# Patient Record
Sex: FEMALE | Race: WHITE | NOT HISPANIC OR LATINO
[De-identification: names, ages, dates, MRNs, and addresses within clinical notes are randomized per-mention and may not be internally consistent; named-entity substitution may affect disease eponyms.]

---

## 2017-03-17 PROBLEM — Z00.00 ENCOUNTER FOR PREVENTIVE HEALTH EXAMINATION: Status: ACTIVE | Noted: 2017-03-17

## 2017-03-30 ENCOUNTER — RESULT CHARGE (OUTPATIENT)
Age: 27
End: 2017-03-30

## 2017-03-30 ENCOUNTER — OUTPATIENT (OUTPATIENT)
Dept: OUTPATIENT SERVICES | Facility: HOSPITAL | Age: 27
LOS: 1 days | Discharge: HOME | End: 2017-03-30

## 2017-03-30 ENCOUNTER — APPOINTMENT (OUTPATIENT)
Dept: OBGYN | Facility: CLINIC | Age: 27
End: 2017-03-30

## 2017-03-30 VITALS — WEIGHT: 168 LBS | SYSTOLIC BLOOD PRESSURE: 110 MMHG | DIASTOLIC BLOOD PRESSURE: 60 MMHG

## 2017-03-30 LAB
HCG UR QL: NEGATIVE
QUALITY CONTROL: NORMAL

## 2017-06-27 DIAGNOSIS — O00.90 UNSPECIFIED ECTOPIC PREGNANCY WITHOUT INTRAUTERINE PREGNANCY: ICD-10-CM

## 2017-07-26 ENCOUNTER — OUTPATIENT (OUTPATIENT)
Dept: OUTPATIENT SERVICES | Facility: HOSPITAL | Age: 27
LOS: 1 days | Discharge: HOME | End: 2017-07-26

## 2017-07-26 DIAGNOSIS — O00.90 UNSPECIFIED ECTOPIC PREGNANCY WITHOUT INTRAUTERINE PREGNANCY: ICD-10-CM

## 2017-08-01 DIAGNOSIS — B82.9 INTESTINAL PARASITISM, UNSPECIFIED: ICD-10-CM

## 2017-08-25 ENCOUNTER — APPOINTMENT (OUTPATIENT)
Dept: ANTEPARTUM | Facility: CLINIC | Age: 27
End: 2017-08-25

## 2017-08-25 ENCOUNTER — OUTPATIENT (OUTPATIENT)
Dept: OUTPATIENT SERVICES | Facility: HOSPITAL | Age: 27
LOS: 1 days | Discharge: HOME | End: 2017-08-25

## 2017-08-25 VITALS — HEART RATE: 82 BPM | OXYGEN SATURATION: 97 % | TEMPERATURE: 97.1 F

## 2017-08-25 VITALS
HEIGHT: 61 IN | WEIGHT: 174.38 LBS | SYSTOLIC BLOOD PRESSURE: 128 MMHG | DIASTOLIC BLOOD PRESSURE: 85 MMHG | BODY MASS INDEX: 32.92 KG/M2

## 2017-08-25 DIAGNOSIS — Z87.09 PERSONAL HISTORY OF OTHER DISEASES OF THE RESPIRATORY SYSTEM: ICD-10-CM

## 2017-08-25 DIAGNOSIS — Z87.891 PERSONAL HISTORY OF NICOTINE DEPENDENCE: ICD-10-CM

## 2017-08-25 DIAGNOSIS — Z82.49 FAMILY HISTORY OF ISCHEMIC HEART DISEASE AND OTHER DISEASES OF THE CIRCULATORY SYSTEM: ICD-10-CM

## 2017-08-25 DIAGNOSIS — O00.10 TUBAL PREGNANCY/ WITHOUT INTRAUTERINE PREGNANCY: ICD-10-CM

## 2017-08-25 DIAGNOSIS — Z80.0 FAMILY HISTORY OF MALIGNANT NEOPLASM OF DIGESTIVE ORGANS: ICD-10-CM

## 2017-08-25 DIAGNOSIS — Z01.419 ENCOUNTER FOR GYNECOLOGICAL EXAMINATION (GENERAL) (ROUTINE) W/OUT ABNORMAL FINDINGS: ICD-10-CM

## 2017-08-25 DIAGNOSIS — O09.899 SUPERVISION OF OTHER HIGH RISK PREGNANCIES, UNSPECIFIED TRIMESTER: ICD-10-CM

## 2017-08-25 DIAGNOSIS — Z86.69 PERSONAL HISTORY OF OTHER DISEASES OF THE NERVOUS SYSTEM AND SENSE ORGANS: ICD-10-CM

## 2017-08-25 DIAGNOSIS — O00.90 UNSPECIFIED ECTOPIC PREGNANCY WITHOUT INTRAUTERINE PREGNANCY: ICD-10-CM

## 2017-09-01 ENCOUNTER — OUTPATIENT (OUTPATIENT)
Dept: OUTPATIENT SERVICES | Facility: HOSPITAL | Age: 27
LOS: 1 days | Discharge: HOME | End: 2017-09-01

## 2017-09-01 ENCOUNTER — APPOINTMENT (OUTPATIENT)
Dept: ANTEPARTUM | Facility: CLINIC | Age: 27
End: 2017-09-01

## 2017-09-01 DIAGNOSIS — O00.90 UNSPECIFIED ECTOPIC PREGNANCY WITHOUT INTRAUTERINE PREGNANCY: ICD-10-CM

## 2017-09-15 ENCOUNTER — APPOINTMENT (OUTPATIENT)
Dept: ANTEPARTUM | Facility: CLINIC | Age: 27
End: 2017-09-15

## 2017-09-15 ENCOUNTER — OUTPATIENT (OUTPATIENT)
Dept: OUTPATIENT SERVICES | Facility: HOSPITAL | Age: 27
LOS: 1 days | Discharge: HOME | End: 2017-09-15

## 2017-09-15 DIAGNOSIS — O00.90 UNSPECIFIED ECTOPIC PREGNANCY WITHOUT INTRAUTERINE PREGNANCY: ICD-10-CM

## 2017-10-13 ENCOUNTER — OUTPATIENT (OUTPATIENT)
Dept: OUTPATIENT SERVICES | Facility: HOSPITAL | Age: 27
LOS: 1 days | Discharge: HOME | End: 2017-10-13

## 2017-10-13 ENCOUNTER — APPOINTMENT (OUTPATIENT)
Dept: ANTEPARTUM | Facility: CLINIC | Age: 27
End: 2017-10-13

## 2017-10-13 DIAGNOSIS — O00.90 UNSPECIFIED ECTOPIC PREGNANCY WITHOUT INTRAUTERINE PREGNANCY: ICD-10-CM

## 2017-11-02 ENCOUNTER — INPATIENT (INPATIENT)
Facility: HOSPITAL | Age: 27
LOS: 0 days | Discharge: LEFT BEFORE DISCHARGE | End: 2017-11-03
Attending: INTERNAL MEDICINE | Admitting: INTERNAL MEDICINE

## 2017-11-02 DIAGNOSIS — O00.90 UNSPECIFIED ECTOPIC PREGNANCY WITHOUT INTRAUTERINE PREGNANCY: ICD-10-CM

## 2017-11-06 DIAGNOSIS — R51 HEADACHE: ICD-10-CM

## 2017-11-06 DIAGNOSIS — O99.012 ANEMIA COMPLICATING PREGNANCY, SECOND TRIMESTER: ICD-10-CM

## 2017-11-06 DIAGNOSIS — G43.901 MIGRAINE, UNSPECIFIED, NOT INTRACTABLE, WITH STATUS MIGRAINOSUS: ICD-10-CM

## 2017-11-06 DIAGNOSIS — O09.12 SUPERVISION OF PREGNANCY WITH HISTORY OF ECTOPIC PREGNANCY, SECOND TRIMESTER: ICD-10-CM

## 2017-11-06 DIAGNOSIS — O99.352 DISEASES OF THE NERVOUS SYSTEM COMPLICATING PREGNANCY, SECOND TRIMESTER: ICD-10-CM

## 2017-11-06 DIAGNOSIS — R82.71 BACTERIURIA: ICD-10-CM

## 2017-11-06 DIAGNOSIS — D50.9 IRON DEFICIENCY ANEMIA, UNSPECIFIED: ICD-10-CM

## 2017-11-06 DIAGNOSIS — Z3A.14 14 WEEKS GESTATION OF PREGNANCY: ICD-10-CM

## 2017-12-08 ENCOUNTER — OUTPATIENT (OUTPATIENT)
Dept: OUTPATIENT SERVICES | Facility: HOSPITAL | Age: 27
LOS: 1 days | Discharge: HOME | End: 2017-12-08

## 2017-12-08 ENCOUNTER — APPOINTMENT (OUTPATIENT)
Dept: ANTEPARTUM | Facility: CLINIC | Age: 27
End: 2017-12-08

## 2017-12-08 DIAGNOSIS — O00.90 UNSPECIFIED ECTOPIC PREGNANCY WITHOUT INTRAUTERINE PREGNANCY: ICD-10-CM

## 2017-12-11 ENCOUNTER — OUTPATIENT (OUTPATIENT)
Dept: OUTPATIENT SERVICES | Facility: HOSPITAL | Age: 27
LOS: 1 days | Discharge: HOME | End: 2017-12-11

## 2017-12-11 ENCOUNTER — APPOINTMENT (OUTPATIENT)
Dept: ANTEPARTUM | Facility: CLINIC | Age: 27
End: 2017-12-11

## 2017-12-11 DIAGNOSIS — Z34.00 ENCOUNTER FOR SUPERVISION OF NORMAL FIRST PREGNANCY, UNSPECIFIED TRIMESTER: ICD-10-CM

## 2017-12-11 DIAGNOSIS — O00.90 UNSPECIFIED ECTOPIC PREGNANCY WITHOUT INTRAUTERINE PREGNANCY: ICD-10-CM

## 2017-12-23 ENCOUNTER — OUTPATIENT (OUTPATIENT)
Dept: OUTPATIENT SERVICES | Facility: HOSPITAL | Age: 27
LOS: 1 days | Discharge: HOME | End: 2017-12-23

## 2017-12-23 DIAGNOSIS — O00.90 UNSPECIFIED ECTOPIC PREGNANCY WITHOUT INTRAUTERINE PREGNANCY: ICD-10-CM

## 2017-12-29 DIAGNOSIS — O26.892 OTHER SPECIFIED PREGNANCY RELATED CONDITIONS, SECOND TRIMESTER: ICD-10-CM

## 2017-12-29 DIAGNOSIS — N89.8 OTHER SPECIFIED NONINFLAMMATORY DISORDERS OF VAGINA: ICD-10-CM

## 2018-01-05 ENCOUNTER — APPOINTMENT (OUTPATIENT)
Dept: ANTEPARTUM | Facility: CLINIC | Age: 28
End: 2018-01-05

## 2018-03-03 ENCOUNTER — OUTPATIENT (OUTPATIENT)
Dept: OUTPATIENT SERVICES | Facility: HOSPITAL | Age: 28
LOS: 1 days | Discharge: HOME | End: 2018-03-03

## 2018-03-03 VITALS
HEART RATE: 90 BPM | RESPIRATION RATE: 18 BRPM | SYSTOLIC BLOOD PRESSURE: 129 MMHG | TEMPERATURE: 98 F | DIASTOLIC BLOOD PRESSURE: 67 MMHG

## 2018-03-03 VITALS
DIASTOLIC BLOOD PRESSURE: 67 MMHG | HEART RATE: 90 BPM | SYSTOLIC BLOOD PRESSURE: 129 MMHG | RESPIRATION RATE: 18 BRPM | TEMPERATURE: 98 F

## 2018-03-03 DIAGNOSIS — O26.893 OTHER SPECIFIED PREGNANCY RELATED CONDITIONS, THIRD TRIMESTER: ICD-10-CM

## 2018-03-03 DIAGNOSIS — O47.03 FALSE LABOR BEFORE 37 COMPLETED WEEKS OF GESTATION, THIRD TRIMESTER: ICD-10-CM

## 2018-03-03 DIAGNOSIS — Z98.890 OTHER SPECIFIED POSTPROCEDURAL STATES: Chronic | ICD-10-CM

## 2018-03-03 NOTE — OB PROVIDER TRIAGE NOTE - NSHPPHYSICALEXAM_GEN_ALL_CORE
Vital Signs Last 24 Hrs  T(C): 36.9 (03 Mar 2018 05:02), Max: 36.9 (03 Mar 2018 05:00)  T(F): 98.4 (03 Mar 2018 05:02), Max: 98.42 (03 Mar 2018 05:00)  HR: 90 (03 Mar 2018 05:02) (90 - 90)  BP: 129/67 (03 Mar 2018 05:02) (129/67 - 129/67)  RR: 18 (03 Mar 2018 05:02) (18 - 18)    abd: soft, gravid, nontender, no palpable ctx, no CVA tenderness  EFM: 140/mod/accels+  toco: no ctx  SVE: c/l/p

## 2018-03-03 NOTE — OB PROVIDER TRIAGE NOTE - NSOBPROVIDERNOTE_OBGYN_ALL_OB_FT
26 y/o  at 31w2d GA, GBS unknown, fetus with tricuspid atresia, IUGR (EFW<3%ile), BPP 8/8, not in  labor.  - discharge home  - f/u with PMD and maternal fetal medicine at [facility]  - growth scan for EFW  - PO hydration encouraged  -  labor precautions given    Dr. Bright and Dr. Castillo aware.

## 2018-03-03 NOTE — OB PROVIDER TRIAGE NOTE - HISTORY OF PRESENT ILLNESS
28 y/o  at 31w2d by first trimester sonogram, presents for lower abdominal cramping since yesterday afternoon. Cramping is intermittent, 4/10 in intensity, radiating to lower back, lasts for few seconds. Denies vaginal bleeding and leakage of fluid. Reports good fetal movements. Denies fevers, n/v, chest pain, SOB, dysuria, diarrhea. Pregnancy complicated by fetal cardiac defect that was first observed on anatomy scan. Patient was followed at the high risk clinic but care was transferred to [facility]. Fetal echocardiogram at [place] showed probable tricuspid atresia per patient.  No other complications during this pregnancy. On last sonogram 1 week ago patient was IUGR (9 days behind per patient)    Ob Hx: G1 - SABx1 with no D&C, G2 - right ectopic pregnancy with lap right salpingectomy.   Gyn Hx: Denies cysts, fibroids, abnormal PAP smears and STDs.

## 2018-12-06 ENCOUNTER — APPOINTMENT (OUTPATIENT)
Dept: PLASTIC SURGERY | Facility: CLINIC | Age: 28
End: 2018-12-06

## 2018-12-11 ENCOUNTER — APPOINTMENT (OUTPATIENT)
Dept: BREAST CENTER | Facility: CLINIC | Age: 28
End: 2018-12-11

## 2019-07-16 ENCOUNTER — APPOINTMENT (OUTPATIENT)
Dept: OBGYN | Facility: CLINIC | Age: 29
End: 2019-07-16

## 2019-07-16 ENCOUNTER — RECORD ABSTRACTING (OUTPATIENT)
Age: 29
End: 2019-07-16

## 2019-07-16 DIAGNOSIS — Z78.9 OTHER SPECIFIED HEALTH STATUS: ICD-10-CM

## 2019-07-16 DIAGNOSIS — Z87.42 PERSONAL HISTORY OF OTHER DISEASES OF THE FEMALE GENITAL TRACT: ICD-10-CM

## 2019-07-16 DIAGNOSIS — Z86.19 PERSONAL HISTORY OF OTHER INFECTIOUS AND PARASITIC DISEASES: ICD-10-CM

## 2019-07-16 DIAGNOSIS — Z80.0 FAMILY HISTORY OF MALIGNANT NEOPLASM OF DIGESTIVE ORGANS: ICD-10-CM

## 2019-07-16 LAB
CYTOLOGY CVX/VAG DOC THIN PREP: NORMAL
CYTOLOGY CVX/VAG DOC THIN PREP: NORMAL

## 2020-02-06 ENCOUNTER — APPOINTMENT (OUTPATIENT)
Dept: NEUROLOGY | Facility: CLINIC | Age: 30
End: 2020-02-06
Payer: MEDICAID

## 2020-02-06 DIAGNOSIS — R51 HEADACHE: ICD-10-CM

## 2020-02-06 PROCEDURE — 99203 OFFICE O/P NEW LOW 30 MIN: CPT

## 2020-02-06 NOTE — PHYSICAL EXAM
[FreeTextEntry1] : Neurological Exam: \par Mental status: Awake, alert and oriented x3.  Recent and remote memory intact.  Naming, repetition and comprehension intact.  Attention/concentration intact.  No dysarthria, no aphasia.  Fund of knowledge appropriate.  \par Cranial nerves: Pupils equally round and reactive to light, visual fields full, no nystagmus, extraocular muscles intact, V1 through V3 intact bilaterally and symmetric, face symmetric, hearing intact to finger rub, palate elevation symmetric, tongue was midline.\par Motor:  MRC grading 5/5 b/l UE/LE.   strength 5/5.  Normal tone and bulk.  No abnormal movements.  \par Sensation: Intact to light touch, proprioception, and pinprick. \par Coordination: No dysmetria on finger-to-nose and heel-to-shin.  No dysdiadokinesia.\par Reflexes: 2+ in bilateral UE/LE, downgoing toes bilaterally. (-) Meza.\par Gait: Narrow and steady. No ataxia.  Romberg negative\par \par

## 2020-02-06 NOTE — HISTORY OF PRESENT ILLNESS
[FreeTextEntry1] : patient is a 30 yo female with hx of PCOS who presents for chonic migraines. patient has had migraines since age 13.  they are getting worse in that they are more intense and more frequent.\par she saw dr garcia 10 yr ago and she has been on imitrex prn since.  it has worked in the past but now she needs it too frequently\par has 15-20 ha days a month. patient has never been on a preventative in the past\par HA are described as bilateral usually, pounding , burning and sharp.  10/10 in intensity. has eye pain as well. +n/v\par +photo/phonophobia \par hot showers help her. \par they have become more and more debilitating

## 2020-02-06 NOTE — ASSESSMENT
[FreeTextEntry1] : patient is a 30 yo female with PCOS and chronic migraines who presents for worsening migraines\par MRI brain nc\par will start topamax 25 mg qHS\par cont imitrex 100 mg PRN\par Mg 400 mg qd and vit B2 100 Q 12\par f/u 4 mos

## 2020-03-02 ENCOUNTER — APPOINTMENT (OUTPATIENT)
Dept: OBGYN | Facility: CLINIC | Age: 30
End: 2020-03-02

## 2020-04-28 DIAGNOSIS — Z86.19 PERSONAL HISTORY OF OTHER INFECTIOUS AND PARASITIC DISEASES: ICD-10-CM

## 2020-07-20 ENCOUNTER — APPOINTMENT (OUTPATIENT)
Dept: OBGYN | Facility: CLINIC | Age: 30
End: 2020-07-20
Payer: MEDICAID

## 2020-09-17 ENCOUNTER — APPOINTMENT (OUTPATIENT)
Dept: OBGYN | Facility: CLINIC | Age: 30
End: 2020-09-17
Payer: MEDICAID

## 2020-09-17 VITALS
DIASTOLIC BLOOD PRESSURE: 86 MMHG | WEIGHT: 197 LBS | TEMPERATURE: 97.8 F | SYSTOLIC BLOOD PRESSURE: 130 MMHG | BODY MASS INDEX: 37.22 KG/M2

## 2020-09-17 DIAGNOSIS — E28.2 POLYCYSTIC OVARIAN SYNDROME: ICD-10-CM

## 2020-09-17 DIAGNOSIS — N92.6 IRREGULAR MENSTRUATION, UNSPECIFIED: ICD-10-CM

## 2020-09-17 DIAGNOSIS — Z01.411 ENCOUNTER FOR GYNECOLOGICAL EXAMINATION (GENERAL) (ROUTINE) WITH ABNORMAL FINDINGS: ICD-10-CM

## 2020-09-17 PROCEDURE — 99395 PREV VISIT EST AGE 18-39: CPT

## 2020-09-17 PROCEDURE — 99213 OFFICE O/P EST LOW 20 MIN: CPT | Mod: 25

## 2020-09-17 PROCEDURE — 81025 URINE PREGNANCY TEST: CPT

## 2020-12-23 PROBLEM — Z86.19 HISTORY OF CANDIDIASIS OF VAGINA: Status: RESOLVED | Noted: 2020-04-28 | Resolved: 2020-12-23

## 2021-01-28 ENCOUNTER — APPOINTMENT (OUTPATIENT)
Dept: OBGYN | Facility: CLINIC | Age: 31
End: 2021-01-28
Payer: MEDICAID

## 2021-01-28 DIAGNOSIS — R31.29 OTHER MICROSCOPIC HEMATURIA: ICD-10-CM

## 2021-01-28 DIAGNOSIS — R82.998 OTHER ABNORMAL FINDINGS IN URINE: ICD-10-CM

## 2021-01-28 DIAGNOSIS — Z11.3 ENCOUNTER FOR SCREENING FOR INFECTIONS WITH A PREDOMINANTLY SEXUAL MODE OF TRANSMISSION: ICD-10-CM

## 2021-01-28 DIAGNOSIS — N39.0 URINARY TRACT INFECTION, SITE NOT SPECIFIED: ICD-10-CM

## 2021-01-28 DIAGNOSIS — B37.3 CANDIDIASIS OF VULVA AND VAGINA: ICD-10-CM

## 2021-01-28 PROCEDURE — 99072 ADDL SUPL MATRL&STAF TM PHE: CPT

## 2021-01-28 PROCEDURE — 99214 OFFICE O/P EST MOD 30 MIN: CPT

## 2021-01-28 NOTE — PHYSICAL EXAM
[Alert] : alert [Appropriately responsive] : appropriately responsive [No Acute Distress] : no acute distress [No Lymphadenopathy] : no lymphadenopathy [Regular Rate Rhythm] : regular rate rhythm [No Murmurs] : no murmurs [Clear to Auscultation B/L] : clear to auscultation bilaterally [Soft] : soft [Non-tender] : non-tender [Non-distended] : non-distended [No HSM] : No HSM [No Lesions] : no lesions [No Mass] : no mass [Oriented x3] : oriented x3 [Labia Majora] : normal [Labia Minora] : normal [Tenderness] : tenderness [Discharge] : a  ~M vaginal discharge was present [Normal] : normal [Uterine Adnexae] : normal

## 2021-01-28 NOTE — REASON FOR VISIT
[Annual] : an annual visit. [Urinary Complaints] : urinary complaints [Vulvar/Vaginal Complaint] : vulvar/vaginal complaint

## 2021-01-28 NOTE — HISTORY OF PRESENT ILLNESS
[FreeTextEntry1] : Patient is 30 years old para 0-1-2-1 last menstrual period August 15, 2020\par She has a history of irregular menses and polycystic ovary syndrome\par Patient is scheduled to have a an appointment with a medical endocrinologist

## 2021-01-28 NOTE — DISCUSSION/SUMMARY
[FreeTextEntry1] : B VV, gonorrhea and Chlamydia test done\par Prescribed terconazole 7 and Lotrisone cream\par Prescribe Cipro 500 mg twice daily x7 days\par Labs for other STDs

## 2021-01-28 NOTE — REASON FOR VISIT
[Follow-Up] : a follow-up evaluation of [Urinary Complaints] : urinary complaints [Vulvar/Vaginal Complaint] : vulvar/vaginal complaint

## 2021-01-28 NOTE — HISTORY OF PRESENT ILLNESS
[FreeTextEntry1] : Patient is 30 years old para 0-1-2-1 last menstrual period January 3, 2021\par She complains of urinary symptoms including frequency and feeling of incomplete emptying of the bladder\par Urine dip notes moderate leukocytes and microscopic hematuria\par She also complains of vaginal discharge with vulvar irritation and inflammation\par She requests STD testing

## 2021-02-02 DIAGNOSIS — B96.89 ACUTE VAGINITIS: ICD-10-CM

## 2021-02-02 DIAGNOSIS — N76.0 ACUTE VAGINITIS: ICD-10-CM

## 2021-04-15 ENCOUNTER — APPOINTMENT (OUTPATIENT)
Dept: PLASTIC SURGERY | Facility: CLINIC | Age: 31
End: 2021-04-15
Payer: MEDICAID

## 2021-04-15 VITALS — BODY MASS INDEX: 34.93 KG/M2 | WEIGHT: 185 LBS | HEIGHT: 61 IN

## 2021-04-15 PROCEDURE — 99203 OFFICE O/P NEW LOW 30 MIN: CPT

## 2021-04-15 PROCEDURE — 99072 ADDL SUPL MATRL&STAF TM PHE: CPT

## 2021-04-15 NOTE — PHYSICAL EXAM
[NI] : Normal [de-identified] : macromastia   grade 3 ptosis  large pale areola   slightly diminished nipple sensation B/L  no masses  left side appears larger

## 2021-04-15 NOTE — ASSESSMENT
[FreeTextEntry1] : 30 yr old woman here for evaluationm for breast reduction\par \par going through separation\par has three yr old daughter\par \par nonsmoker\par nondiabetic\par no personal h/o breast dz\par no family h/o breast dz\par \par 5'1"  185 lbs\par \par last bra approx G cup size\par desires full D bra\par \par sees therapist twice a week\par \par Patient is a good candidate for breast reduction.  Regarding the procedure, we discussed scarring, poor wound healing, keloid and/or hypertrophic scarring, diminished nipple sensation, diminished ability to breast feed (if appropriate), breast asymmetry, dissatisfaction with the outcome, need for additional surgery and possible nipple loss.  All questions were answered and all risks were understood.\par \par Due to COVID 19, pre-visit patient instructions were explained to the patient and their symptoms were checked upon arrival.  \par Masks were used by the health care providers and staff and the examination room was cleaned after the patient visit was completed.\par \par Photos taken with patient permission.\par

## 2021-05-11 ENCOUNTER — NON-APPOINTMENT (OUTPATIENT)
Age: 31
End: 2021-05-11

## 2021-05-13 ENCOUNTER — APPOINTMENT (OUTPATIENT)
Dept: PLASTIC SURGERY | Facility: CLINIC | Age: 31
End: 2021-05-13
Payer: MEDICAID

## 2021-05-13 PROCEDURE — 99212 OFFICE O/P EST SF 10 MIN: CPT

## 2021-05-13 PROCEDURE — 99072 ADDL SUPL MATRL&STAF TM PHE: CPT

## 2021-05-13 NOTE — PHYSICAL EXAM
[NI] : Normal [de-identified] : macromastia   grade 3 ptosis  large pale areola   slightly diminished nipple sensation B/L  no masses  left side appears larger

## 2021-05-13 NOTE — ASSESSMENT
[FreeTextEntry1] : 30 yr old woman here for evaluationm for breast reduction\par \par going through separation\par has three yr old daughter\par \par nonsmoker\par nondiabetic\par no personal h/o breast dz\par no family h/o breast dz\par \par 5'1"  185 lbs\par \par last bra approx G cup size\par desires full D bra\par \par sees therapist twice a week\par \par Patient is a good candidate for breast reduction.  Regarding the procedure, we discussed scarring, poor wound healing, keloid and/or hypertrophic scarring, diminished nipple sensation, diminished ability to breast feed (if appropriate), breast asymmetry, dissatisfaction with the outcome, need for additional surgery and possible nipple loss.  All questions were answered and all risks were understood.\par \par Due to COVID 19, pre-visit patient instructions were explained to the patient and their symptoms were checked upon arrival.  \par Masks were used by the health care providers and staff and the examination room was cleaned after the patient visit was completed.\par \par Photos taken with patient permission.\par \par 5/13/2021\par here in f/u to discuss breast reduction\par \par pt states on much better terms w the father of her child (not back together but getting along much better)\par \par Still needs clearance from her psychologist--she has not been able to get a hold of her\par \par explained in detail importance of getting this clearance\par \par return when she has been able to get this clearance\par \par pt lives w three yr old daughter\par after surgery pt's mother will be able to help her as well as the daughter's father\par \par pt states she understands the approach

## 2021-10-10 LAB — HCG UR QL: NEGATIVE

## 2021-10-12 ENCOUNTER — EMERGENCY (EMERGENCY)
Facility: HOSPITAL | Age: 31
LOS: 0 days | Discharge: HOME | End: 2021-10-13
Attending: EMERGENCY MEDICINE | Admitting: EMERGENCY MEDICINE
Payer: MEDICAID

## 2021-10-12 VITALS
RESPIRATION RATE: 18 BRPM | DIASTOLIC BLOOD PRESSURE: 70 MMHG | HEART RATE: 82 BPM | SYSTOLIC BLOOD PRESSURE: 128 MMHG | OXYGEN SATURATION: 99 % | WEIGHT: 190.04 LBS | TEMPERATURE: 97 F

## 2021-10-12 DIAGNOSIS — Z98.890 OTHER SPECIFIED POSTPROCEDURAL STATES: Chronic | ICD-10-CM

## 2021-10-12 DIAGNOSIS — R11.0 NAUSEA: ICD-10-CM

## 2021-10-12 DIAGNOSIS — R10.11 RIGHT UPPER QUADRANT PAIN: ICD-10-CM

## 2021-10-12 LAB
ALBUMIN SERPL ELPH-MCNC: 4 G/DL — SIGNIFICANT CHANGE UP (ref 3.5–5.2)
ALP SERPL-CCNC: 122 U/L — HIGH (ref 30–115)
ALT FLD-CCNC: 24 U/L — SIGNIFICANT CHANGE UP (ref 0–41)
ANION GAP SERPL CALC-SCNC: 11 MMOL/L — SIGNIFICANT CHANGE UP (ref 7–14)
AST SERPL-CCNC: 18 U/L — SIGNIFICANT CHANGE UP (ref 0–41)
BASOPHILS # BLD AUTO: 0.07 K/UL — SIGNIFICANT CHANGE UP (ref 0–0.2)
BASOPHILS NFR BLD AUTO: 0.5 % — SIGNIFICANT CHANGE UP (ref 0–1)
BILIRUB SERPL-MCNC: <0.2 MG/DL — SIGNIFICANT CHANGE UP (ref 0.2–1.2)
BUN SERPL-MCNC: 14 MG/DL — SIGNIFICANT CHANGE UP (ref 10–20)
CALCIUM SERPL-MCNC: 9.7 MG/DL — SIGNIFICANT CHANGE UP (ref 8.5–10.1)
CHLORIDE SERPL-SCNC: 102 MMOL/L — SIGNIFICANT CHANGE UP (ref 98–110)
CO2 SERPL-SCNC: 24 MMOL/L — SIGNIFICANT CHANGE UP (ref 17–32)
CREAT SERPL-MCNC: 0.7 MG/DL — SIGNIFICANT CHANGE UP (ref 0.7–1.5)
EOSINOPHIL # BLD AUTO: 0.41 K/UL — SIGNIFICANT CHANGE UP (ref 0–0.7)
EOSINOPHIL NFR BLD AUTO: 3.2 % — SIGNIFICANT CHANGE UP (ref 0–8)
GLUCOSE SERPL-MCNC: 106 MG/DL — HIGH (ref 70–99)
HCT VFR BLD CALC: 38.6 % — SIGNIFICANT CHANGE UP (ref 37–47)
HGB BLD-MCNC: 12.7 G/DL — SIGNIFICANT CHANGE UP (ref 12–16)
IMM GRANULOCYTES NFR BLD AUTO: 0.3 % — SIGNIFICANT CHANGE UP (ref 0.1–0.3)
LIDOCAIN IGE QN: 27 U/L — SIGNIFICANT CHANGE UP (ref 7–60)
LYMPHOCYTES # BLD AUTO: 2.92 K/UL — SIGNIFICANT CHANGE UP (ref 1.2–3.4)
LYMPHOCYTES # BLD AUTO: 22.7 % — SIGNIFICANT CHANGE UP (ref 20.5–51.1)
MCHC RBC-ENTMCNC: 26.3 PG — LOW (ref 27–31)
MCHC RBC-ENTMCNC: 32.9 G/DL — SIGNIFICANT CHANGE UP (ref 32–37)
MCV RBC AUTO: 79.9 FL — LOW (ref 81–99)
MONOCYTES # BLD AUTO: 0.72 K/UL — HIGH (ref 0.1–0.6)
MONOCYTES NFR BLD AUTO: 5.6 % — SIGNIFICANT CHANGE UP (ref 1.7–9.3)
NEUTROPHILS # BLD AUTO: 8.71 K/UL — HIGH (ref 1.4–6.5)
NEUTROPHILS NFR BLD AUTO: 67.7 % — SIGNIFICANT CHANGE UP (ref 42.2–75.2)
NRBC # BLD: 0 /100 WBCS — SIGNIFICANT CHANGE UP (ref 0–0)
PLATELET # BLD AUTO: 456 K/UL — HIGH (ref 130–400)
POTASSIUM SERPL-MCNC: 4 MMOL/L — SIGNIFICANT CHANGE UP (ref 3.5–5)
POTASSIUM SERPL-SCNC: 4 MMOL/L — SIGNIFICANT CHANGE UP (ref 3.5–5)
PROT SERPL-MCNC: 6.8 G/DL — SIGNIFICANT CHANGE UP (ref 6–8)
RBC # BLD: 4.83 M/UL — SIGNIFICANT CHANGE UP (ref 4.2–5.4)
RBC # FLD: 13.3 % — SIGNIFICANT CHANGE UP (ref 11.5–14.5)
SODIUM SERPL-SCNC: 137 MMOL/L — SIGNIFICANT CHANGE UP (ref 135–146)
WBC # BLD: 12.87 K/UL — HIGH (ref 4.8–10.8)
WBC # FLD AUTO: 12.87 K/UL — HIGH (ref 4.8–10.8)

## 2021-10-12 PROCEDURE — 99285 EMERGENCY DEPT VISIT HI MDM: CPT

## 2021-10-12 RX ORDER — ONDANSETRON 8 MG/1
4 TABLET, FILM COATED ORAL ONCE
Refills: 0 | Status: COMPLETED | OUTPATIENT
Start: 2021-10-12 | End: 2021-10-13

## 2021-10-12 RX ORDER — MORPHINE SULFATE 50 MG/1
4 CAPSULE, EXTENDED RELEASE ORAL ONCE
Refills: 0 | Status: DISCONTINUED | OUTPATIENT
Start: 2021-10-12 | End: 2021-10-12

## 2021-10-12 RX ORDER — SODIUM CHLORIDE 9 MG/ML
1000 INJECTION INTRAMUSCULAR; INTRAVENOUS; SUBCUTANEOUS ONCE
Refills: 0 | Status: COMPLETED | OUTPATIENT
Start: 2021-10-12 | End: 2021-10-12

## 2021-10-13 VITALS
HEART RATE: 79 BPM | DIASTOLIC BLOOD PRESSURE: 66 MMHG | OXYGEN SATURATION: 97 % | RESPIRATION RATE: 18 BRPM | TEMPERATURE: 97 F | SYSTOLIC BLOOD PRESSURE: 113 MMHG

## 2021-10-13 LAB
APPEARANCE UR: CLEAR — SIGNIFICANT CHANGE UP
BACTERIA # UR AUTO: ABNORMAL
BILIRUB UR-MCNC: NEGATIVE — SIGNIFICANT CHANGE UP
COLOR SPEC: YELLOW — SIGNIFICANT CHANGE UP
DIFF PNL FLD: ABNORMAL
EPI CELLS # UR: ABNORMAL /HPF
GLUCOSE UR QL: NEGATIVE MG/DL — SIGNIFICANT CHANGE UP
KETONES UR-MCNC: NEGATIVE — SIGNIFICANT CHANGE UP
LEUKOCYTE ESTERASE UR-ACNC: ABNORMAL
NITRITE UR-MCNC: NEGATIVE — SIGNIFICANT CHANGE UP
PH UR: 6.5 — SIGNIFICANT CHANGE UP (ref 5–8)
PROT UR-MCNC: 30 MG/DL
RBC CASTS # UR COMP ASSIST: ABNORMAL /HPF
SP GR SPEC: 1.02 — SIGNIFICANT CHANGE UP (ref 1.01–1.03)
UROBILINOGEN FLD QL: 0.2 MG/DL — SIGNIFICANT CHANGE UP
WBC UR QL: SIGNIFICANT CHANGE UP /HPF

## 2021-10-13 PROCEDURE — G1004: CPT

## 2021-10-13 PROCEDURE — 76705 ECHO EXAM OF ABDOMEN: CPT | Mod: 26

## 2021-10-13 PROCEDURE — 74177 CT ABD & PELVIS W/CONTRAST: CPT | Mod: 26,ME

## 2021-10-13 RX ORDER — KETOROLAC TROMETHAMINE 30 MG/ML
30 SYRINGE (ML) INJECTION ONCE
Refills: 0 | Status: DISCONTINUED | OUTPATIENT
Start: 2021-10-13 | End: 2021-10-13

## 2021-10-13 RX ORDER — OXYCODONE AND ACETAMINOPHEN 5; 325 MG/1; MG/1
1 TABLET ORAL
Qty: 5 | Refills: 0
Start: 2021-10-13

## 2021-10-13 RX ADMIN — SODIUM CHLORIDE 1000 MILLILITER(S): 9 INJECTION INTRAMUSCULAR; INTRAVENOUS; SUBCUTANEOUS at 00:25

## 2021-10-13 RX ADMIN — Medication 30 MILLIGRAM(S): at 01:38

## 2021-10-13 RX ADMIN — ONDANSETRON 4 MILLIGRAM(S): 8 TABLET, FILM COATED ORAL at 00:25

## 2021-10-13 RX ADMIN — MORPHINE SULFATE 4 MILLIGRAM(S): 50 CAPSULE, EXTENDED RELEASE ORAL at 00:25

## 2021-10-13 NOTE — ED PROVIDER NOTE - PROGRESS NOTE DETAILS
patient re-evaluated and improved we discussed the case with surgery service of dr. Cerda advised to discharge with outpatient follow up we discussed the indications to return patient agrees with the poc

## 2021-10-13 NOTE — ED PROVIDER NOTE - CLINICAL SUMMARY MEDICAL DECISION MAKING FREE TEXT BOX
Patient presents for evaluation of ruq abdominal pain that is worse after eating.  She denies any fevers or chills she is able to tolerate po food and fluid   on exam she had ttp of the ruq abdominal pain with no guarding no rebound patients history is consistent with gb pathology we obtained ruq which reveals thickened wall with edema noted.  ct negative lipase negative.  I have discussed the case with surgical service advised to discharge patient with outpatient management for further evaluation

## 2021-10-13 NOTE — ED PROVIDER NOTE - OBJECTIVE STATEMENT
Patient is a 30 yo f who presents for evaluation of abdominal pain located in the right upper quad onset intermittent over the past 1 week she denies any fevers or chills she denies any vomiting but notes nausea she denies any diarrhea she prior surgical history of ectopic pregnancy in the past but no other she denies any dysuria hesitancy or frequency

## 2021-10-13 NOTE — ED PROVIDER NOTE - CARE PROVIDER_API CALL
Colton Cerda)  Surgery  [address] [place] [address]  Phone: [phone]  Fax: [phone]  Follow Up Time:

## 2021-10-13 NOTE — ED PROVIDER NOTE - NSFOLLOWUPINSTRUCTIONS_ED_ALL_ED_FT
SEE YOUR DOCTOR IN THE NEXT 24-48 HOURS   RETURN FOR ANY FURTHER CONCERNS     Gallstones    Gallstones (cholelithiasis) is a form of gallbladder disease in which stones form in your gallbladder. The gallbladder is an organ that stores bile made in the liver, which helps digest fats. Gallstones begin as small bile crystals and slowly grow into stones. Gallstone pain occurs when the gallbladder spasms and a gallstone or sludge is blocking the duct. Pain can also occur when a stone passes out of the duct. Only take over-the-counter or prescription medicines for pain, discomfort, or fever as directed by your health care provider. Follow a low-fat diet until seen again by your health care provider.     SEEK IMMEDIATE MEDICAL CARE IF YOU HAVE ANY OF THE FOLLOWING SYMPTOMS: worsening pain, fever, persistent vomiting, yellowing of the skin or eyes, or altered mental status.     Please return to the emergency department immediately should you feel worse in any way or have any of the following symptoms:    •	especially increased or different pain  •	 fevers  •	persistent vomiting  •	shaking chills    Please return to the emergency department for a recheck in 12 hours so we can re-evaluate you and ensure that you are not developing a problem that would require surgery or hospitalization.      Please follow up with the Doctor listed within the time frame specified. Thank you for coming to the emergency department. We hope you are feeling improved and continue to get better. Have a nice day.

## 2021-10-13 NOTE — ED PROVIDER NOTE - PATIENT PORTAL LINK FT
You can access the FollowMyHealth Patient Portal offered by [facility] by registering at the following website: [email or web address]. By joining [employer]’s FollowMyHealth portal, you will also be able to view your health information using other applications (apps) compatible with our system.

## 2021-10-15 ENCOUNTER — APPOINTMENT (OUTPATIENT)
Dept: SURGERY | Facility: CLINIC | Age: 31
End: 2021-10-15
Payer: MEDICAID

## 2021-10-15 VITALS
BODY MASS INDEX: 34.93 KG/M2 | WEIGHT: 185 LBS | OXYGEN SATURATION: 98 % | TEMPERATURE: 97.1 F | HEIGHT: 61 IN | HEART RATE: 88 BPM

## 2021-10-15 VITALS — DIASTOLIC BLOOD PRESSURE: 78 MMHG | SYSTOLIC BLOOD PRESSURE: 110 MMHG

## 2021-10-15 PROCEDURE — 99203 OFFICE O/P NEW LOW 30 MIN: CPT

## 2021-10-15 RX ORDER — CIPROFLOXACIN HYDROCHLORIDE 500 MG/1
500 TABLET, FILM COATED ORAL
Qty: 14 | Refills: 0 | Status: COMPLETED | COMMUNITY
Start: 2021-01-28 | End: 2021-10-15

## 2021-10-15 RX ORDER — METRONIDAZOLE 500 MG/1
500 TABLET ORAL TWICE DAILY
Qty: 14 | Refills: 0 | Status: COMPLETED | COMMUNITY
Start: 2021-02-02 | End: 2021-10-15

## 2021-10-15 RX ORDER — CLOTRIMAZOLE AND BETAMETHASONE DIPROPIONATE 10; .5 MG/G; MG/G
1-0.05 CREAM TOPICAL TWICE DAILY
Qty: 1 | Refills: 0 | Status: COMPLETED | COMMUNITY
Start: 2021-01-28 | End: 2021-10-15

## 2021-10-18 NOTE — ASSESSMENT
[FreeTextEntry1] : This is a 32yo woman with a history of chronic biliary colic, possible chronic cholecystitis as based on ultrasound and chronicity of symptoms. Currently not acutely ill.

## 2021-10-18 NOTE — REVIEW OF SYSTEMS
[Abdominal Pain] : abdominal pain [Heartburn] : heartburn [As Noted in HPI] : as noted in HPI [Heart Rate Is Slow] : the heart rate was not slow [Heart Rate Is Fast] : the heart rate was not fast [Chest Pain] : no chest pain [Shortness Of Breath] : no shortness of breath [Wheezing] : no wheezing [Cough] : no cough [Vomiting] : no vomiting [Constipation] : no constipation [Diarrhea] : no diarrhea [Joint Swelling] : no joint swelling [Joint Stiffness] : no joint stiffness [Limb Pain] : no limb pain [Limb Swelling] : no limb swelling [Confused] : no confusion [Convulsions] : no convulsions [Dizziness] : no dizziness [Fainting] : no fainting [Limb Weakness] : no limb weakness [Suicidal] : not suicidal [Sleep Disturbances] : no sleep disturbances [Anxiety] : no anxiety [Depression] : no depression [Proptosis] : no proptosis [Easy Bleeding] : no tendency for easy bleeding [Easy Bruising] : no tendency for easy bruising [Negative] : ENT [FreeTextEntry7] : Nausea

## 2021-10-18 NOTE — REASON FOR VISIT
[Initial Evaluation] : an initial evaluation [FreeTextEntry1] : Pt here for abdominal pain here for consult from Er visit 10/13/21

## 2021-10-18 NOTE — HISTORY OF PRESENT ILLNESS
[de-identified] : This is a 30yo woman with a history of ectopic pregnancy and migraines who presents for evaluation of abdominal pain. The pt describes a longstanding history of "indigestioin" and "foods giving me trouble." She first noted this a few years ago; pain is almost always post-prandial, located in the epigastrium with radiation to the back. There are no alleviating or exacerbating factors. She denies nausea, emesis, fevers, or chills. The symptoms did worsen during pregnancy; she has a daughter who is a toddler and requires open heart surgery on 11/4. The pt denies recent jaundice, icterus, acholic stools. [de-identified] : The pt did present to the ED for evaluation in the setting of a painful episode on Tuesday of this week. At that time, labs showed no abnormal findings inclusive of CBC and LFTs. US showed cholelithasis with some GB wall thickening and edema. The pt improved while in ED and was discharged with followup today. At the time of er ED visit, the surgical team was not consulted. Since this episode, the pt has had no abdominal pain or complaints. Her complaints recur when she eats large meals, however.

## 2021-10-18 NOTE — PLAN
[FreeTextEntry1] : - I recommend laparoscopic cholecystectomy. We reviewed the risks of the procedure including but not limited to bleeding, infection, retained stones, bile duct injury / bile leak, hernia, need for further procedures and surgery. The pt wishes to proceed with surgery, but will need to advise on timing given her daughter will be having surgery on 11/4. In the interim, she will adhere to low fat diet and return to the ED should an acute exacerbation / flare of abdominal pain occur.

## 2021-10-18 NOTE — PHYSICAL EXAM
[JVD] : no jugular venous distention  [Normal Thyroid] : the thyroid was normal [Normal Breath Sounds] : Normal breath sounds [Normal Heart Sounds] : normal heart sounds [Abdominal Masses] : No abdominal masses [Abdomen Tenderness] : ~T ~M No abdominal tenderness [Tender] : nontender [Enlarged] : not enlarged [No Rash or Lesion] : No rash or lesion [Alert] : alert [Oriented to Person] : oriented to person [Oriented to Place] : oriented to place [Oriented to Time] : oriented to time [Calm] : calm [de-identified] : Well appearing woman in NAD; seated [de-identified] : NCAT [de-identified] : Trachea midline [de-identified] : JHONNYR [de-identified] : NA [de-identified] : soft; obese. Nontender to palpation. Neg Joy's sign. [de-identified] : NA [de-identified] : NA [de-identified] : FROM; extremities warm and well perfsed with intact pedal pulses. [de-identified] : Warm; dry [de-identified] : Normal mood and affect

## 2021-10-21 ENCOUNTER — NON-APPOINTMENT (OUTPATIENT)
Age: 31
End: 2021-10-21

## 2021-10-21 ENCOUNTER — APPOINTMENT (OUTPATIENT)
Dept: SURGERY | Facility: HOSPITAL | Age: 31
End: 2021-10-21

## 2021-10-29 ENCOUNTER — APPOINTMENT (OUTPATIENT)
Dept: SURGERY | Facility: CLINIC | Age: 31
End: 2021-10-29

## 2022-01-24 ENCOUNTER — APPOINTMENT (OUTPATIENT)
Dept: OBGYN | Facility: CLINIC | Age: 32
End: 2022-01-24

## 2022-03-22 ENCOUNTER — APPOINTMENT (OUTPATIENT)
Dept: OBGYN | Facility: CLINIC | Age: 32
End: 2022-03-22
Payer: MEDICAID

## 2022-03-22 VITALS
BODY MASS INDEX: 39.27 KG/M2 | WEIGHT: 208 LBS | SYSTOLIC BLOOD PRESSURE: 116 MMHG | HEIGHT: 61 IN | DIASTOLIC BLOOD PRESSURE: 72 MMHG

## 2022-03-22 PROCEDURE — 99395 PREV VISIT EST AGE 18-39: CPT

## 2022-03-22 NOTE — HISTORY OF PRESENT ILLNESS
[FreeTextEntry1] : Patient is 31 years old para 1-0-0-1 last menstrual period March 14, 2022.\par She notes regular menstrual periods every month.  Patient states that she has intermittent left lower quadrant pain.  Patient states that she is having difficulty losing weight.

## 2022-03-22 NOTE — DISCUSSION/SUMMARY
[FreeTextEntry1] : Pap done\par Self breast exam stressed\par Prescribed hormone profile\par Prescribed pelvic ultrasound\par Keep menstrual calendar\par Follow-up yearly or as needed

## 2022-03-25 DIAGNOSIS — B37.3 CANDIDIASIS OF VULVA AND VAGINA: ICD-10-CM

## 2022-04-23 ENCOUNTER — INPATIENT (INPATIENT)
Facility: HOSPITAL | Age: 32
LOS: 6 days | Discharge: HOME | End: 2022-04-30
Attending: PSYCHIATRY & NEUROLOGY | Admitting: PSYCHIATRY & NEUROLOGY
Payer: MEDICAID

## 2022-04-23 VITALS
HEART RATE: 90 BPM | OXYGEN SATURATION: 98 % | HEIGHT: 61 IN | DIASTOLIC BLOOD PRESSURE: 87 MMHG | SYSTOLIC BLOOD PRESSURE: 135 MMHG | WEIGHT: 212.75 LBS | RESPIRATION RATE: 23 BRPM

## 2022-04-23 DIAGNOSIS — Z83.2 FAMILY HISTORY OF DISEASES OF THE BLOOD AND BLOOD-FORMING ORGANS AND CERTAIN DISORDERS INVOLVING THE IMMUNE MECHANISM: ICD-10-CM

## 2022-04-23 DIAGNOSIS — Z82.69 FAMILY HISTORY OF OTHER DISEASES OF THE MUSCULOSKELETAL SYSTEM AND CONNECTIVE TISSUE: ICD-10-CM

## 2022-04-23 DIAGNOSIS — Z28.310 UNVACCINATED FOR COVID-19: ICD-10-CM

## 2022-04-23 DIAGNOSIS — R40.2413 GLASGOW COMA SCALE SCORE 13-15, AT HOSPITAL ADMISSION: ICD-10-CM

## 2022-04-23 DIAGNOSIS — G43.909 MIGRAINE, UNSPECIFIED, NOT INTRACTABLE, WITHOUT STATUS MIGRAINOSUS: ICD-10-CM

## 2022-04-23 DIAGNOSIS — G93.6 CEREBRAL EDEMA: ICD-10-CM

## 2022-04-23 DIAGNOSIS — E66.9 OBESITY, UNSPECIFIED: ICD-10-CM

## 2022-04-23 DIAGNOSIS — I61.8 OTHER NONTRAUMATIC INTRACEREBRAL HEMORRHAGE: ICD-10-CM

## 2022-04-23 DIAGNOSIS — Z82.49 FAMILY HISTORY OF ISCHEMIC HEART DISEASE AND OTHER DISEASES OF THE CIRCULATORY SYSTEM: ICD-10-CM

## 2022-04-23 DIAGNOSIS — E28.2 POLYCYSTIC OVARIAN SYNDROME: ICD-10-CM

## 2022-04-23 DIAGNOSIS — Z98.890 OTHER SPECIFIED POSTPROCEDURAL STATES: Chronic | ICD-10-CM

## 2022-04-24 LAB
ANION GAP SERPL CALC-SCNC: 11 MMOL/L — SIGNIFICANT CHANGE UP (ref 7–14)
APTT BLD: 30.4 SEC — SIGNIFICANT CHANGE UP (ref 27–39.2)
BLD GP AB SCN SERPL QL: SIGNIFICANT CHANGE UP
BUN SERPL-MCNC: 14 MG/DL — SIGNIFICANT CHANGE UP (ref 10–20)
CALCIUM SERPL-MCNC: 9.4 MG/DL — SIGNIFICANT CHANGE UP (ref 8.5–10.1)
CHLORIDE SERPL-SCNC: 101 MMOL/L — SIGNIFICANT CHANGE UP (ref 98–110)
CO2 SERPL-SCNC: 23 MMOL/L — SIGNIFICANT CHANGE UP (ref 17–32)
CREAT SERPL-MCNC: 0.7 MG/DL — SIGNIFICANT CHANGE UP (ref 0.7–1.5)
EGFR: 117 ML/MIN/1.73M2 — SIGNIFICANT CHANGE UP
ERYTHROCYTE [SEDIMENTATION RATE] IN BLOOD: 50 MM/HR — HIGH (ref 0–20)
GLUCOSE SERPL-MCNC: 97 MG/DL — SIGNIFICANT CHANGE UP (ref 70–99)
HCG SERPL-ACNC: <0.6 MIU/ML — SIGNIFICANT CHANGE UP
HCT VFR BLD CALC: 38.3 % — SIGNIFICANT CHANGE UP (ref 37–47)
HGB BLD-MCNC: 12.8 G/DL — SIGNIFICANT CHANGE UP (ref 12–16)
INR BLD: 1.11 RATIO — SIGNIFICANT CHANGE UP (ref 0.65–1.3)
MCHC RBC-ENTMCNC: 26.1 PG — LOW (ref 27–31)
MCHC RBC-ENTMCNC: 33.4 G/DL — SIGNIFICANT CHANGE UP (ref 32–37)
MCV RBC AUTO: 78.2 FL — LOW (ref 81–99)
NRBC # BLD: 0 /100 WBCS — SIGNIFICANT CHANGE UP (ref 0–0)
PHOSPHATE SERPL-MCNC: 4.4 MG/DL — SIGNIFICANT CHANGE UP (ref 2.1–4.9)
PLATELET # BLD AUTO: 432 K/UL — HIGH (ref 130–400)
POTASSIUM SERPL-MCNC: 3.7 MMOL/L — SIGNIFICANT CHANGE UP (ref 3.5–5)
POTASSIUM SERPL-SCNC: 3.7 MMOL/L — SIGNIFICANT CHANGE UP (ref 3.5–5)
PROTHROM AB SERPL-ACNC: 12.8 SEC — SIGNIFICANT CHANGE UP (ref 9.95–12.87)
RBC # BLD: 4.9 M/UL — SIGNIFICANT CHANGE UP (ref 4.2–5.4)
RBC # FLD: 14 % — SIGNIFICANT CHANGE UP (ref 11.5–14.5)
SODIUM SERPL-SCNC: 135 MMOL/L — SIGNIFICANT CHANGE UP (ref 135–146)
WBC # BLD: 12.46 K/UL — HIGH (ref 4.8–10.8)
WBC # FLD AUTO: 12.46 K/UL — HIGH (ref 4.8–10.8)

## 2022-04-24 PROCEDURE — 70450 CT HEAD/BRAIN W/O DYE: CPT | Mod: 26

## 2022-04-24 PROCEDURE — 99291 CRITICAL CARE FIRST HOUR: CPT | Mod: GC

## 2022-04-24 RX ORDER — SODIUM CHLORIDE 9 MG/ML
1000 INJECTION, SOLUTION INTRAVENOUS
Refills: 0 | Status: DISCONTINUED | OUTPATIENT
Start: 2022-04-24 | End: 2022-04-25

## 2022-04-24 RX ORDER — ONDANSETRON 8 MG/1
4 TABLET, FILM COATED ORAL EVERY 6 HOURS
Refills: 0 | Status: DISCONTINUED | OUTPATIENT
Start: 2022-04-24 | End: 2022-04-25

## 2022-04-24 RX ORDER — POTASSIUM CHLORIDE 20 MEQ
40 PACKET (EA) ORAL ONCE
Refills: 0 | Status: COMPLETED | OUTPATIENT
Start: 2022-04-24 | End: 2022-04-24

## 2022-04-24 RX ORDER — HEPARIN SODIUM 5000 [USP'U]/ML
5000 INJECTION INTRAVENOUS; SUBCUTANEOUS EVERY 8 HOURS
Refills: 0 | Status: DISCONTINUED | OUTPATIENT
Start: 2022-04-25 | End: 2022-04-30

## 2022-04-24 RX ORDER — ACETAMINOPHEN 500 MG
1000 TABLET ORAL EVERY 6 HOURS
Refills: 0 | Status: COMPLETED | OUTPATIENT
Start: 2022-04-24 | End: 2022-04-24

## 2022-04-24 RX ORDER — OXYCODONE HYDROCHLORIDE 5 MG/1
2.5 TABLET ORAL EVERY 4 HOURS
Refills: 0 | Status: DISCONTINUED | OUTPATIENT
Start: 2022-04-24 | End: 2022-04-24

## 2022-04-24 RX ORDER — ACETAMINOPHEN 500 MG
1000 TABLET ORAL ONCE
Refills: 0 | Status: DISCONTINUED | OUTPATIENT
Start: 2022-04-24 | End: 2022-04-24

## 2022-04-24 RX ORDER — ACETAMINOPHEN 500 MG
650 TABLET ORAL EVERY 4 HOURS
Refills: 0 | Status: DISCONTINUED | OUTPATIENT
Start: 2022-04-24 | End: 2022-04-24

## 2022-04-24 RX ORDER — ACETAMINOPHEN 500 MG
975 TABLET ORAL EVERY 6 HOURS
Refills: 0 | Status: COMPLETED | OUTPATIENT
Start: 2022-04-24 | End: 2022-04-25

## 2022-04-24 RX ORDER — HYDROMORPHONE HYDROCHLORIDE 2 MG/ML
0.25 INJECTION INTRAMUSCULAR; INTRAVENOUS; SUBCUTANEOUS EVERY 4 HOURS
Refills: 0 | Status: DISCONTINUED | OUTPATIENT
Start: 2022-04-24 | End: 2022-04-26

## 2022-04-24 RX ORDER — OXYCODONE HYDROCHLORIDE 5 MG/1
5 TABLET ORAL EVERY 6 HOURS
Refills: 0 | Status: DISCONTINUED | OUTPATIENT
Start: 2022-04-24 | End: 2022-04-30

## 2022-04-24 RX ORDER — ONDANSETRON 8 MG/1
4 TABLET, FILM COATED ORAL EVERY 6 HOURS
Refills: 0 | Status: DISCONTINUED | OUTPATIENT
Start: 2022-04-24 | End: 2022-04-24

## 2022-04-24 RX ORDER — MORPHINE SULFATE 50 MG/1
2 CAPSULE, EXTENDED RELEASE ORAL ONCE
Refills: 0 | Status: DISCONTINUED | OUTPATIENT
Start: 2022-04-24 | End: 2022-04-24

## 2022-04-24 RX ORDER — CHLORHEXIDINE GLUCONATE 213 G/1000ML
1 SOLUTION TOPICAL EVERY 12 HOURS
Refills: 0 | Status: DISCONTINUED | OUTPATIENT
Start: 2022-04-24 | End: 2022-04-30

## 2022-04-24 RX ADMIN — Medication 650 MILLIGRAM(S): at 01:24

## 2022-04-24 RX ADMIN — Medication 975 MILLIGRAM(S): at 11:28

## 2022-04-24 RX ADMIN — Medication 975 MILLIGRAM(S): at 23:38

## 2022-04-24 RX ADMIN — CHLORHEXIDINE GLUCONATE 1 APPLICATION(S): 213 SOLUTION TOPICAL at 06:44

## 2022-04-24 RX ADMIN — HYDROMORPHONE HYDROCHLORIDE 0.25 MILLIGRAM(S): 2 INJECTION INTRAMUSCULAR; INTRAVENOUS; SUBCUTANEOUS at 23:30

## 2022-04-24 RX ADMIN — HYDROMORPHONE HYDROCHLORIDE 0.25 MILLIGRAM(S): 2 INJECTION INTRAMUSCULAR; INTRAVENOUS; SUBCUTANEOUS at 23:15

## 2022-04-24 RX ADMIN — Medication 975 MILLIGRAM(S): at 11:58

## 2022-04-24 RX ADMIN — Medication 40 MILLIEQUIVALENT(S): at 11:28

## 2022-04-24 RX ADMIN — MORPHINE SULFATE 2 MILLIGRAM(S): 50 CAPSULE, EXTENDED RELEASE ORAL at 11:46

## 2022-04-24 RX ADMIN — HYDROMORPHONE HYDROCHLORIDE 0.25 MILLIGRAM(S): 2 INJECTION INTRAMUSCULAR; INTRAVENOUS; SUBCUTANEOUS at 17:59

## 2022-04-24 RX ADMIN — MORPHINE SULFATE 2 MILLIGRAM(S): 50 CAPSULE, EXTENDED RELEASE ORAL at 11:31

## 2022-04-24 RX ADMIN — Medication 1 DROP(S): at 06:47

## 2022-04-24 RX ADMIN — ONDANSETRON 4 MILLIGRAM(S): 8 TABLET, FILM COATED ORAL at 16:48

## 2022-04-24 RX ADMIN — Medication 400 MILLIGRAM(S): at 06:47

## 2022-04-24 RX ADMIN — ONDANSETRON 4 MILLIGRAM(S): 8 TABLET, FILM COATED ORAL at 09:14

## 2022-04-24 RX ADMIN — Medication 1000 MILLIGRAM(S): at 07:03

## 2022-04-24 RX ADMIN — Medication 650 MILLIGRAM(S): at 01:54

## 2022-04-24 RX ADMIN — HYDROMORPHONE HYDROCHLORIDE 0.25 MILLIGRAM(S): 2 INJECTION INTRAMUSCULAR; INTRAVENOUS; SUBCUTANEOUS at 19:00

## 2022-04-24 NOTE — H&P ADULT - HISTORY OF PRESENT ILLNESS
31F with a past medical history of migraines on Sumitriptan PRN, pre-eclampsia requiring induction/, presented to [facility] @ 4am on  complaining of headache and visual disturbances. States it was sharp, worse than her usual migraines. CTA head revealed 3.2 x 1.7 cm acute R occipital intraparenchymal hemorrhage. MR Brain w/wo contrast in afternoon with stable hematoma, ?venous angioma. She was started on Keppra. Normotensive upon arrival. Patient transferred to [facility] via EMS per family request. Upon arrival to NSICU, vital signs within normal limits, neuro exam with L visual field deficit, consistent with sign out per [facility], otherwise no focal deficits. ICH=0. She complains of headache and "unable to focus her eyesight" and intermittent dizziness. Patient denies fever, drug use, recent travel, numbness, tingling, weakness, chest pain, SOB, abdominal pain, N/V, diarrhea, dysuria.    Of note, patient has two sisters with Hemophilia A.    31F with a past medical history of migraines on Sumitriptan PRN, pre-eclampsia requiring induction/, presented to [facility] @ 4am on  complaining of headache and visual disturbances. States it was sharp, worse than her usual migraines. CTA head revealed 3.2 x 1.7 cm acute R occipital intraparenchymal hemorrhage. MR Brain w/wo contrast in afternoon with stable hematoma, ?venous angioma. She was started on Keppra. Normotensive upon arrival. Patient transferred to [facility] via EMS per family request. Upon arrival to NSICU, vital signs within normal limits, neuro exam with L visual field deficit, consistent with sign out per [facility], otherwise no focal deficits. ICH=0, GCS 15. She complains of headache and "unable to focus her eyesight" and intermittent dizziness. Patient denies fever, drug use, recent travel, numbness, tingling, weakness, chest pain, SOB, abdominal pain, N/V, diarrhea, dysuria.    Of note, patient has two sisters with Hemophilia A.

## 2022-04-24 NOTE — H&P ADULT - CRITICAL CARE ATTENDING COMMENT
31 f with L field cut/HA p/w R occipital IPH, transferred from [facility]    neuro checks as above  stable repeat CTH  neuroIR for DSA  analgesia  oobtc
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
## 2022-04-24 NOTE — H&P ADULT - NSHPPHYSICALEXAM_GEN_ALL_CORE
General: Laying in bed, comfortable  Neuro: AAOx4, following commands, L visual field deficit, Strength 5/5 in upper and lower extremities, no drift with antigravity   Cardio: +S1,S2 RRR  Lung: CTABL  Abd: Soft NDNT  Ext: no swelling, no edema General: Laying in bed, comfortable  Neuro: AAOx4, following commands, L visual field deficit, PERRL, EOMI, tongue midline, finger to nose intact, 5/5 strength in b/l UE's and LE's, sensation intact to touch in all extremities, no pronator drift  Lung: CTABL  Abd: Soft NDNT  Ext: no swelling, no edema

## 2022-04-24 NOTE — PHYSICAL THERAPY INITIAL EVALUATION ADULT - TINETTI GAIT TEST, REHAB EVAL
Clear bilaterally, pupils equal, round and reactive to light.
total score 9/28. Based on this test pt is a high risk to fall.

## 2022-04-24 NOTE — H&P ADULT - NSICDXFAMILYHX_GEN_ALL_CORE_FT
FAMILY HISTORY:  Mother  Still living? Yes, Estimated age: 61-70  Family history of antiphospholipid syndrome, Age at diagnosis: Age Unknown  Family history of systemic lupus erythematosus (SLE) in mother, Age at diagnosis: Age Unknown    Sibling  Still living? Yes, Estimated age: 31-40  Family history of hemophilia A, Age at diagnosis: Age Unknown

## 2022-04-24 NOTE — PHYSICAL THERAPY INITIAL EVALUATION ADULT - GENERAL OBSERVATIONS, REHAB EVAL
pt seen in ICU. 2406-3793 pm. 32 y/o F rec'd/left in bed, nad, + tele. A+Ox4, pleasant and ageeable to PT evaluation. presents with LT visual field cut, however not able to tolerate formal evaluation at this time. tolerated getting OOB and ambulated 6 ft before returning to bed due to c/o frontal HA and photophobia. RN made aware, will administer IV pain meds. vitals: 102/61 84 95% on RA.

## 2022-04-24 NOTE — PATIENT PROFILE ADULT - FALL HARM RISK - HARM RISK INTERVENTIONS
Communicate Risk of Fall with Harm to all staff/Monitor for mental status changes/Reinforce activity limits and safety measures with patient and family/Reorient to person, place and time as needed/Sit up slowly, dangle for a short time, stand at bedside before walking/Tailored Fall Risk Interventions/Visual Cue: Yellow wristband and red socks/Bed in lowest position, wheels locked, appropriate side rails in place/Call bell, personal items and telephone in reach/Instruct patient to call for assistance before getting out of bed or chair/Non-slip footwear when patient is out of bed/[place] to call system/Physically safe environment - no spills, clutter or unnecessary equipment/Purposeful Proactive Rounding/Room/bathroom lighting operational, light cord in reach

## 2022-04-24 NOTE — H&P ADULT - ASSESSMENT
N31F with pmhx of migraines transferred from OSH for mgmt of acute R occipital intraparenchymal hemorrhage. Physical exam with left visual field deficit. Vital signs within normal  limits. GCS 15, ICH 0.   NEURO:  # Acute R Occipital Hematoma  Repeat CTH in morning  Neuro endovasc for CTA  q1h neurochecks   tylenol PRN for pain  Activity: [] OOB as tolerated [X] Bedrest [] PT [] OT [] PMNR    PULM:  Maintain SpO2 >92%    CV:  SBP <140  TTE    RENAL:  Strict Is and Os    GI:  NPO, sips with meds  GI prophylaxis [X] not indicated [] PPI [] other:    ENDO:   Goal euglycemia (-180)    HEME/ONC:  VTE prophylaxis: [X] SCDs [] chemoprophylaxis [] hold chemoprophylaxis due to: [] high risk of DVT/PE on admission due to:  # Family Hx Hemophilia A  - Consider Heme C/s   - Send coags    ID:  WBC 14 at OSH  No indication for ABX at this time    MISC:    CODE STATUS:  [] Full Code [] DNR [] DNI [] Palliative/Comfort Care    DISPOSITION:  [X] ICU [] Stroke Unit [] Floor [] EMU [] RCU [] PCU    [] Patient is at high risk of neurologic deterioration/death due to:     Time seen:  Time spent: ___ [] critical care minutes    Contact: [phone] N31F with pmhx of migraines transferred from OSH for mgmt of acute R occipital intraparenchymal hemorrhage. Physical exam with left visual field deficit. Vital signs within normal  limits. GCS 15, ICH 0.   NEURO:  # Acute R Occipital Hematoma  Repeat CTH in morning  Angiogram when able  q1h neurochecks   tylenol PRN for pain  Activity: [] OOB as tolerated [X] Bedrest [] PT [] OT [] PMNR    PULM:  Maintain SpO2 >92%    CV:  SBP <140  TTE    RENAL:  Strict Is and Os    GI:  NPO, sips with meds  GI prophylaxis [X] not indicated [] PPI [] other:    ENDO:   Goal euglycemia (-180)    HEME/ONC:  VTE prophylaxis: [X] SCDs    # Family Hx Hemophilia A  - Consider Heme C/s   - Send coags    ID:  WBC 14 at OSH  No indication for ABX at this time    MISC:    CODE STATUS:  [] Full Code [] DNR [] DNI [] Palliative/Comfort Care    DISPOSITION:  [X] ICU [] Stroke Unit [] Floor [] EMU [] RCU [] PCU    [] Patient is at high risk of neurologic deterioration/death due to:     Time seen:  Time spent: ___ [] critical care minutes    Contact: [phone] 31F with pmhx of migraines transferred from OSH for mgmt of acute R occipital intraparenchymal hemorrhage. Physical exam with left visual field deficit. Vital signs within normal  limits. GCS 15, ICH 0.     NEURO:  # Acute R Occipital Hematoma  CTH - interval stability imaging this AM on 4/24 (compared to read from [facility] in AM on 4/23)- stable IPH  CTA/MR w/w/o at [facility] in chart- concern for possible venous anomaly   Neuro-IR consultation for DSA  q4h neurochecks   tylenol/sumatriptan  Activity: [x] OOB as tolerated [] Bedrest [x] PT [x] OT [] PMNR    PULM:  Maintain SpO2 >92%    CV:  SBP <140    RENAL:  IVL  voiding freely   goal normonatremia    GI:  full diet  anti-emetics  GI prophylaxis [X] not indicated [] PPI [] other:    ENDO:   serum glucose normal     HEME/ONC:  VTE prophylaxis: [X] SCDs  chemoprophylaxis pending stability imaging- hsq  Family Hx vWD and APL  coags wnl    ID:  likely stress induced leukocytosis  no sequelae of infection    MISC:    CODE STATUS:  [x] Full Code [] DNR [] DNI [] Palliative/Comfort Care    DISPOSITION:  [X] ICU [] Stroke Unit [] Floor [] EMU [] RCU [] PCU    [] Patient is at high risk of neurologic deterioration/death due to:  ICH    Time seen:  Time spent: __45_ [] critical care minutes     31F with pmhx of migraines transferred from OSH for mgmt of acute R occipital intraparenchymal hemorrhage. Physical exam with left visual field deficit. Vital signs within normal  limits. GCS 15, ICH 0.     NEURO:  # Acute R Occipital Hematoma  CTH - interval stability imaging this AM on 4/24 (compared to read from [facility] in AM on 4/23)- stable IPH  CTA/MR w/w/o at [facility] in chart- concern for possible venous anomaly   Neuro-IR consultation for DSA  q4h neurochecks   tylenol/sumatriptan  Activity: [x] OOB as tolerated [] Bedrest [x] PT [x] OT [] PMNR    PULM:  Maintain SpO2 >92%    CV:  SBP <140    RENAL:  IVL  voiding freely   goal normonatremia    GI:  full diet  anti-emetics  GI prophylaxis [X] not indicated [] PPI [] other:    ENDO:   serum glucose normal     HEME/ONC:  VTE prophylaxis: [X] SCDs  chemoprophylaxis pending stability imaging- hsq  Family Hx vWD and APL  coags wnl  f/u vwf activity, antigen,     ID:  likely stress induced leukocytosis  no sequelae of infection    MISC:    CODE STATUS:  [x] Full Code [] DNR [] DNI [] Palliative/Comfort Care    DISPOSITION:  [X] ICU [] Stroke Unit [] Floor [] EMU [] RCU [] PCU    [] Patient is at high risk of neurologic deterioration/death due to:  ICH    Time seen:  Time spent: __45_ [] critical care minutes

## 2022-04-25 LAB
ALBUMIN SERPL ELPH-MCNC: 4.1 G/DL — SIGNIFICANT CHANGE UP (ref 3.5–5.2)
ALP SERPL-CCNC: 108 U/L — SIGNIFICANT CHANGE UP (ref 30–115)
ALT FLD-CCNC: 31 U/L — SIGNIFICANT CHANGE UP (ref 0–41)
ANION GAP SERPL CALC-SCNC: 10 MMOL/L — SIGNIFICANT CHANGE UP (ref 7–14)
ANION GAP SERPL CALC-SCNC: 15 MMOL/L — HIGH (ref 7–14)
APTT BLD: 29.7 SEC — SIGNIFICANT CHANGE UP (ref 27–39.2)
AST SERPL-CCNC: 21 U/L — SIGNIFICANT CHANGE UP (ref 0–41)
BASOPHILS # BLD AUTO: 0.04 K/UL — SIGNIFICANT CHANGE UP (ref 0–0.2)
BASOPHILS NFR BLD AUTO: 0.2 % — SIGNIFICANT CHANGE UP (ref 0–1)
BILIRUB SERPL-MCNC: 0.4 MG/DL — SIGNIFICANT CHANGE UP (ref 0.2–1.2)
BLD GP AB SCN SERPL QL: SIGNIFICANT CHANGE UP
BUN SERPL-MCNC: 11 MG/DL — SIGNIFICANT CHANGE UP (ref 10–20)
BUN SERPL-MCNC: 12 MG/DL — SIGNIFICANT CHANGE UP (ref 10–20)
CALCIUM SERPL-MCNC: 8.7 MG/DL — SIGNIFICANT CHANGE UP (ref 8.5–10.1)
CALCIUM SERPL-MCNC: 9.1 MG/DL — SIGNIFICANT CHANGE UP (ref 8.5–10.1)
CHLORIDE SERPL-SCNC: 100 MMOL/L — SIGNIFICANT CHANGE UP (ref 98–110)
CHLORIDE SERPL-SCNC: 103 MMOL/L — SIGNIFICANT CHANGE UP (ref 98–110)
CO2 SERPL-SCNC: 21 MMOL/L — SIGNIFICANT CHANGE UP (ref 17–32)
CO2 SERPL-SCNC: 26 MMOL/L — SIGNIFICANT CHANGE UP (ref 17–32)
CREAT SERPL-MCNC: 0.6 MG/DL — LOW (ref 0.7–1.5)
CREAT SERPL-MCNC: 0.7 MG/DL — SIGNIFICANT CHANGE UP (ref 0.7–1.5)
CRP SERPL-MCNC: 17 MG/L — HIGH
EGFR: 117 ML/MIN/1.73M2 — SIGNIFICANT CHANGE UP
EGFR: 121 ML/MIN/1.73M2 — SIGNIFICANT CHANGE UP
EOSINOPHIL # BLD AUTO: 0.01 K/UL — SIGNIFICANT CHANGE UP (ref 0–0.7)
EOSINOPHIL NFR BLD AUTO: 0.1 % — SIGNIFICANT CHANGE UP (ref 0–8)
GLUCOSE SERPL-MCNC: 111 MG/DL — HIGH (ref 70–99)
GLUCOSE SERPL-MCNC: 137 MG/DL — HIGH (ref 70–99)
HCT VFR BLD CALC: 37.2 % — SIGNIFICANT CHANGE UP (ref 37–47)
HGB BLD-MCNC: 12.6 G/DL — SIGNIFICANT CHANGE UP (ref 12–16)
IMM GRANULOCYTES NFR BLD AUTO: 0.3 % — SIGNIFICANT CHANGE UP (ref 0.1–0.3)
INR BLD: 1.15 RATIO — SIGNIFICANT CHANGE UP (ref 0.65–1.3)
LYMPHOCYTES # BLD AUTO: 1.64 K/UL — SIGNIFICANT CHANGE UP (ref 1.2–3.4)
LYMPHOCYTES # BLD AUTO: 9.3 % — LOW (ref 20.5–51.1)
MAGNESIUM SERPL-MCNC: 1.9 MG/DL — SIGNIFICANT CHANGE UP (ref 1.8–2.4)
MAGNESIUM SERPL-MCNC: 2.1 MG/DL — SIGNIFICANT CHANGE UP (ref 1.8–2.4)
MCHC RBC-ENTMCNC: 26.5 PG — LOW (ref 27–31)
MCHC RBC-ENTMCNC: 33.9 G/DL — SIGNIFICANT CHANGE UP (ref 32–37)
MCV RBC AUTO: 78.2 FL — LOW (ref 81–99)
MONOCYTES # BLD AUTO: 0.84 K/UL — HIGH (ref 0.1–0.6)
MONOCYTES NFR BLD AUTO: 4.8 % — SIGNIFICANT CHANGE UP (ref 1.7–9.3)
NEUTROPHILS # BLD AUTO: 15.08 K/UL — HIGH (ref 1.4–6.5)
NEUTROPHILS NFR BLD AUTO: 85.3 % — HIGH (ref 42.2–75.2)
NRBC # BLD: 0 /100 WBCS — SIGNIFICANT CHANGE UP (ref 0–0)
PLATELET # BLD AUTO: 441 K/UL — HIGH (ref 130–400)
POTASSIUM SERPL-MCNC: 3.9 MMOL/L — SIGNIFICANT CHANGE UP (ref 3.5–5)
POTASSIUM SERPL-MCNC: 4.6 MMOL/L — SIGNIFICANT CHANGE UP (ref 3.5–5)
POTASSIUM SERPL-SCNC: 3.9 MMOL/L — SIGNIFICANT CHANGE UP (ref 3.5–5)
POTASSIUM SERPL-SCNC: 4.6 MMOL/L — SIGNIFICANT CHANGE UP (ref 3.5–5)
PROT SERPL-MCNC: 6.7 G/DL — SIGNIFICANT CHANGE UP (ref 6–8)
PROTHROM AB SERPL-ACNC: 13.2 SEC — HIGH (ref 9.95–12.87)
RBC # BLD: 4.76 M/UL — SIGNIFICANT CHANGE UP (ref 4.2–5.4)
RBC # FLD: 13.8 % — SIGNIFICANT CHANGE UP (ref 11.5–14.5)
SODIUM SERPL-SCNC: 136 MMOL/L — SIGNIFICANT CHANGE UP (ref 135–146)
SODIUM SERPL-SCNC: 139 MMOL/L — SIGNIFICANT CHANGE UP (ref 135–146)
WBC # BLD: 17.67 K/UL — HIGH (ref 4.8–10.8)
WBC # FLD AUTO: 17.67 K/UL — HIGH (ref 4.8–10.8)

## 2022-04-25 PROCEDURE — 99233 SBSQ HOSP IP/OBS HIGH 50: CPT

## 2022-04-25 PROCEDURE — 99291 CRITICAL CARE FIRST HOUR: CPT

## 2022-04-25 RX ORDER — SUMATRIPTAN SUCCINATE 4 MG/.5ML
100 INJECTION, SOLUTION SUBCUTANEOUS DAILY
Refills: 0 | Status: DISCONTINUED | OUTPATIENT
Start: 2022-04-25 | End: 2022-04-25

## 2022-04-25 RX ORDER — MAGNESIUM SULFATE 500 MG/ML
2 VIAL (ML) INJECTION ONCE
Refills: 0 | Status: COMPLETED | OUTPATIENT
Start: 2022-04-25 | End: 2022-04-25

## 2022-04-25 RX ORDER — METOCLOPRAMIDE HCL 10 MG
10 TABLET ORAL EVERY 6 HOURS
Refills: 0 | Status: DISCONTINUED | OUTPATIENT
Start: 2022-04-25 | End: 2022-04-27

## 2022-04-25 RX ADMIN — HEPARIN SODIUM 5000 UNIT(S): 5000 INJECTION INTRAVENOUS; SUBCUTANEOUS at 14:59

## 2022-04-25 RX ADMIN — SODIUM CHLORIDE 75 MILLILITER(S): 9 INJECTION, SOLUTION INTRAVENOUS at 00:00

## 2022-04-25 RX ADMIN — Medication 1 DROP(S): at 22:18

## 2022-04-25 RX ADMIN — SODIUM CHLORIDE 75 MILLILITER(S): 9 INJECTION, SOLUTION INTRAVENOUS at 10:35

## 2022-04-25 RX ADMIN — Medication 975 MILLIGRAM(S): at 00:08

## 2022-04-25 RX ADMIN — HYDROMORPHONE HYDROCHLORIDE 0.25 MILLIGRAM(S): 2 INJECTION INTRAMUSCULAR; INTRAVENOUS; SUBCUTANEOUS at 15:10

## 2022-04-25 RX ADMIN — HEPARIN SODIUM 5000 UNIT(S): 5000 INJECTION INTRAVENOUS; SUBCUTANEOUS at 22:17

## 2022-04-25 RX ADMIN — OXYCODONE HYDROCHLORIDE 5 MILLIGRAM(S): 5 TABLET ORAL at 16:39

## 2022-04-25 RX ADMIN — HYDROMORPHONE HYDROCHLORIDE 0.25 MILLIGRAM(S): 2 INJECTION INTRAMUSCULAR; INTRAVENOUS; SUBCUTANEOUS at 05:32

## 2022-04-25 RX ADMIN — HYDROMORPHONE HYDROCHLORIDE 0.25 MILLIGRAM(S): 2 INJECTION INTRAMUSCULAR; INTRAVENOUS; SUBCUTANEOUS at 10:35

## 2022-04-25 RX ADMIN — HYDROMORPHONE HYDROCHLORIDE 0.25 MILLIGRAM(S): 2 INJECTION INTRAMUSCULAR; INTRAVENOUS; SUBCUTANEOUS at 16:00

## 2022-04-25 RX ADMIN — Medication 10 MILLIGRAM(S): at 10:30

## 2022-04-25 RX ADMIN — HYDROMORPHONE HYDROCHLORIDE 0.25 MILLIGRAM(S): 2 INJECTION INTRAMUSCULAR; INTRAVENOUS; SUBCUTANEOUS at 22:32

## 2022-04-25 RX ADMIN — HYDROMORPHONE HYDROCHLORIDE 0.25 MILLIGRAM(S): 2 INJECTION INTRAMUSCULAR; INTRAVENOUS; SUBCUTANEOUS at 10:51

## 2022-04-25 RX ADMIN — Medication 10 MILLIGRAM(S): at 16:38

## 2022-04-25 RX ADMIN — Medication 975 MILLIGRAM(S): at 05:32

## 2022-04-25 RX ADMIN — OXYCODONE HYDROCHLORIDE 5 MILLIGRAM(S): 5 TABLET ORAL at 08:19

## 2022-04-25 RX ADMIN — HYDROMORPHONE HYDROCHLORIDE 0.25 MILLIGRAM(S): 2 INJECTION INTRAMUSCULAR; INTRAVENOUS; SUBCUTANEOUS at 05:47

## 2022-04-25 RX ADMIN — Medication 25 GRAM(S): at 08:20

## 2022-04-25 RX ADMIN — OXYCODONE HYDROCHLORIDE 5 MILLIGRAM(S): 5 TABLET ORAL at 09:00

## 2022-04-25 RX ADMIN — CHLORHEXIDINE GLUCONATE 1 APPLICATION(S): 213 SOLUTION TOPICAL at 05:33

## 2022-04-25 RX ADMIN — HYDROMORPHONE HYDROCHLORIDE 0.25 MILLIGRAM(S): 2 INJECTION INTRAMUSCULAR; INTRAVENOUS; SUBCUTANEOUS at 22:17

## 2022-04-25 RX ADMIN — OXYCODONE HYDROCHLORIDE 5 MILLIGRAM(S): 5 TABLET ORAL at 17:16

## 2022-04-25 RX ADMIN — Medication 1 DROP(S): at 04:13

## 2022-04-25 RX ADMIN — ONDANSETRON 4 MILLIGRAM(S): 8 TABLET, FILM COATED ORAL at 08:19

## 2022-04-25 RX ADMIN — Medication 975 MILLIGRAM(S): at 06:02

## 2022-04-25 NOTE — PROGRESS NOTE ADULT - ASSESSMENT
31F with pmhx of migraines transferred from OSH for mgmt of acute R occipital intraparenchymal hemorrhage. Physical exam with left visual field deficit. Vital signs within normal  limits. GCS 15, ICH 0.     NEURO:  # Acute R Occipital Hematoma  CTH - interval stability imaging this AM on 4/24 (compared to read from [facility] in AM on 4/23)- stable IPH  CTA/MR w/w/o at [facility] in chart- concern for possible venous anomaly   Neuro-IR consultation for DSA  q4h neurochecks   tylenol/sumatriptan  Activity: [x] OOB as tolerated [] Bedrest [x] PT [x] OT [] PMNR    PULM:  Maintain SpO2 >92%    CV:  SBP <140    RENAL:  IVL  voiding freely   goal normonatremia    GI:  full diet  anti-emetics  GI prophylaxis [X] not indicated [] PPI [] other:    ENDO:   serum glucose normal     HEME/ONC:  VTE prophylaxis: [X] SCDs  chemoprophylaxis pending stability imaging- hsq  Family Hx vWD and APL  coags wnl  f/u vwf activity, antigen,     ID:  likely stress induced leukocytosis  no sequelae of infection    MISC:    CODE STATUS:  [x] Full Code [] DNR [] DNI [] Palliative/Comfort Care    DISPOSITION:  [X] ICU [] Stroke Unit [] Floor [] EMU [] RCU [] PCU 31F with pmhx of migraines transferred from OSH for mgmt of acute R occipital intraparenchymal hemorrhage. Physical exam with left visual field deficit. Vital signs within normal  limits. GCS 15, ICH 0.     NEURO:  # Acute R Occipital Hematoma  CTH - interval stability imaging on 4/24 (compared to read from [facility] in AM on 4/23)- stable IPH  CTA/MR w/w/o at [facility] in chart- concern for possible venous anomaly   Neuro-IR consultation for DSA  q4h neurochecks   tylenol/oxycodone/dilaudid PRN  Activity: [x] OOB as tolerated [] Bedrest [x] PT [x] OT [] PMNR    PULM:  Maintain SpO2 >92%    CV:  SBP <140    RENAL:  Normosol while NPO  voiding freely   goal normonatremia    GI:  NPO for possible DSA today  anti-emetics  GI prophylaxis [X] not indicated [] PPI [] other:    ENDO:   serum glucose normal     HEME/ONC:  VTE prophylaxis: SCDs, SQH  Family Hx vWD and APL  coags wnl  f/u vwf activity, antigen,     ID:  likely stress induced leukocytosis  no sequelae of infection    MISC:    CODE STATUS:  [x] Full Code [] DNR [] DNI [] Palliative/Comfort Care    DISPOSITION:  [X] ICU [] Stroke Unit [] Floor [] CEU 31F with pmhx of migraines transferred from OSH for mgmt of acute R occipital intraparenchymal hemorrhage. Physical exam with left visual field deficit. Vital signs within normal  limits. GCS 15, ICH 0.     NEURO:  # Acute R Occipital Hematoma  CTH - interval stability imaging on 4/24 (compared to read from [facility] in AM on 4/23)- stable IPH  CTA/MR w/w/o at [facility] in chart- concern for possible venous anomaly   Neuro-IR consultation for DSA  CTV brain  q4h neurochecks   tylenol/oxycodone/dilaudid PRN  Activity: [x] OOB as tolerated [] Bedrest [x] PT [x] OT [] PMNR    PULM:  Maintain SpO2 >92%    CV:  SBP <140    RENAL:  Normosol while NPO  voiding freely   goal normonatremia    GI:  NPO for possible DSA today  anti-emetics  GI prophylaxis [X] not indicated [] PPI [] other:    ENDO:   serum glucose normal     HEME/ONC:  VTE prophylaxis: SCDs, SQH  Family Hx vWD and APL  coags wnl  f/u vwf activity, antigen,     ID:  likely stress induced leukocytosis  no sequelae of infection    MISC:    CODE STATUS:  [x] Full Code [] DNR [] DNI [] Palliative/Comfort Care    DISPOSITION:  [X] ICU [] Stroke Unit [] Floor [] CEU

## 2022-04-25 NOTE — OCCUPATIONAL THERAPY INITIAL EVALUATION ADULT - GENERAL OBSERVATIONS, REHAB EVAL
Pt received semi gamez in bed in NAD, agreeable to OT evaluation, +tele, +BP cuff, +pulse oxi, +IV drip, left semi gamez in bed in NAD, RN aware, mother present at bedside throughout.

## 2022-04-25 NOTE — PHARMACOTHERAPY INTERVENTION NOTE - COMMENTS
sumatriptan 100mg po q24h prn migraine, non formulary  -sumatriptan inj available, recommended changing to 6mg sc x1 for migraine, may repeat x1 (max 12 mg/day)  -per NCC team, would like to start metoclopramide 10mg IV q6h prn n/v/migraine
acetaminophen 1g IV x1, pt received dose @ 0650, d/w NCC, d/c IV APAP, will start APAP 975mg po q6h ATC @1200

## 2022-04-25 NOTE — CONSULT NOTE ADULT - ATTENDING COMMENTS
A 32 YO WOMAN W/H/O POLYCYSTIC OVARIAN SYNDROME, PRE-ECLAMPSIA (HAS BEEN LATELY NORMOTENSIVE W/O MEDS) AND MIGRAINE HA, DEVELOPED SUDDEN/SEVERE/SHARP/NON-POUNDING/UNUSUAL HEADACHE ON RT PARIETAL AREA. SHE TOOK ONE TAB OF TRIPTAN AT THE ONSET BUT NO RELIEF. SHE DENIES SMOKING, DRINKING, DOING ANY RECREATIONAL OR STIMULANT AGENTS/DRUGS, USING HERBS OR STIMULANTS. SHE IS NOT ON HORMONAL THERAPY AND IS NOT ON ANY MEDICATION, BESIDE THE TRIPTAN PRN. SHE DENIES H/O CLOT FORMATION OR MISSCARRIAGE; HOWEVER REPORTEDLY HER MOTHER MAY HAVE ANTIPHOSPHOLIPID SYNDROME AND QUESTIONABLE AUTOIMMUNE DZ.   ON THIS ADMISSION HER CTH SHOWED A VEDGE-SHAPE CORTICAL RT PARIETO-OCCIPITAL IPH.     BASED ON ABOVE, RCVS AND VASCULOPATHIES, INCLUDING INFLAMMATORY AN NON-INFLAMMATORY NEEDS TO BE INVESTIGATED AMONGST THE DDX. THIS WAS DISCUSSED WITH THE PATIENT AND DR. BERG, THE NCC ATTENDING ON THE CASE, WHO AGREED.

## 2022-04-25 NOTE — OCCUPATIONAL THERAPY INITIAL EVALUATION ADULT - PERTINENT HX OF CURRENT PROBLEM, REHAB EVAL
31F with a past medical history of migraines on Sumitriptan PRN, pre-eclampsia requiring induction/, presented to [facility] @ 4am on  complaining of headache and visual disturbances. States it was sharp, worse than her usual migraines. CTA head revealed 3.2 x 1.7 cm acute R occipital intraparenchymal hemorrhage

## 2022-04-25 NOTE — PROGRESS NOTE ADULT - SUBJECTIVE AND OBJECTIVE BOX
SUMMARY: 31F with a past medical history of migraines on Sumitriptan PRN, pre-eclampsia requiring induction/, presented to [facility] @ 4am on  complaining of headache and visual disturbances. States it was sharp, worse than her usual migraines. CTA head revealed 3.2 x 1.7 cm acute R occipital intraparenchymal hemorrhage. MR Brain w/wo contrast in afternoon with stable hematoma, ?venous angioma. She was started on Keppra. Normotensive upon arrival. Patient transferred to [facility] via EMS per family request. Upon arrival to NSICU, vital signs within normal limits, neuro exam with L visual field deficit, consistent with sign out per [facility], otherwise no focal deficits. ICH=0, GCS 15. She complains of headache and "unable to focus her eyesight" and intermittent dizziness. Patient denies fever, drug use, recent travel, numbness, tingling, weakness, chest pain, SOB, abdominal pain, N/V, diarrhea, dysuria.    Of note, patient has two sisters with Hemophilia A.     OVERNIGHT EVENTS: No acute events ON    ADMISSION SCORES:   ICH Score: 0    REVIEW OF SYSTEMS:    VITALS: [x] Reviewed    IMAGING/DATA: [x] Reviewed    IV FLUIDS/MEDICATIONS: [x] Reviewed    ALLERGIES: Allergies    No Known Allergies    Intolerances      EXAMINATION:  General: No acute distress  HEENT: Anicteric sclerae  Cardiac: [id number]  Lungs: Clear  Abdomen: Soft, non-tender, +BS  Extremities: No c/c/e  Skin/Incision Site: Clean, dry and intact  Neurologic: AAOx4, following commands, L visual field deficit, PERRL, EOMI, tongue midline, finger to nose intact, 5/5 strength in b/l UE's and LE's, sensation intact to touch in all extremities, no pronator drift          ICU Vital Signs Last 24 Hrs  T(C): 36.2 (2022 20:00), Max: 36.2 (2022 20:00)  T(F): 97.2 (2022 20:00), Max: 97.2 (2022 20:00)  HR: 86 (2022 06:00) (68 - 96)  BP: 129/74 (2022 06:00) (97/55 - 137/77)  BP(mean): 91 (2022 06:00) (66 - 117)  ABP: --  ABP(mean): --  RR: 12 (2022 06:00) (9 - 37)  SpO2: 96% (2022 06:00) (91% - 99%)      22 @ 07:01  -  22 @ 07:00  --------------------------------------------------------  IN: 600 mL / OUT: 0 mL / NET: 600 mL            artificial  tears Solution 1 Drop(s) Both EYES every 4 hours PRN  chlorhexidine 4% Liquid 1 Application(s) Topical every 12 hours  heparin   Injectable 5000 Unit(s) SubCutaneous every 8 hours  HYDROmorphone  Injectable 0.25 milliGRAM(s) IV Push every 4 hours PRN  multiple electrolytes Injection Type 1 1000 milliLiter(s) (75 mL/Hr) IV Continuous <Continuous>  ondansetron Injectable 4 milliGRAM(s) IV Push every 6 hours PRN  oxyCODONE    IR 5 milliGRAM(s) Oral every 6 hours PRN      LABS:  Na: 136 ( @ 05:30), 135 ( 01:50)  K: 4.6 ( 05:30), 3.7 ( 01:50)  Cl: 100 ( 05:30), 101 ( 01:50)  CO2: 21 ( 05:30), 23 ( 01:50)  BUN: 12 ( 05:30), 14 ( 01:50)  Cr: 0.7 ( 05:30), 0.7 ( 01:50)  Glu: 111( 05:30), 97( 01:50)    Hgb: 12.6 ( 05:30), 12.8 ( 01:50)  Hct: 37.2 ( 05:30), 38.3 (04-24 @ 01:50)  WBC: 17.67 ( @ 05:30), 12.46 ( @ 01:50)  Plt: 441 ( @ 05:30), 432 ( @ :50)    INR: 1.15 22 @ 05:30, 1.11 22 @ 01:50  PTT: 29.7 22 @ 05:30, 30.4 22 @ 01:50          LIVER FUNCTIONS - ( 2022 05:30 )  Alb: 4.1 g/dL / Pro: 6.7 g/dL / ALK PHOS: 108 U/L / ALT: 31 U/L / AST: 21 U/L / GGT: x                SUMMARY: 31F with a past medical history of migraines on Sumitriptan PRN, pre-eclampsia requiring induction/, presented to [facility] @ 4am on  complaining of headache and visual disturbances. States it was sharp, worse than her usual migraines. CTA head revealed 3.2 x 1.7 cm acute R occipital intraparenchymal hemorrhage. MR Brain w/wo contrast in afternoon with stable hematoma, ?venous angioma. She was started on Keppra. Normotensive upon arrival. Patient transferred to [facility] via EMS per family request. Upon arrival to NSICU, vital signs within normal limits, neuro exam with L visual field deficit, consistent with sign out per [facility], otherwise no focal deficits. ICH=0, GCS 15. She complains of headache and "unable to focus her eyesight" and intermittent dizziness. Patient denies fever, drug use, recent travel, numbness, tingling, weakness, chest pain, SOB, abdominal pain, N/V, diarrhea, dysuria.    Of note, patient has two sisters with Hemophilia A.     OVERNIGHT EVENTS: No acute events ON    ADMISSION SCORES:   ICH Score: 0    REVIEW OF SYSTEMS: Pt endorses HA, otherwise ROS negative    VITALS: [x] Reviewed    IMAGING/DATA: [x] Reviewed    IV FLUIDS/MEDICATIONS: [x] Reviewed    ALLERGIES: Allergies    No Known Allergies    Intolerances      EXAMINATION:  General: No acute distress  HEENT: Anicteric sclerae  Cardiac: [id number]  Lungs: Clear  Abdomen: Soft, non-tender, +BS  Extremities: No c/c/e  Skin/Incision Site: Clean, dry and intact  Neurologic: AAOx4, following commands, L visual field deficit, PERRL, EOMI, tongue midline, finger to nose intact, 5/5 strength in b/l UE's and LE's, sensation intact to touch in all extremities, no pronator drift          ICU Vital Signs Last 24 Hrs  T(C): 36.2 (2022 20:00), Max: 36.2 (2022 20:00)  T(F): 97.2 (2022 20:00), Max: 97.2 (2022 20:00)  HR: 86 (2022 06:00) (68 - 96)  BP: 129/74 (2022 06:00) (97/55 - 137/77)  BP(mean): 91 (2022 06:00) (66 - 117)  ABP: --  ABP(mean): --  RR: 12 (2022 06:00) (9 - 37)  SpO2: 96% (2022 06:00) (91% - 99%)      22 @ 07:01  -  22 @ 07:00  --------------------------------------------------------  IN: 600 mL / OUT: 0 mL / NET: 600 mL            artificial  tears Solution 1 Drop(s) Both EYES every 4 hours PRN  chlorhexidine 4% Liquid 1 Application(s) Topical every 12 hours  heparin   Injectable 5000 Unit(s) SubCutaneous every 8 hours  HYDROmorphone  Injectable 0.25 milliGRAM(s) IV Push every 4 hours PRN  multiple electrolytes Injection Type 1 1000 milliLiter(s) (75 mL/Hr) IV Continuous <Continuous>  ondansetron Injectable 4 milliGRAM(s) IV Push every 6 hours PRN  oxyCODONE    IR 5 milliGRAM(s) Oral every 6 hours PRN      LABS:  Na: 136 ( @ 05:30), 135 ( 01:50)  K: 4.6 ( 05:30), 3.7 ( 01:50)  Cl: 100 ( 05:30), 101 ( 01:50)  CO2: 21 ( 05:30), 23 ( 01:50)  BUN: 12 ( 05:30), 14 ( 01:50)  Cr: 0.7 ( 05:30), 0.7 ( 01:50)  Glu: 111( 05:30), 97( 01:50)    Hgb: 12.6 ( 05:30), 12.8 ( 01:50)  Hct: 37.2 ( 05:30), 38.3 (04-24 @ 01:50)  WBC: 17.67 ( @ 05:30), 12.46 ( @ :50)  Plt: 441 ( @ 05:30), 432 (:50)    INR: 1.15 22 @ 05:30, 1.11 22 @ 01:50  PTT: 29.7 22 @ 05:30, 30.4 22 @ 01:50          LIVER FUNCTIONS - ( 2022 05:30 )  Alb: 4.1 g/dL / Pro: 6.7 g/dL / ALK PHOS: 108 U/L / ALT: 31 U/L / AST: 21 U/L / GGT: x

## 2022-04-25 NOTE — CONSULT NOTE ADULT - CONSULT REASON
Patient admitted with IPH/possible angioma on MRI from [facility]. Evaluation for possible angiogram/further workup.

## 2022-04-25 NOTE — OCCUPATIONAL THERAPY INITIAL EVALUATION ADULT - PLANNED THERAPY INTERVENTIONS, OT EVAL
ADL retraining/balance training/bed mobility training/cognitive, visual perceptual/neuromuscular re-education/parent/caregiver training.../ROM/strengthening/transfer training

## 2022-04-26 LAB
ALBUMIN SERPL ELPH-MCNC: 4.1 G/DL — SIGNIFICANT CHANGE UP (ref 3.5–5.2)
ALP SERPL-CCNC: 118 U/L — HIGH (ref 30–115)
ALT FLD-CCNC: 40 U/L — SIGNIFICANT CHANGE UP (ref 0–41)
ANION GAP SERPL CALC-SCNC: 11 MMOL/L — SIGNIFICANT CHANGE UP (ref 7–14)
AST SERPL-CCNC: 32 U/L — SIGNIFICANT CHANGE UP (ref 0–41)
BASOPHILS # BLD AUTO: 0.03 K/UL — SIGNIFICANT CHANGE UP (ref 0–0.2)
BASOPHILS NFR BLD AUTO: 0.2 % — SIGNIFICANT CHANGE UP (ref 0–1)
BILIRUB SERPL-MCNC: 0.3 MG/DL — SIGNIFICANT CHANGE UP (ref 0.2–1.2)
BUN SERPL-MCNC: 9 MG/DL — LOW (ref 10–20)
CALCIUM SERPL-MCNC: 9 MG/DL — SIGNIFICANT CHANGE UP (ref 8.5–10.1)
CHLORIDE SERPL-SCNC: 101 MMOL/L — SIGNIFICANT CHANGE UP (ref 98–110)
CO2 SERPL-SCNC: 25 MMOL/L — SIGNIFICANT CHANGE UP (ref 17–32)
CREAT SERPL-MCNC: 0.7 MG/DL — SIGNIFICANT CHANGE UP (ref 0.7–1.5)
EGFR: 117 ML/MIN/1.73M2 — SIGNIFICANT CHANGE UP
EOSINOPHIL # BLD AUTO: 0.07 K/UL — SIGNIFICANT CHANGE UP (ref 0–0.7)
EOSINOPHIL NFR BLD AUTO: 0.5 % — SIGNIFICANT CHANGE UP (ref 0–8)
ERYTHROCYTE [SEDIMENTATION RATE] IN BLOOD: 68 MM/HR — HIGH (ref 0–20)
FACT VIII ACT/NOR PPP: 99 % — SIGNIFICANT CHANGE UP (ref 50–150)
GLUCOSE SERPL-MCNC: 100 MG/DL — HIGH (ref 70–99)
HCG SERPL QL: NEGATIVE — SIGNIFICANT CHANGE UP
HCT VFR BLD CALC: 37.8 % — SIGNIFICANT CHANGE UP (ref 37–47)
HGB BLD-MCNC: 12.3 G/DL — SIGNIFICANT CHANGE UP (ref 12–16)
IMM GRANULOCYTES NFR BLD AUTO: 0.4 % — HIGH (ref 0.1–0.3)
LYMPHOCYTES # BLD AUTO: 16.1 % — LOW (ref 20.5–51.1)
LYMPHOCYTES # BLD AUTO: 2.35 K/UL — SIGNIFICANT CHANGE UP (ref 1.2–3.4)
MAGNESIUM SERPL-MCNC: 2 MG/DL — SIGNIFICANT CHANGE UP (ref 1.8–2.4)
MCHC RBC-ENTMCNC: 25.8 PG — LOW (ref 27–31)
MCHC RBC-ENTMCNC: 32.5 G/DL — SIGNIFICANT CHANGE UP (ref 32–37)
MCV RBC AUTO: 79.2 FL — LOW (ref 81–99)
MONOCYTES # BLD AUTO: 0.72 K/UL — HIGH (ref 0.1–0.6)
MONOCYTES NFR BLD AUTO: 4.9 % — SIGNIFICANT CHANGE UP (ref 1.7–9.3)
NEUTROPHILS # BLD AUTO: 11.37 K/UL — HIGH (ref 1.4–6.5)
NEUTROPHILS NFR BLD AUTO: 77.9 % — HIGH (ref 42.2–75.2)
NRBC # BLD: 0 /100 WBCS — SIGNIFICANT CHANGE UP (ref 0–0)
PHOSPHATE SERPL-MCNC: 2.7 MG/DL — SIGNIFICANT CHANGE UP (ref 2.1–4.9)
PLATELET # BLD AUTO: 420 K/UL — HIGH (ref 130–400)
POTASSIUM SERPL-MCNC: 4.3 MMOL/L — SIGNIFICANT CHANGE UP (ref 3.5–5)
POTASSIUM SERPL-SCNC: 4.3 MMOL/L — SIGNIFICANT CHANGE UP (ref 3.5–5)
PROT SERPL-MCNC: 6.5 G/DL — SIGNIFICANT CHANGE UP (ref 6–8)
RBC # BLD: 4.77 M/UL — SIGNIFICANT CHANGE UP (ref 4.2–5.4)
RBC # FLD: 13.5 % — SIGNIFICANT CHANGE UP (ref 11.5–14.5)
SODIUM SERPL-SCNC: 137 MMOL/L — SIGNIFICANT CHANGE UP (ref 135–146)
VWF AG ACT/NOR PPP IA: 160 % — SIGNIFICANT CHANGE UP (ref 63–170)
WBC # BLD: 14.6 K/UL — HIGH (ref 4.8–10.8)
WBC # FLD AUTO: 14.6 K/UL — HIGH (ref 4.8–10.8)

## 2022-04-26 PROCEDURE — 70496 CT ANGIOGRAPHY HEAD: CPT | Mod: 26

## 2022-04-26 PROCEDURE — 99291 CRITICAL CARE FIRST HOUR: CPT

## 2022-04-26 PROCEDURE — 99233 SBSQ HOSP IP/OBS HIGH 50: CPT

## 2022-04-26 RX ORDER — ACETAMINOPHEN 500 MG
650 TABLET ORAL EVERY 6 HOURS
Refills: 0 | Status: DISCONTINUED | OUTPATIENT
Start: 2022-04-26 | End: 2022-04-30

## 2022-04-26 RX ADMIN — HYDROMORPHONE HYDROCHLORIDE 0.25 MILLIGRAM(S): 2 INJECTION INTRAMUSCULAR; INTRAVENOUS; SUBCUTANEOUS at 12:51

## 2022-04-26 RX ADMIN — OXYCODONE HYDROCHLORIDE 5 MILLIGRAM(S): 5 TABLET ORAL at 05:05

## 2022-04-26 RX ADMIN — HEPARIN SODIUM 5000 UNIT(S): 5000 INJECTION INTRAVENOUS; SUBCUTANEOUS at 21:20

## 2022-04-26 RX ADMIN — HEPARIN SODIUM 5000 UNIT(S): 5000 INJECTION INTRAVENOUS; SUBCUTANEOUS at 06:50

## 2022-04-26 RX ADMIN — Medication 10 MILLIGRAM(S): at 01:39

## 2022-04-26 RX ADMIN — OXYCODONE HYDROCHLORIDE 5 MILLIGRAM(S): 5 TABLET ORAL at 04:35

## 2022-04-26 RX ADMIN — HEPARIN SODIUM 5000 UNIT(S): 5000 INJECTION INTRAVENOUS; SUBCUTANEOUS at 13:05

## 2022-04-26 RX ADMIN — Medication 10 MILLIGRAM(S): at 12:01

## 2022-04-26 RX ADMIN — HYDROMORPHONE HYDROCHLORIDE 0.25 MILLIGRAM(S): 2 INJECTION INTRAMUSCULAR; INTRAVENOUS; SUBCUTANEOUS at 12:01

## 2022-04-26 NOTE — PROGRESS NOTE ADULT - ASSESSMENT
31F with pmhx of migraines transferred from OSH for mgmt of acute R occipital intraparenchymal hemorrhage. Physical exam with left visual field deficit. Vital signs within normal  limits. GCS 15, ICH 0.     NEURO:  # Acute R Occipital Hematoma  CTH - interval stability imaging on 4/24 (compared to read from [facility] in AM on 4/23)- stable IPH  CTA/MR w/w/o at [facility] in chart- concern for possible venous anomaly   Neuro-IR consultation for DSA  CTV brain  q4h neurochecks   tylenol/oxycodone/dilaudid PRN  Activity: [x] OOB as tolerated [] Bedrest [x] PT [x] OT [] PMNR    PULM:  Maintain SpO2 >92%    CV:  SBP <140    RENAL:  Normosol while NPO  voiding freely   goal normonatremia    GI:  NPO for possible DSA today  anti-emetics  GI prophylaxis [X] not indicated [] PPI [] other:    ENDO:   serum glucose normal     HEME/ONC:  VTE prophylaxis: SCDs, SQH  Family Hx vWD and APL  coags wnl  f/u vwf activity, antigen,     ID:  likely stress induced leukocytosis  no sequelae of infection    MISC:    CODE STATUS:  [x] Full Code [] DNR [] DNI [] Palliative/Comfort Care    DISPOSITION:  [X] ICU [] Stroke Unit [] Floor [] CEU 31F with pmhx of migraines transferred from OSH for mgmt of acute R occipital intraparenchymal hemorrhage. Physical exam with left visual field deficit. Vital signs within normal  limits. GCS 15, ICH 0.     NEURO:  # Acute R Occipital Hematoma  CTH - interval stability imaging on 4/24 (compared to read from [facility] in AM on 4/23)- stable IPH  CTA/MR w/w/o at [facility] in chart- concern for possible venous anomaly   Neuro-IR consultation for DSA  CTV brain negative  MRI/MRA  q4h neurochecks   tylenol/oxycodone/dilaudid PRN  Activity: [x] OOB as tolerated [] Bedrest [x] PT [x] OT [] PMNR    PULM:  Maintain SpO2 >92%    CV:  SBP <140    RENAL:  Normosol while NPO  voiding freely   goal normonatremia    GI:  Regular diet  anti-emetics  GI prophylaxis [X] not indicated [] PPI [] other:    ENDO:   serum glucose normal     HEME/ONC:  VTE prophylaxis: SCDs, SQH  Family Hx vWD and APL  coags wnl  f/u vwf activity, antigen, PHANI, ANCA  Autoimmune panel ordered    ID:  likely stress induced leukocytosis  no sequelae of infection    MISC:    CODE STATUS:  [x] Full Code [] DNR [] DNI [] Palliative/Comfort Care    DISPOSITION:  [] ICU [x] Stroke Unit [] Floor [] CEU

## 2022-04-26 NOTE — PROGRESS NOTE ADULT - CRITICAL CARE ATTENDING COMMENT
Problems  Right occipital spontaneous ICH: work up with catheter based angio, pending  Retroorbital pain: history of migrains avoid tryptans, optimize anagesia with opiates  HTN: BP goals 110-140 mmHg      Rest of assessment and systemic plan of care outlined as above was reviewed and approved.
Problems  Right occipital spontaneous ICH: CTA/V -ve work up with MRI, catheter based angio & collagen vascular w up pending  Retroorbital pain: history of migrains avoid tryptans, optimize anagesia with opiates  HTN: BP goals 110-140 mmHg      Rest of assessment and systemic plan of care outlined as above was reviewed and approved.

## 2022-04-26 NOTE — CONSULT NOTE ADULT - ASSESSMENT
IMPRESSION: Rehab of R IPH    PRECAUTIONS: [   ] Cardiac  [   ] Respiratory  [   ] Seizures [   ] Contact Isolation  [   ] Droplet Isolation  [   ] Other    Weight Bearing Status:     RECOMMENDATION:    Out of Bed to Chair     DVT/Decubiti Prophylaxis    REHAB PLAN:     [ x   ] Bedside P/T 3-5 times a week   [x    ]   Bedside O/T  2-3 times a week             [    ] Speech Therapy               [    ]  No Rehab Therapy Indicated   Conditioning/ROM                                    ADL  Bed Mobility                                               Conditioning/ROM  Transfers                                                     Bed Mobility  Sitting /Standing Balance                         Transfers                                        Gait Training                                               Sitting/Standing Balance  Stair Training [   ]Applicable                    Home equipment Eval                                                                        Splinting  [   ] Only      GOALS:   ADL   [  x  ]   Independent                    Transfers  [ x   ] Independent                          Ambulation  [  x  ] Independent     [  x   ] With device                            [    ]  CG                                                         [    ]  CG                                                                  [    ] CG                            [    ] Min A                                                   [    ] Min A                                                              [    ] Min  A          DISCHARGE PLAN:   [    ]  Good candidate for Intensive Rehabilitation/Hospital based                                             Will tolerate 3hrs Intensive Rehab Daily                                       [     ]  Short Term Rehab in Skilled Nursing Facility                                       [ x    ]  Home with Outpatient or VN services                                         [     ]  Possible Candidate for Intensive Hospital based Rehab

## 2022-04-26 NOTE — PROGRESS NOTE ADULT - SUBJECTIVE AND OBJECTIVE BOX
SUMMARY: 31F with a past medical history of migraines on Sumitriptan PRN, pre-eclampsia requiring induction/, presented to [facility] @ 4am on  complaining of headache and visual disturbances. States it was sharp, worse than her usual migraines. CTA head revealed 3.2 x 1.7 cm acute R occipital intraparenchymal hemorrhage. MR Brain w/wo contrast in afternoon with stable hematoma, ?venous angioma. She was started on Keppra. Normotensive upon arrival. Patient transferred to [facility] via EMS per family request. Upon arrival to NSICU, vital signs within normal limits, neuro exam with L visual field deficit, consistent with sign out per [facility], otherwise no focal deficits. ICH=0, GCS 15. She complains of headache and "unable to focus her eyesight" and intermittent dizziness. Patient denies fever, drug use, recent travel, numbness, tingling, weakness, chest pain, SOB, abdominal pain, N/V, diarrhea, dysuria.    Of note, patient has two sisters with Hemophilia A.     OVERNIGHT EVENTS: Neurological stable ovn. CTV show no evidence of Central venous Trombosis of the sinuses..    ADMISSION SCORES:   ICH Score: 0    REVIEW OF SYSTEMS: Pt endorses HA, otherwise ROS negative    VITALS: [x] Reviewed    IMAGING/DATA: [x] Reviewed    IV FLUIDS/MEDICATIONS: [x] Reviewed    ALLERGIES: Allergies    No Known Allergies    Intolerances      EXAMINATION:  General: No acute distress  HEENT: Anicteric sclerae  Cardiac: [id number]  Lungs: Clear  Abdomen: Soft, non-tender, +BS  Extremities: No c/c/e  Skin/Incision Site: Clean, dry and intact  Neurologic: AAOx4, following commands, L visual field deficit, PERRL, EOMI, tongue midline, finger to nose intact, 5/5 strength in b/l UE's and LE's, sensation intact to touch in all extremities, no pronator drift          ICU Vital Signs Last 24 Hrs  T(C): 36.2 (2022 20:00), Max: 36.2 (2022 20:00)  T(F): 97.2 (2022 20:00), Max: 97.2 (2022 20:00)  HR: 86 (2022 06:00) (68 - 96)  BP: 129/74 (2022 06:00) (97/55 - 137/77)  BP(mean): 91 (2022 06:00) (66 - 117)  ABP: --  ABP(mean): --  RR: 12 (2022 06:00) (9 - 37)  SpO2: 96% (2022 06:00) (91% - 99%)      22 @ 07:01  -  22 @ 07:00  --------------------------------------------------------  IN: 600 mL / OUT: 0 mL / NET: 600 mL            artificial  tears Solution 1 Drop(s) Both EYES every 4 hours PRN  chlorhexidine 4% Liquid 1 Application(s) Topical every 12 hours  heparin   Injectable 5000 Unit(s) SubCutaneous every 8 hours  HYDROmorphone  Injectable 0.25 milliGRAM(s) IV Push every 4 hours PRN  multiple electrolytes Injection Type 1 1000 milliLiter(s) (75 mL/Hr) IV Continuous <Continuous>  ondansetron Injectable 4 milliGRAM(s) IV Push every 6 hours PRN  oxyCODONE    IR 5 milliGRAM(s) Oral every 6 hours PRN      LABS:  Na: 136 ( @ 05:30), 135 ( @ 01:50)  K: 4.6 ( 05:30), 3.7 ( 01:50)  Cl: 100 ( 05:30), 101 ( 01:50)  CO2: 21 ( 05:30), 23 ( 01:50)  BUN: 12 ( 05:30), 14 ( 01:50)  Cr: 0.7 ( 05:30), 0.7 ( 01:50)  Glu: 111( 05:30), 97( @ 01:50)    Hgb: 12.6 ( 05:30), 12.8 ( 01:50)  Hct: 37.2 ( @ 05:30), 38.3 (:50)  WBC: 17.67 ( @ 05:30), 12.46 ( @ :50)  Plt: 441 ( @ 05:30), 432 (:50)    INR: 1.15 22 @ 05:30, 1.11 22 @ 01:50  PTT: 29.7 22 @ 05:30, 30.4 22 @ 01:50          LIVER FUNCTIONS - ( 2022 05:30 )  Alb: 4.1 g/dL / Pro: 6.7 g/dL / ALK PHOS: 108 U/L / ALT: 31 U/L / AST: 21 U/L / GGT: x                SUMMARY: 31F with a past medical history of migraines on Sumitriptan PRN, pre-eclampsia requiring induction/, presented to [facility] @ 4am on  complaining of headache and visual disturbances. States it was sharp, worse than her usual migraines. CTA head revealed 3.2 x 1.7 cm acute R occipital intraparenchymal hemorrhage. MR Brain w/wo contrast in afternoon with stable hematoma, ?venous angioma. She was started on Keppra. Normotensive upon arrival. Patient transferred to [facility] via EMS per family request. Upon arrival to NSICU, vital signs within normal limits, neuro exam with L visual field deficit, consistent with sign out per [facility], otherwise no focal deficits. ICH=0, GCS 15. She complains of headache and "unable to focus her eyesight" and intermittent dizziness. Patient denies fever, drug use, recent travel, numbness, tingling, weakness, chest pain, SOB, abdominal pain, N/V, diarrhea, dysuria.    Of note, patient has two sisters with Hemophilia A.     OVERNIGHT EVENTS: Neurological stable ovn. CTV show no evidence of Central venous Trombosis of the sinuses..    ADMISSION SCORES:   ICH Score: 0    REVIEW OF SYSTEMS: Pt endorses HA, otherwise ROS negative    VITALS: [x] Reviewed    IMAGING/DATA: [x] Reviewed    IV FLUIDS/MEDICATIONS: [x] Reviewed    ALLERGIES: Allergies    No Known Allergies    Intolerances      EXAMINATION:  General: No acute distress  HEENT: Anicteric sclerae  Cardiac: [id number]  Lungs: Clear  Abdomen: Soft, non-tender, +BS  Extremities: No c/c/e  Skin/Incision Site: Clean, dry and intact  Neurologic: AAOx4, following commands, L visual field deficit, PERRL, EOMI, tongue midline, finger to nose intact, 5/5 strength in b/l UE's and LE's, sensation intact to touch in all extremities, no pronator drift        ICU Vital Signs Last 24 Hrs  T(C): 36.6 (2022 08:00), Max: 36.6 (2022 08:00)  T(F): 97.9 (2022 08:00), Max: 97.9 (2022 08:00)  HR: 78 (2022 08:00) (66 - 100)  BP: 128/72 (2022 06:00) (109/65 - 149/74)  BP(mean): 84 (2022 06:00) (78 - 108)  ABP: --  ABP(mean): --  RR: 21 (2022 08:00) (12 - 31)  SpO2: 98% (2022 08:00) (92% - 98%)      22 @ 07:01  -  22 @ 07:00  --------------------------------------------------------  IN: 675 mL / OUT: 4 mL / NET: 671 mL    22 @ 07:01  -  22 @ 08:31  --------------------------------------------------------  IN: 0 mL / OUT: 1 mL / NET: -1 mL            artificial  tears Solution 1 Drop(s) Both EYES every 4 hours PRN  chlorhexidine 4% Liquid 1 Application(s) Topical every 12 hours  heparin   Injectable 5000 Unit(s) SubCutaneous every 8 hours  HYDROmorphone  Injectable 0.25 milliGRAM(s) IV Push every 4 hours PRN  metoclopramide Injectable 10 milliGRAM(s) IV Push every 6 hours PRN  oxyCODONE    IR 5 milliGRAM(s) Oral every 6 hours PRN      LABS:  Na: 137 ( @ 04:30), 139 ( @ 17:45), 136 ( @ 05:30), 135 ( @ 01:50)  K: 4.3 ( @ 04:30), 3.9 ( @ 17:45), 4.6 ( @ 05:30), 3.7 ( @ 01:50)  Cl: 101 ( @ 04:30), 103 ( @ 17:45), 100 ( @ 05:30), 101 (:50)  CO2: 25 ( 04:30), 26 ( @ 17:45), 21 ( 05:30), 23 (:50)  BUN: 9 ( 04:30), 11 ( @ 17:45), 12 ( 05:30), 14 (:50)  Cr: 0.7 ( 04:30), 0.6 ( @ 17:45), 0.7 ( 05:30), 0.7 (:50)  Glu: 100( 04:30), 137( 17:45), 111( 05:30), 97(:50)    Hgb: 12.3 ( 04:30), 12.6 ( 05:30), 12.8 (:50)  Hct: 37.8 ( 04:30), 37.2 ( 05:30), 38.3 (:50)  WBC: 14.60 ( 04:30), 17.67 ( 05:30), 12.46 (:50)  Plt: 420 ( 04:30), 441 ( 05:30), 432 (:50)    INR: 1.15 22 @ 05:30, 1.11 22 @ 01:50  PTT: 29.7 22 @ 05:30, 30.4 22 @ 01:50          LIVER FUNCTIONS - ( 2022 04:30 )  Alb: 4.1 g/dL / Pro: 6.5 g/dL / ALK PHOS: 118 U/L / ALT: 40 U/L / AST: 32 U/L / GGT: x

## 2022-04-26 NOTE — CHART NOTE - NSCHARTNOTEFT_GEN_A_CORE
NCCU Transfer Note    Transfer from: NCCU    Transfer to: (  ) Medicine    (  ) Telemetry    (  ) RCU  ( ) SDU                               (  ) Palliative    (x) Stroke Unit    (  ) MICU    (  ) __________________    Accepting Physician: Tahddeus    Signout given to: John #[id number]    HPI / CCU COURSE:    31F with a past medical history of migraines on Sumitriptan PRN, pre-eclampsia requiring induction/, Family history of Hemophilia A, presented to [facility] @ 4am on  complaining of headache and visual disturbances. States it was sharp, worse than her usual migraines. CTA head revealed 3.2 x 1.7 cm acute R occipital intraparenchymal hemorrhage. MR Brain w/wo contrast in afternoon with stable hematoma, ?venous angioma. She was started on Keppra. Normotensive upon arrival. Patient transferred to [facility] via EMS per family request. Upon arrival to NSICU, vital signs within normal limits, neuro exam with L visual field deficit, consistent with sign out per [facility], otherwise no focal deficits. ICH=0, GCS 15. She complains of headache and "unable to focus her eyesight" and intermittent dizziness. Patient denies fever, drug use, recent travel, numbness, tingling, weakness, chest pain, SOB, abdominal pain, N/V, diarrhea, dysuria. Patient transferred to NCCU for further IPH workup    : No acute events overnight, coagulability w/u pending, Neuro IR consulted    : No acute events overnight, CTV - for venous thrombosis       Vital Signs Last 24 Hrs  T(C): 36.6 (2022 08:00), Max: 36.6 (2022 08:00)  T(F): 97.9 (2022 08:00), Max: 97.9 (2022 08:00)  HR: 92 (2022 12:00) (66 - 100)  BP: 133/86 (2022 08:00) (109/65 - 148/74)  BP(mean): 108 (2022 08:00) (78 - 108)  RR: 13 (2022 12:00) (12 - 31)  SpO2: 96% (2022 12:00) (92% - 98%)    I&O's Summary    2022 07:  -  2022 07:00  --------------------------------------------------------  IN: 675 mL / OUT: 4 mL / NET: 671 mL    2022 07:01  -  2022 12:51  --------------------------------------------------------  IN: 0 mL / OUT: 2 mL / NET: -2 mL        EXAMINATION:  General: No acute distress  HEENT: Anicteric sclerae  Cardiac: [id number]  Lungs: Clear  Abdomen: Soft, non-tender, +BS  Extremities: No c/c/e  Skin/Incision Site: Clean, dry and intact  Neurologic: AAOx4, following commands, L visual field deficit, PERRL, EOMI, tongue midline, finger to nose intact, 5/5 strength in b/l UE's and LE's, sensation intact to touch in all extremities, no pronator drift      LABS:                               12.3   14.60 )-----------( 420      ( 2022 04:30 )             37.8       -26    137  |  101  |  9<L>  ----------------------------<  100<H>  4.3   |  25  |  0.7    Ca    9.0      2022 04:30  Phos  2.7       Mg     2.0         TPro  6.5  /  Alb  4.1  /  TBili  0.3  /  DBili  x   /  AST  32  /  ALT  40  /  AlkPhos  118<H>  26      PT/INR - ( 2022 05:30 )   PT: 13.20 sec;   INR: 1.15 ratio         PTT - ( 2022 05:30 )  PTT:29.7 sec        Imaging:      < from: CT Head No Cont (22 @ 08:38) >      ACC: [id number] EXAM:  CT BRAIN                          PROCEDURE DATE:  2022      < end of copied text >    < from: CT Head No Cont (22 @ 08:38) >    IMPRESSION:    Right parieto-occipital intraparenchymal hematoma measuring approximately   2.8 cm with 2 mm right to left midline shift and relative sulcal   effacement in the right apex compared to left. Per notes, previous   outside imaging revealed 3.2 x 1.7 cm right occipital intraparenchymal   hemorrhage. A contrast CT scan or MRI study is suggested to further rule   out the possibility of underlying mass formation.    < end of copied text >    < from: CT Venogram Brain w/ IV Cont (22 @ 01:06) >    ACC: [id number] EXAM:  CT VENOGRAM BRAIN (W)AW IC                          PROCEDURE DATE:  2022     < end of copied text >    < from: CT Venogram Brain w/ IV Cont (22 @ 01:06) >    IMPRESSION:    No evidence of venous sinus thrombosis.    < end of copied text >        ASSESSMENT & PLAN:   31F with pmhx of migraines transferred from OSH for mgmt of acute R occipital intraparenchymal hemorrhage. Physical exam with left visual field deficit. Vital signs within normal  limits. GCS 15, ICH 0.    #RightOccipitalHematoma  - NC Q4H  - CT head from  stable  - CTV negative for venous thrombosis  - MRI/MRA  - PRN Analgesia  - PT/OT  - SBP < 140  - SQH    #Migraines  - PRN Analgesia  - Euvolemia    #R/OHemophilia   - F/U [employer] panel        FOR FOLLOW UP:  [ ] Autoimmune/coagulable panel  [ ] MR/MRA  [ ] Pt/OT    Dirk Ram NP

## 2022-04-26 NOTE — PROGRESS NOTE ADULT - SUBJECTIVE AND OBJECTIVE BOX
Neuroendovascular Consult Note:     This is a 31-year-old female with a past medical history of migraines on Sumatriptan PRN and pre-eclampsia, who presented to [facility] 4/23 with headache and visual disturbances. She describes the headache as sharp, "like being stabbed,"  sudden onset, and unlike her prior migraines. She took one dose of Sumatriptan the day of admission. A 2.8 x 1.4 x 2.8 cm intraparenchymal hematoma was noted on CT head in the right parieto-occipital region with a 2mm r-l midline shift and surrounding edema. A hematoma vs. venous angioma was reportedly noted on MR Brain w/without contrast at [facility].     The neuroendovascular service was consulted for a possible angiogram to rule out a vascular abnormality which might explain the source of bleed.  On exam today the patient is unchanged from yesterday. She is still complaining of a right-sided headache, decreased from admission, and vision changes, particularly involving the left eye. No acute venous sinus thrombosis was reported on CT Venogram 4/26.    PMHx:  Bicornuate uterus  PCOS  Migraine headache    24-Hour Events: None    Medication:  chlorhexidine 4% Liquid 1 Application(s) Topical every 12 hours  heparin   Injectable 5000 Unit(s) SubCutaneous every 8 hours    No Known Allergies    Recent Vitals:  T(F): 97.9 (04-26-22 @ 08:00), Max: 97.9 (04-26-22 @ 08:00)  HR: 92 (04-26-22 @ 12:00) (66 - 100)  BP: 133/86 (04-26-22 @ 08:00) (109/65 - 148/74)  RR: 13 (04-26-22 @ 12:00) (12 - 31)  SpO2: 96% (04-26-22 @ 12:00) (92% - 98%)    Recent Labs:                        12.3   14.60 )-----------( 420      ( 26 Apr 2022 04:30 )             37.8     04-26    137  |  101  |  9<L>  ----------------------------<  100<H>  4.3   |  25  |  0.7    Ca    9.0      26 Apr 2022 04:30  Phos  2.7     04-26  Mg     2.0     04-26    TPro  6.5  /  Alb  4.1  /  TBili  0.3  /  DBili  x   /  AST  32  /  ALT  40  /  AlkPhos  118<H>  04-26    PT/INR - ( 25 Apr 2022 05:30 )   PT: 13.20 sec;   INR: 1.15 ratio         PTT - ( 25 Apr 2022 05:30 )  PTT:29.7 sec    LIVER FUNCTIONS - ( 26 Apr 2022 04:30 )  Alb: 4.1 g/dL / Pro: 6.5 g/dL / ALK PHOS: 118 U/L / ALT: 40 U/L / AST: 32 U/L / GGT: x           Exam:  General: No acute distress, complaining of a right-sided, "dull" headache, improved from admission.  Mental Status: AAO x3; recent and remote memory intact; naming, repetition, and comprehension intact; attention/ concentration intact; no dysarthria or aphasia.  CN: PERRL, +left lower quadrant visual field deficit, no nystagmus, EOMI, V1-V3 intact b/l and symmetric, face symmetric, tongue midline, palate elevation symmetric.   Motor: 5/5 b/l UE/LE,  strength 5/5. Normal bulk and tone. No abnormal movements.   Sensation: Intact to light touch throughout.  Coordination: No dysmetria on finger-to-nose and heel-to-shin.   NIHSS: 1    Recent Radiology:     ACC: [id number] EXAM: CT VENOGRAM BRAIN (W)AW IC    PROCEDURE DATE: 04/26/2022    IMPRESSION:    No evidence of venous sinus thrombosis.      ACC: [id number] EXAM: CT BRAIN    PROCEDURE DATE: 04/24/2022    IMPRESSION:    Right parieto-occipital intraparenchymal hematoma measuring approximately 2.8 cm with 2 mm right to left midline shift and relative sulcal effacement in the right apex compared to left. Per notes, previous outside imaging revealed 3.2 x 1.7 cm right occipital intraparenchymal hemorrhage. A contrast CT scan or MRI study is suggested to further rule out the possibility of underlying mass formation.    Assessment:   31-year-old female with a past medical history of migraines and pre-eclampsia, who presented with a sharp, 10/10 headache and visual disturbances. Intraparenchymal hematoma was noted on CT head in the right parieto-occipital region, with a 2mm r-l midline shift and surrounding edema. A hematoma vs. venous angioma was reportedly noted on MR Brain w/without contrast at [facility]. CT Venogram 4/26 negative for venous sinus thrombosis. Patient states she has PCOS, formerly treated with metformin. Patient also has an extensive family history in first degree relatives of hematological/rheumatological disease, including RA, APS, SLE, and hemophilia A.    Suggestions:   - Will f/u results of MRI w/without contrast and MRA.   - Diagnostic cerebral angiogram tentatively scheduled for Thursday pending MR results.  - We suggest a vasculitis workup, including PHANI, ESR, high sensitivity CRP, PANCA, and CANCA.   - Management per Neuro ICU.   Neuroendovascular Consult Note:     This is a 31-year-old female with a past medical history of migraines on Sumatriptan PRN and pre-eclampsia, who presented to [facility] 4/23 with headache and visual disturbances. She describes the headache as sharp, "like being stabbed,"  sudden onset, and unlike her prior migraines. She took one dose of Sumatriptan the day of admission. A 2.8 x 1.4 x 2.8 cm intraparenchymal hematoma was noted on CT head in the right parieto-occipital region with a 2mm r-l midline shift and surrounding edema. A hematoma vs. venous angioma was reportedly noted on MR Brain w/without contrast at [facility].     The neuroendovascular service was consulted for a possible angiogram to rule out a vascular abnormality which might explain the source of bleed.  On exam today the patient is reporting improvement of visual symptoms and that her headache subsided. No acute venous sinus thrombosis was reported on CT Venogram 4/26.    PMHx:  Bicornuate uterus  PCOS  Migraine headache    24-Hour Events: None    Medication:  chlorhexidine 4% Liquid 1 Application(s) Topical every 12 hours  heparin   Injectable 5000 Unit(s) SubCutaneous every 8 hours    No Known Allergies    Recent Vitals:  T(F): 97.9 (04-26-22 @ 08:00), Max: 97.9 (04-26-22 @ 08:00)  HR: 92 (04-26-22 @ 12:00) (66 - 100)  BP: 133/86 (04-26-22 @ 08:00) (109/65 - 148/74)  RR: 13 (04-26-22 @ 12:00) (12 - 31)  SpO2: 96% (04-26-22 @ 12:00) (92% - 98%)    Recent Labs:                        12.3   14.60 )-----------( 420      ( 26 Apr 2022 04:30 )             37.8     04-26    137  |  101  |  9<L>  ----------------------------<  100<H>  4.3   |  25  |  0.7    Ca    9.0      26 Apr 2022 04:30  Phos  2.7     04-26  Mg     2.0     04-26    TPro  6.5  /  Alb  4.1  /  TBili  0.3  /  DBili  x   /  AST  32  /  ALT  40  /  AlkPhos  118<H>  04-26    PT/INR - ( 25 Apr 2022 05:30 )   PT: 13.20 sec;   INR: 1.15 ratio         PTT - ( 25 Apr 2022 05:30 )  PTT:29.7 sec    LIVER FUNCTIONS - ( 26 Apr 2022 04:30 )  Alb: 4.1 g/dL / Pro: 6.5 g/dL / ALK PHOS: 118 U/L / ALT: 40 U/L / AST: 32 U/L / GGT: x           Exam:  General: No acute distress, complaining of a right-sided, "dull" headache, improved from admission.  Mental Status: AAO x3; recent and remote memory intact; naming, repetition, and comprehension intact; attention/ concentration intact; no dysarthria or aphasia.  CN: PERRL, +left lower quadrant visual field deficit, no nystagmus, EOMI, V1-V3 intact b/l and symmetric, face symmetric, tongue midline, palate elevation symmetric.   Motor: 5/5 b/l UE/LE,  strength 5/5. Normal bulk and tone. No abnormal movements.   Sensation: Intact to light touch throughout.  Coordination: No dysmetria on finger-to-nose and heel-to-shin.   NIHSS: 1    Recent Radiology:     ACC: [id number] EXAM: CT VENOGRAM BRAIN (W)AW IC    PROCEDURE DATE: 04/26/2022    IMPRESSION:    No evidence of venous sinus thrombosis.      ACC: [id number] EXAM: CT BRAIN    PROCEDURE DATE: 04/24/2022    IMPRESSION:    Right parieto-occipital intraparenchymal hematoma measuring approximately 2.8 cm with 2 mm right to left midline shift and relative sulcal effacement in the right apex compared to left. Per notes, previous outside imaging revealed 3.2 x 1.7 cm right occipital intraparenchymal hemorrhage. A contrast CT scan or MRI study is suggested to further rule out the possibility of underlying mass formation.    Assessment:   31-year-old female with a past medical history of migraines and pre-eclampsia, who presented with a sharp, 10/10 headache and visual disturbances. Intraparenchymal hematoma was noted on CT head in the right parieto-occipital region, with a 2mm r-l midline shift and surrounding edema. A hematoma vs. venous angioma was reportedly noted on MR Brain w/without contrast at [facility]. CT Venogram 4/26 negative for venous sinus thrombosis. Patient states she has PCOS, formerly treated with metformin. Patient also has an extensive family history in first degree relatives of hematological/rheumatological disease, including RA, APS, SLE, and hemophilia A.    Suggestions:   - Will f/u results of MRI w/without contrast, MRV, and MRA.   - Will f/u results of vasculitis workup, including PHANI, ESR, high sensitivity CRP, PANCA, and CANCA.   - Possible diagnostic cerebral angiogram later this week or next week, prior to which anesthesiology consult will be needed.  - Management per stroke team/medicine team.  - [phone]

## 2022-04-27 LAB
ALBUMIN SERPL ELPH-MCNC: 4.2 G/DL — SIGNIFICANT CHANGE UP (ref 3.5–5.2)
ALP SERPL-CCNC: 114 U/L — SIGNIFICANT CHANGE UP (ref 30–115)
ALT FLD-CCNC: 36 U/L — SIGNIFICANT CHANGE UP (ref 0–41)
ANION GAP SERPL CALC-SCNC: 14 MMOL/L — SIGNIFICANT CHANGE UP (ref 7–14)
ANTI-RIBONUCLEAR PROTEIN: <0.2 AI — SIGNIFICANT CHANGE UP
AST SERPL-CCNC: 22 U/L — SIGNIFICANT CHANGE UP (ref 0–41)
BASOPHILS # BLD AUTO: 0.08 K/UL — SIGNIFICANT CHANGE UP (ref 0–0.2)
BASOPHILS NFR BLD AUTO: 0.7 % — SIGNIFICANT CHANGE UP (ref 0–1)
BILIRUB SERPL-MCNC: 0.4 MG/DL — SIGNIFICANT CHANGE UP (ref 0.2–1.2)
BLD GP AB SCN SERPL QL: SIGNIFICANT CHANGE UP
BUN SERPL-MCNC: 14 MG/DL — SIGNIFICANT CHANGE UP (ref 10–20)
CALCIUM SERPL-MCNC: 9.2 MG/DL — SIGNIFICANT CHANGE UP (ref 8.5–10.1)
CHLORIDE SERPL-SCNC: 102 MMOL/L — SIGNIFICANT CHANGE UP (ref 98–110)
CO2 SERPL-SCNC: 24 MMOL/L — SIGNIFICANT CHANGE UP (ref 17–32)
CREAT SERPL-MCNC: 0.7 MG/DL — SIGNIFICANT CHANGE UP (ref 0.7–1.5)
D DIMER BLD IA.RAPID-MCNC: <150 NG/ML DDU — SIGNIFICANT CHANGE UP (ref 0–230)
DRVVT SCREEN TO CONFIRM RATIO: SIGNIFICANT CHANGE UP
DSDNA AB FLD-ACNC: <0.2 AI — SIGNIFICANT CHANGE UP
EGFR: 117 ML/MIN/1.73M2 — SIGNIFICANT CHANGE UP
ENA SCL70 AB SER-ACNC: <0.2 AI — SIGNIFICANT CHANGE UP
ENA SM AB FLD QL: <0.2 AI — SIGNIFICANT CHANGE UP
ENA SS-A AB FLD IA-ACNC: <0.2 AI — SIGNIFICANT CHANGE UP
EOSINOPHIL # BLD AUTO: 0.21 K/UL — SIGNIFICANT CHANGE UP (ref 0–0.7)
EOSINOPHIL NFR BLD AUTO: 1.8 % — SIGNIFICANT CHANGE UP (ref 0–8)
GLUCOSE SERPL-MCNC: 82 MG/DL — SIGNIFICANT CHANGE UP (ref 70–99)
HCT VFR BLD CALC: 38.1 % — SIGNIFICANT CHANGE UP (ref 37–47)
HGB BLD-MCNC: 12.5 G/DL — SIGNIFICANT CHANGE UP (ref 12–16)
IMM GRANULOCYTES NFR BLD AUTO: 0.3 % — SIGNIFICANT CHANGE UP (ref 0.1–0.3)
LA NT DPL PPP QL: SIGNIFICANT CHANGE UP
LYMPHOCYTES # BLD AUTO: 25.3 % — SIGNIFICANT CHANGE UP (ref 20.5–51.1)
LYMPHOCYTES # BLD AUTO: 3 K/UL — SIGNIFICANT CHANGE UP (ref 1.2–3.4)
MAGNESIUM SERPL-MCNC: 2 MG/DL — SIGNIFICANT CHANGE UP (ref 1.8–2.4)
MCHC RBC-ENTMCNC: 25.8 PG — LOW (ref 27–31)
MCHC RBC-ENTMCNC: 32.8 G/DL — SIGNIFICANT CHANGE UP (ref 32–37)
MCV RBC AUTO: 78.7 FL — LOW (ref 81–99)
MONOCYTES # BLD AUTO: 0.73 K/UL — HIGH (ref 0.1–0.6)
MONOCYTES NFR BLD AUTO: 6.1 % — SIGNIFICANT CHANGE UP (ref 1.7–9.3)
NEUTROPHILS # BLD AUTO: 7.82 K/UL — HIGH (ref 1.4–6.5)
NEUTROPHILS NFR BLD AUTO: 65.8 % — SIGNIFICANT CHANGE UP (ref 42.2–75.2)
NORMALIZED SCT PPP-RTO: 1.07 RATIO — SIGNIFICANT CHANGE UP (ref 0–1.16)
NORMALIZED SCT PPP-RTO: SIGNIFICANT CHANGE UP
NRBC # BLD: 0 /100 WBCS — SIGNIFICANT CHANGE UP (ref 0–0)
PHOSPHATE SERPL-MCNC: 3.9 MG/DL — SIGNIFICANT CHANGE UP (ref 2.1–4.9)
PLATELET # BLD AUTO: 438 K/UL — HIGH (ref 130–400)
POTASSIUM SERPL-MCNC: 4 MMOL/L — SIGNIFICANT CHANGE UP (ref 3.5–5)
POTASSIUM SERPL-SCNC: 4 MMOL/L — SIGNIFICANT CHANGE UP (ref 3.5–5)
PROT SERPL-MCNC: 6.6 G/DL — SIGNIFICANT CHANGE UP (ref 6–8)
RBC # BLD: 4.84 M/UL — SIGNIFICANT CHANGE UP (ref 4.2–5.4)
RBC # FLD: 13.7 % — SIGNIFICANT CHANGE UP (ref 11.5–14.5)
RHEUMATOID FACT SERPL-ACNC: <10 IU/ML — SIGNIFICANT CHANGE UP (ref 0–13)
SODIUM SERPL-SCNC: 140 MMOL/L — SIGNIFICANT CHANGE UP (ref 135–146)
WBC # BLD: 11.88 K/UL — HIGH (ref 4.8–10.8)
WBC # FLD AUTO: 11.88 K/UL — HIGH (ref 4.8–10.8)

## 2022-04-27 PROCEDURE — 99233 SBSQ HOSP IP/OBS HIGH 50: CPT

## 2022-04-27 PROCEDURE — 70546 MR ANGIOGRAPH HEAD W/O&W/DYE: CPT | Mod: 26,59

## 2022-04-27 PROCEDURE — 70553 MRI BRAIN STEM W/O & W/DYE: CPT | Mod: 26

## 2022-04-27 PROCEDURE — 99223 1ST HOSP IP/OBS HIGH 75: CPT

## 2022-04-27 RX ORDER — SODIUM CHLORIDE 9 MG/ML
1000 INJECTION INTRAMUSCULAR; INTRAVENOUS; SUBCUTANEOUS
Refills: 0 | Status: DISCONTINUED | OUTPATIENT
Start: 2022-04-28 | End: 2022-04-29

## 2022-04-27 RX ADMIN — HEPARIN SODIUM 5000 UNIT(S): 5000 INJECTION INTRAVENOUS; SUBCUTANEOUS at 21:32

## 2022-04-27 RX ADMIN — HEPARIN SODIUM 5000 UNIT(S): 5000 INJECTION INTRAVENOUS; SUBCUTANEOUS at 05:45

## 2022-04-27 RX ADMIN — Medication 0.5 MILLIGRAM(S): at 18:16

## 2022-04-27 RX ADMIN — Medication 650 MILLIGRAM(S): at 00:51

## 2022-04-27 RX ADMIN — HEPARIN SODIUM 5000 UNIT(S): 5000 INJECTION INTRAVENOUS; SUBCUTANEOUS at 14:24

## 2022-04-27 NOTE — PROGRESS NOTE ADULT - SUBJECTIVE AND OBJECTIVE BOX
CHIEF COMPLAINT:  headache    INTERVAL HISTORY:  Pt endorses feeling better. Headache is not as severe. Pt suffers from migraines since age 13. Started to developed visual aura of floaters over the last year. No ppx medication, only uses imitrex 100mg po prn. Takes it sometimes 2-3 times in 24 hours. Last use was 2 days prior to presentation. The headache that brought her in was different in that it was sudden severe sharp pain up the right side of her head, waking her up from sleep, then noted left sided vision difficulty. No prior similar headaches. States that her mother has "vasculitis", desribes symptoms as bruising, unsure what kind. Denies using marijuana, tobacco, or illicit drugs. Not on SSRI's or using OTC nasal decongestant.     REVIEW OF SYSTEMS:  As per HPI, otherwise negative for Constitutional, Eyes, Ears/Nose/Mouth/Throat, Neck, Cardiovascular, Respiratory, Gastrointestinal, Genitourinary, Skin, Endocrine, Musculoskeletal, Psychiatric, and Hematologic/Lymphatic.    MEDICATIONS:  acetaminophen     Tablet .. 650 milliGRAM(s) Oral every 6 hours PRN  artificial  tears Solution 1 Drop(s) Both EYES every 4 hours PRN  chlorhexidine 4% Liquid 1 Application(s) Topical every 12 hours  heparin   Injectable 5000 Unit(s) SubCutaneous every 8 hours  LORazepam   Injectable 0.5 milliGRAM(s) IV Push once  oxyCODONE    IR 5 milliGRAM(s) Oral every 6 hours PRN    VITAL SIGNS:  Vital Signs Last 24 Hrs  T(C): 37.1 (26 Apr 2022 21:09), Max: 37.1 (26 Apr 2022 21:09)  T(F): 98.7 (26 Apr 2022 21:09), Max: 98.7 (26 Apr 2022 21:09)  HR: 94 (27 Apr 2022 11:16) (69 - 102)  BP: 112/73 (27 Apr 2022 11:16) (107/87 - 131/73)  BP(mean): 89 (27 Apr 2022 11:16) (78 - 100)  RR: 18 (27 Apr 2022 11:16) (18 - 18)  SpO2: 98% (27 Apr 2022 11:16) (94% - 98%)    PHYSICAL EXAMINATION:  General: Well-developed, well nourished, in no acute distress.  Eyes: Conjunctiva and sclera clear.  Cardiovascular: Regular rate and rhythm; S1 and S2 Normal; No murmurs, gallops or rubs.  Neurologic:  - Mental Status:  Alert, awake, oriented to person, place, and time; Speech is fluent with intact naming, repetition, and comprehension;   - Cranial Nerves II-XII:    II:   left lower visual field loss, Pupils are equal, round, and reactive to light.  III, IV, VI:  Extraocular movements are intact without nystagmus.  V:  Facial sensation is intact in the V1-V3 distribution bilaterally.  VII:  Face is symmetric with normal eye closure and smile  VIII:  Hearing is intact to finger rub.  IX, X:  Uvula is midline and soft palate rises symmetrically  XI:  Head turning and shoulder shrug are intact.  XII:  Tongue protrudes in the midline.  - Motor:  Strength is 5/5 throughout.  There is no pronator drift.  Normal muscle bulk and tone throughout.  - Sensory:  Intact to light touch sense throughout.  - Coordination:  Finger-nose-finger intact without dysmetria.  .    LABS:                          12.5   11.88 )-----------( 438      ( 27 Apr 2022 07:20 )             38.1     04-27    140  |  102  |  14  ----------------------------<  82  4.0   |  24  |  0.7    Ca    9.2      27 Apr 2022 07:20  Phos  3.9     04-27  Mg     2.0     04-27    TPro  6.6  /  Alb  4.2  /  TBili  0.4  /  DBili  x   /  AST  22  /  ALT  36  /  AlkPhos  114  04-27          RADIOLOGY & ADDITIONAL STUDIES:      CT head 4/24/22:   IMPRESSION:    Right parieto-occipital intraparenchymal hematoma measuring approximately   2.8 cm with 2 mm right to left midline shift and relative sulcal   effacement in the right apex compared to left. Per notes, previous   outside imaging revealed 3.2 x 1.7 cm right occipital intraparenchymal   hemorrhage. A contrast CT scan or MRI study is suggested to further rule   out the possibility of underlying mass formation.      These findings were discussed with LUIS Erazo at 4/24/2022 2:27 PM by   Dr. Manzano with read back confirmation.    CTV 4/26/22: IMPRESSION:    No evidence of venous sinus thrombosis.

## 2022-04-27 NOTE — CONSULT NOTE ADULT - TIME BILLING
time spent on review of labs, imaging studies, old records, obtaining history, personally examining patient, counselling and communicating with patient/ family, entering orders for medications/tests/etc, discussions with other health care providers, documentation in electronic health records, independent interpretation of labs, imaging/procedure results and care coordination.

## 2022-04-27 NOTE — CONSULT NOTE ADULT - ASSESSMENT
Pt is a 30 yo F with PMhx of PCOS, migraines, who presented with thunderclap headache in a setting of frequent Imitrex use.    # Right occipital parasagittal IPH: . Ddx RCVS, vasculitis, venous thrombosis, vs less likely underlying mass/tumor. ?Hemophilia  - MRI brain, MRA head and MRV head pending  - Possible DSA tomorrow  - tylenol prn for pain  - Avoid NSAIDs, ATP, or other vasoconstrictive therapy  - ESR 68, CRP 17  - Autoimmune labs pending  - May start CCB pending vessel imaging  - contiue DVT ppx  - q4 neurochecks  - tele  - PT/OT/ST  - STAT CT head for any neuro decline  - will order studies for hemophilia due to family history and menorrhagia. f/u results    #H/o Migraines  -no Imitrex (pt aware)  -will need prophylactic medication on d/c    #PCOS  -previously on Metformin  -oupt f/u w/ GYN    #Obesity  -wt loss advised    #H/o pre-eclampsia  -monitor BP    DVT Px w/ Lovenox, SCDs    #Progress Note Handoff  Pending: Consults____Clinical improvement and stability__x___Tests__MRIs, DSA_____  Pt/Family discussion: Pt informed and agrees with the current plan  Disposition: Home____    My note supersedes the residents note should a discrepancy arise.    Chart and notes personally reviewed.  Care Discussed with Consultants/Other Providers/ Housestaff [ x] YES [ ] NO   Radiology, labs, old records personally reviewed.    discussed w/ housestaff, nursing, case management, neuro team

## 2022-04-27 NOTE — PROGRESS NOTE ADULT - ASSESSMENT
IMPRESSION & PLAN:    Pt is a 32 yo F with PMhx of PCOS, migraines, who presented with thunderclap headache.    # Right occipital parasagittal IPH: ICH score 0, NIHSS 1. Ddx RCVS, vasculitis, venous thrombosis, vs less likely underlying mass/tumor.   - MRI brain, MRA head and MRV head pending  - Possible DSA tomorrow  - tylenol prn for pain  - Avoid NSAIDs, ATP, or other vasoconstrictive therapy  - ESR 68, CRP 17  - Autoimmune labs pending  - May start CCB pending vessel imaging  - contiue DVT ppx  - q4 neurochecks  - tele  - PT/OT/ST  - STAT CT head for any neuro decline

## 2022-04-28 ENCOUNTER — TRANSCRIPTION ENCOUNTER (OUTPATIENT)
Age: 32
End: 2022-04-28

## 2022-04-28 ENCOUNTER — APPOINTMENT (OUTPATIENT)
Dept: NEUROLOGY | Facility: HOSPITAL | Age: 32
End: 2022-04-28

## 2022-04-28 LAB
ANA PAT FLD IF-IMP: ABNORMAL
ANA TITR SER: ABNORMAL
AUTO DIFF PNL BLD: NEGATIVE — SIGNIFICANT CHANGE UP
C-ANCA SER-ACNC: NEGATIVE — SIGNIFICANT CHANGE UP
FACT VII ACT/NOR PPP: 85 % — SIGNIFICANT CHANGE UP (ref 50–165)
P-ANCA SER-ACNC: NEGATIVE — SIGNIFICANT CHANGE UP

## 2022-04-28 PROCEDURE — 76937 US GUIDE VASCULAR ACCESS: CPT | Mod: 26

## 2022-04-28 PROCEDURE — 99222 1ST HOSP IP/OBS MODERATE 55: CPT

## 2022-04-28 PROCEDURE — 36226 PLACE CATH VERTEBRAL ART: CPT | Mod: 50

## 2022-04-28 PROCEDURE — 76377 3D RENDER W/INTRP POSTPROCES: CPT | Mod: 26

## 2022-04-28 PROCEDURE — 99231 SBSQ HOSP IP/OBS SF/LOW 25: CPT

## 2022-04-28 PROCEDURE — 99233 SBSQ HOSP IP/OBS HIGH 50: CPT

## 2022-04-28 PROCEDURE — 36224 PLACE CATH CAROTD ART: CPT | Mod: 50

## 2022-04-28 PROCEDURE — 36227 PLACE CATH XTRNL CAROTID: CPT | Mod: 50

## 2022-04-28 RX ADMIN — Medication 650 MILLIGRAM(S): at 21:05

## 2022-04-28 RX ADMIN — CHLORHEXIDINE GLUCONATE 1 APPLICATION(S): 213 SOLUTION TOPICAL at 05:34

## 2022-04-28 RX ADMIN — SODIUM CHLORIDE 50 MILLILITER(S): 9 INJECTION INTRAMUSCULAR; INTRAVENOUS; SUBCUTANEOUS at 00:06

## 2022-04-28 RX ADMIN — HEPARIN SODIUM 5000 UNIT(S): 5000 INJECTION INTRAVENOUS; SUBCUTANEOUS at 21:15

## 2022-04-28 RX ADMIN — Medication 650 MILLIGRAM(S): at 21:30

## 2022-04-28 NOTE — CONSULT NOTE ADULT - SUBJECTIVE AND OBJECTIVE BOX
Neuroendovascular Consult:   Consulted for: IPH, angioma on MRI from [facility], evaluation for possible angiogram    HPI:   This is a 31-year-old female with a past medical history of migraines on Sumatriptan PRN and pre-eclampsia, who presented to [facility] 4/23 with headache and visual disturbances. She describes the headache as sharp, "like being stabbed,"  sudden onset, and unlike her prior migraines. She took one dose of Sumatriptan the day of admission. A 2.8 x 1.4 x 2.8 cm intraparenchymal hematoma was noted on CT head in the right parieto-occipital region with a 2mm r-l midline shift and surrounding edema. A hematoma vs. venous angioma was reportedly noted on MR Brain w/without contrast at [facility].     The neuroendovascular service was consulted for a possible angiogram to rule out a vascular abnormality which might explain the source of bleed.  On exam, the patient is awake and alert, complaining of a headache which has decreased in severity from admission, and reports to have trouble focusing her vision. Relevant imaging was reviewed by Dr. Castro, and the possibility of having a diagnostic cerebral angiogram this week, as well as its associated risks and benefits, were discussed with the patient, who has no questions at this time. The patient reports to have been diagnosed with polycystic ovarian syndrome. The patient's mother notes that she has RA, APS, and Lupus. The patient's sisters reportedly have hemophilia A. The patient reports to have had preeclampsia during pregnancy, but to have had BP otherwise within normal limits.    PAST MEDICAL & SURGICAL HISTORY:  Bicornuate uterus  Migraine headache  H/O unilateral salpingectomy    MEDICATIONS  (STANDING):  chlorhexidine 4% Liquid 1 Application(s) Topical every 12 hours  heparin   Injectable 5000 Unit(s) SubCutaneous every 8 hours    MEDICATIONS  (PRN):  artificial  tears Solution 1 Drop(s) Both EYES every 4 hours PRN Dry Eyes  HYDROmorphone  Injectable 0.25 milliGRAM(s) IV Push every 4 hours PRN Severe Pain (7 - 10)  metoclopramide Injectable 10 milliGRAM(s) IV Push every 6 hours PRN nausea/vomiting/migraines  oxyCODONE    IR 5 milliGRAM(s) Oral every 6 hours PRN Moderate Pain (4 - 6)    Allergies:  No Known Allergies    Social History:   Smoking: Yes [ ]  No [X]   ______pk yrs  ETOH  Yes [ ]  No [X]  Social [ ]  DRUGS:  Yes [ ]  No [X]  if so what______________    FAMILY HISTORY:  Family history of systemic lupus erythematosus (SLE) in mother (Mother)  Family history of hemophilia A (Sibling)  Family history of antiphospholipid syndrome (Mother)    Physical Exam:  Vital Signs Last 24 Hrs  T(C): 36.8 (25 Apr 2022 08:00), Max: 36.8 (25 Apr 2022 08:00)  T(F): 98.2 (25 Apr 2022 08:00), Max: 98.2 (25 Apr 2022 08:00)  HR: 76 (25 Apr 2022 16:00) (66 - 88)  BP: 148/74 (25 Apr 2022 16:00) (107/62 - 149/74)  BP(mean): 99 (25 Apr 2022 16:00) (77 - 108)  RR: 18 (25 Apr 2022 16:00) (9 - 27)  SpO2: 92% (25 Apr 2022 16:00) (91% - 99%)    General: Appearance appropriate for age, no acute distress.  Neuro: AAOx3, no dysarthria, no aphasia, comprehension intact, EOMI, PERRL, +left visual field cut, sensation intact to light touch throughout, b/l UE/LE AG, no dysmetria on FTN.     Labs:                         12.6   17.67 )-----------( 441      ( 25 Apr 2022 05:30 )             37.2     04-25    136  |  100  |  12  ----------------------------<  111<H>  4.6   |  21  |  0.7    Ca    9.1      25 Apr 2022 05:30  Phos  4.4     04-24  Mg     1.9     04-25    TPro  6.7  /  Alb  4.1  /  TBili  0.4  /  DBili  x   /  AST  21  /  ALT  31  /  AlkPhos  108  04-25    PT/INR - ( 25 Apr 2022 05:30 )   PT: 13.20 sec;   INR: 1.15 ratio      PTT - ( 25 Apr 2022 05:30 )  PTT:29.7 sec    Pertinent labs:                      12.6   17.67 )-----------( 441      ( 25 Apr 2022 05:30 )             37.2     04-25    136  |  100  |  12  ----------------------------<  111<H>  4.6   |  21  |  0.7    Ca    9.1      25 Apr 2022 05:30  Phos  4.4     04-24  Mg     1.9     04-25    TPro  6.7  /  Alb  4.1  /  TBili  0.4  /  DBili  x   /  AST  21  /  ALT  31  /  AlkPhos  108  04-25    PT/INR - ( 25 Apr 2022 05:30 )   PT: 13.20 sec;   INR: 1.15 ratio      PTT - ( 25 Apr 2022 05:30 )  PTT:29.7 sec    Radiology & Additional Studies:   Radiology imaging reviewed.     ASSESSMENT/ PLAN:   31-year-old female with a past medical history of migraines and pre-eclampsia, who presented with a sharp, 10/10 headache and visual disturbances. She describes the headache as sharp, "like being stabbed,"  sudden onset, and unlike her prior migraines. She took one dose of Sumatriptan the day of admission. A 2.8 x 1.4 x 2.8 cm intraparenchymal hematoma was noted on CT head in the right parieto-occipital region with a 2mm r-l midline shift and surrounding edema. A hematoma vs. venous angioma was reportedly noted on MR Brain w/without contrast at [facility]. Patient states she has PCOS, formerly treated with metformin. Patient also has an extensive family history in first degree relatives of hematological/rheumatological disease, including RA, APS, SLE, and hemophilia A.    Suggestions:   - MRI w/without contrast and MRA are recommended.   - Diagnostic cerebral angiogram tentatively scheduled for this week (Wednesday or Thursday) pending MR results.  - We suggest a vasculitis workup, including PHANI, ESR, high sensitivity CRP, PANCA, and CANCA.   - Will follow.  - Management per Neuro ICU.    Risks, benefits, and alternatives to treatment discussed. All questions answered with understanding.   Thank you for the courtesy of this consult. Please call CYRIL SMITH [phone] with any further questions.   
Rheumatology consult    Patient is a 31y old  Female who presents with a chief complaint of ICH (2022 09:59)      HPI:  31F with a past medical history of migraines on Sumitriptan PRN, pre-eclampsia requiring induction/, presented to [facility] @ 4am on  complaining of headache and visual disturbances. States it was sharp, worse than her usual migraines. CTA head revealed 3.2 x 1.7 cm acute R occipital intraparenchymal hemorrhage. MR Brain w/wo contrast in afternoon with stable hematoma, ?venous angioma. She was started on Keppra. Normotensive upon arrival. Patient transferred to [facility] via EMS per family request. Upon arrival to NSICU, vital signs within normal limits, neuro exam with L visual field deficit, consistent with sign out per [facility], otherwise no focal deficits. ICH=0, GCS 15. She complains of headache and "unable to focus her eyesight" and intermittent dizziness. Patient denies fever, drug use, recent travel, numbness, tingling, weakness, chest pain, SOB, abdominal pain, N/V, diarrhea, dysuria.    Of note, patient has two sisters with Hemophilia A.    (2022 00:06)    Patient reports developing severe headache unresponsive to sumitriptan and "seeing blank" that prompted ED visit. Prior to this she reports rash on her face, sun causing immediate rash that resolves that day with headache, dry eyes, dry mouth, hair loss, and joint pain involving her low back and legs that's noticed most after sitting for periods of time and going to get up and better when resting. No joint pain in the AM and has 15 minutes of AM stiffness without joint swelling. Joint pain is only intermittently without clear pattern. Also reports muscle aches intermittently. She denies fatigue, fevers, unintended weight loss, night sweats, oral or nasal ulcers, raynaud's, respiratory complaints, paresthesias. Reports having more frequent bowel movements than usual but denies diarrhea, constipation, dysuria, hematuria, foamy urine. Her mother has RA, SLE and problem with her lipids. She has been pregnant 3 times, 1 ectopic, 1 miscarriage, 1 gave birth at 32 weeks and told she had pre-eclampsia.        ROS:  Negative except for:    PAST MEDICAL & SURGICAL HISTORY:  Bicornuate uterus    Migraine headache    H/O unilateral salpingectomy        SOCIAL HISTORY:    FAMILY HISTORY:  Family history of systemic lupus erythematosus (SLE) in mother (Mother)    Family history of hemophilia A (Sibling)    Family history of antiphospholipid syndrome (Mother)        MEDICATIONS  (STANDING):  chlorhexidine 4% Liquid 1 Application(s) Topical every 12 hours  heparin   Injectable 5000 Unit(s) SubCutaneous every 8 hours  sodium chloride 0.9%. 1000 milliLiter(s) (50 mL/Hr) IV Continuous <Continuous>    MEDICATIONS  (PRN):  acetaminophen     Tablet .. 650 milliGRAM(s) Oral every 6 hours PRN Mild Pain (1 - 3)  artificial  tears Solution 1 Drop(s) Both EYES every 4 hours PRN Dry Eyes  oxyCODONE    IR 5 milliGRAM(s) Oral every 6 hours PRN Moderate Pain (4 - 6)      Allergies    No Known Allergies    Intolerances        Vital Signs Last 24 Hrs  T(C): 36.4 (2022 20:27), Max: 36.4 (2022 20:27)  T(F): 97.6 (2022 20:27), Max: 97.6 (2022 20:27)  HR: 94 (2022 10:57) (86 - 94)  BP: 110/59 (2022 10:57) (100/56 - 134/69)  BP(mean): 80 (2022 10:57) (73 - 97)  RR: 18 (2022 10:57) (18 - 18)  SpO2: 96% (2022 10:57) (96% - 97%)    PHYSICAL EXAM  General: adult in NAD  HEENT: clear oropharynx, anicteric sclera, pink conjunctiva. No oral ulceration, sinus abnormalities, and saliva adequate  Neck: supple  CV: normal S1/S2 with no murmur rubs or gallops  Lungs: positive air movement b/l ant lungs,clear to auscultation, no wheezes, no rales  Abdomen: soft non-tender non-distended, no hepatosplenomegaly  Ext: no clubbing cyanosis or edema  Musculoskeletal: No joint tenderness or swollen joints on exam  Skin: Facial erythema over bilateral cheeks and chin not sparing nasolabial fold   Neuro: alert and oriented X 4, no focal deficits      22 @ 07:01  -  22 @ 07:00  --------------------------------------------------------  IN: 300 mL / OUT: 0 mL / NET: 300 mL      LABS:                          12.5   11.88 )-----------( 438      ( 2022 07:20 )             38.1         Mean Cell Volume : 78.7 fL  Mean Cell Hemoglobin : 25.8 pg  Mean Cell Hemoglobin Concentration : 32.8 g/dL  Auto Neutrophil # : 7.82 K/uL  Auto Lymphocyte # : 3.00 K/uL  Auto Monocyte # : 0.73 K/uL  Auto Eosinophil # : 0.21 K/uL  Auto Basophil # : 0.08 K/uL  Auto Neutrophil % : 65.8 %  Auto Lymphocyte % : 25.3 %  Auto Monocyte % : 6.1 %  Auto Eosinophil % : 1.8 %  Auto Basophil % : 0.7 %          140  |  102  |  14  ----------------------------<  82  4.0   |  24  |  0.7    Ca    9.2      2022 07:20  Phos  3.9       Mg     2.0         TPro  6.6  /  Alb  4.2  /  TBili  0.4  /  DBili  x   /  AST  22  /  ALT  36  /  AlkPhos  114                        BLOOD SMEAR INTERPRETATION:       RADIOLOGY & ADDITIONAL STUDIES:      
HPI:  31F with a past medical history of migraines on Sumitriptan PRN, pre-eclampsia requiring induction/, presented to [facility] @ 4am on  complaining of headache and visual disturbances. States it was sharp, worse than her usual migraines. CTA head revealed 3.2 x 1.7 cm acute R occipital intraparenchymal hemorrhage. MR Brain w/wo contrast in afternoon with stable hematoma, ?venous angioma. She was started on Keppra. Normotensive upon arrival. Patient transferred to [facility] via EMS per family request. Upon arrival to NSICU, vital signs within normal limits, neuro exam with L visual field deficit, consistent with sign out per [facility], otherwise no focal deficits. ICH=0, GCS 15. She complains of headache and "unable to focus her eyesight" and intermittent dizziness. Patient denies fever, drug use, recent travel, numbness, tingling, weakness, chest pain, SOB, abdominal pain, N/V, diarrhea, dysuria.    Of note, patient has two sisters with Hemophilia A.   A contrast CT scan or MRI study is suggested to further rule out the possibility of underlying mass formation.  Diagnostic cerebral angiogram tentatively scheduled for Thursday pending MR results.      PAST MEDICAL & SURGICAL HISTORY:  Bicornuate uterus    Migraine headache    H/O unilateral salpingectomy        Hospital Course:    TODAY'S SUBJECTIVE & REVIEW OF SYMPTOMS:     Constitutional WNL   Cardio WNL   Resp WNL   GI WNL  Heme WNL  Endo WNL  Skin WNL  MSK WNL  Neuro headache / visual disturbance   Cognitive WNL  Psych WNL      MEDICATIONS  (STANDING):  chlorhexidine 4% Liquid 1 Application(s) Topical every 12 hours  heparin   Injectable 5000 Unit(s) SubCutaneous every 8 hours    MEDICATIONS  (PRN):  acetaminophen     Tablet .. 650 milliGRAM(s) Oral every 6 hours PRN Mild Pain (1 - 3)  artificial  tears Solution 1 Drop(s) Both EYES every 4 hours PRN Dry Eyes  metoclopramide Injectable 10 milliGRAM(s) IV Push every 6 hours PRN nausea/vomiting/migraines  oxyCODONE    IR 5 milliGRAM(s) Oral every 6 hours PRN Moderate Pain (4 - 6)      FAMILY HISTORY:  Family history of systemic lupus erythematosus (SLE) in mother (Mother)    Family history of hemophilia A (Sibling)    Family history of antiphospholipid syndrome (Mother)        Allergies    No Known Allergies    Intolerances        SOCIAL HISTORY:    [  ] Etoh  [  ] Smoking  [  ] Substance abuse     Home Environment:  [   ] Home Alone  [ x  ] Lives with Family  [   ] Home Health Aid    Dwelling:  [   ] Apartment  [ x  ] Private House  [   ] Adult Home  [   ] Skilled Nursing Facility      [   ] Short Term  [   ] Long Term  [  x ] Stairs       Elevator [   ]    FUNCTIONAL STATUS PTA: (Check all that apply)  Ambulation: [  x  ]Independent    [   ] Dependent     [   ] Non-Ambulatory  Assistive Device: [   ] SA Cane  [   ]  Q Cane  [   ] Walker  [   ]  Wheelchair  ADL : [ x  ] Independent  [    ]  Dependent       Vital Signs Last 24 Hrs  T(C): 36.6 (2022 08:00), Max: 36.6 (2022 08:00)  T(F): 97.9 (2022 08:00), Max: 97.9 (2022 08:00)  HR: 82 (2022 14:05) (66 - 100)  BP: 115/66 (2022 14:05) (109/65 - 133/86)  BP(mean): 108 (2022 08:00) (78 - 108)  RR: 16 (2022 14:05) (13 - 24)  SpO2: 96% (2022 12:00) (92% - 98%)      PHYSICAL EXAM: Awake & Alert  GENERAL: NAD  HEAD:  Normocephalic  CHEST/LUNG: Clear   HEART: S1S2+  ABDOMEN: Soft, Nontender  EXTREMITIES:  no calf tenderness    NERVOUS SYSTEM:  Cranial Nerves 2-12 intact [   ] Abnormal  [   ]  ROM: WFL all extremities [x   ]  Abnormal [   ]  Motor Strength: WFL all extremities  [  x ]  Abnormal [   ]  Sensation: intact to light touch [  x ] Abnormal [   ]    FUNCTIONAL STATUS:  Bed Mobility: Independent [   ]  Supervision [  x ]  Needs Assistance [   ]  N/A [   ]  Transfers: Independent [   ]  Supervision [   ]  Needs Assistance [   ]  N/A [   ]   Ambulation: Independent [   ]  Supervision [   ]  Needs Assistance [   ]  N/A [   ]  ADL: Independent [   ] Requires Assistance [   ] N/A [   ]      LABS:                        12.3   14.60 )-----------( 420      ( 2022 04:30 )             37.8     04-26    137  |  101  |  9<L>  ----------------------------<  100<H>  4.3   |  25  |  0.7    Ca    9.0      2022 04:30  Phos  2.7       Mg     2.0         TPro  6.5  /  Alb  4.1  /  TBili  0.3  /  DBili  x   /  AST  32  /  ALT  40  /  AlkPhos  118<H>      PT/INR - ( 2022 05:30 )   PT: 13.20 sec;   INR: 1.15 ratio         PTT - ( 2022 05:30 )  PTT:29.7 sec      RADIOLOGY & ADDITIONAL STUDIES:  
ISREAL DOWELL  31y  Female    Patient is a 31y old  Female who presents with a chief complaint of ICH (2022 14:37)      HPI:  31F with a past medical history of migraines on Sumitriptan PRN, pre-eclampsia requiring induction/, presented to [facility] @ 4am on  complaining of headache and visual disturbances. States it was sharp, worse than her usual migraines. CTA head revealed 3.2 x 1.7 cm acute R occipital intraparenchymal hemorrhage. MR Brain w/wo contrast in afternoon with stable hematoma, ?venous angioma. She was started on Keppra. Normotensive upon arrival. Patient transferred to [facility] via EMS per family request. Upon arrival to NSICU, vital signs within normal limits, neuro exam with L visual field deficit, consistent with sign out per [facility], otherwise no focal deficits. ICH=0, GCS 15. She complains of headache and "unable to focus her eyesight" and intermittent dizziness. Patient denies fever, drug use, recent travel, numbness, tingling, weakness, chest pain, SOB, abdominal pain, N/V, diarrhea, dysuria. Reports that sometimes uses Imitrex almost every day and sometimes multiple times per day. Not taking any migraine prophylaxis.    Of note, patient has two sisters with Hemophilia A.    (2022 00:06)    S: Patient was examined and seen at bedside. This morning pt is resting comfortably in bed and reports no new issues or overnight events. No complaints, feels better. Still Lt field cut. Pt reports menses sometimes lasting more than 2 wks.  Denies CP, SOB, N/V/D/C/AP, cough, F, chills, dizziness, new focal weakness, new vision changes, dysuria, or urinary symptoms, blood in stool.  ROS: all other systems reviewed and are negative    PAST MEDICAL & SURGICAL HISTORY:  Bicornuate uterus  PCOS  Migraine headache  Obesity  H/O unilateral salpingectomy      SOCIAL HISTORY:  Tobacco: denies  Illicit drugs: denies  Alcohol: social  Family history reviewed. Mother with some type of vasculitis. Sisters w/ Hemophilia A  ALLERGIES: NKDA    MEDICATIONS  (STANDING):  chlorhexidine 4% Liquid 1 Application(s) Topical every 12 hours  heparin   Injectable 5000 Unit(s) SubCutaneous every 8 hours  LORazepam   Injectable 0.5 milliGRAM(s) IV Push once    MEDICATIONS  (PRN):  acetaminophen     Tablet .. 650 milliGRAM(s) Oral every 6 hours PRN Mild Pain (1 - 3)  artificial  tears Solution 1 Drop(s) Both EYES every 4 hours PRN Dry Eyes  oxyCODONE    IR 5 milliGRAM(s) Oral every 6 hours PRN Moderate Pain (4 - 6)    Home Medications:          Vital Signs Last 24 Hrs  T(C): 37.1 (2022 21:09), Max: 37.1 (2022 21:09)  T(F): 98.7 (2022 21:09), Max: 98.7 (2022 21:09)  HR: 101 (2022 15:00) (69 - 102)  BP: 138/60 (2022 15:00) (107/87 - 138/60)  BP(mean): 89 (2022 11:16) (78 - 100)  RR: 20 (2022 15:00) (18 - 20)  SpO2: 98% (2022 11:16) (94% - 98%)  CAPILLARY BLOOD GLUCOSE          General: NAD. Looks stated age.  HEENT: clean oropharynx, EOMI, no LAD. Lt lower field cut  Neck: trachea midline, no thyromegaly  CV: nl S1 S2; no m/r/g  Resp: decreased breath sounds at base  GI: NT/ND/S +BS  MS: no clubbing/cyanosis/edema, +pulses  Neuro: motor, sensory intact; + reflexes  Skin: no rashes, nl turgor  Psychiatric: AA0x3 w/ intact insight and judgement    tele: SR, nonspecific changes (on my own evaluation of tele monitor)    LABS:                        12.5   11.88 )-----------( 438      ( 2022 07:20 )             38.1     04-    140  |  102  |  14  ----------------------------<  82  4.0   |  24  |  0.7    Ca    9.2      2022 07:20  Phos  3.9     -  Mg     2.0     -    TPro  6.6  /  Alb  4.2  /  TBili  0.4  /  DBili  x   /  AST  22  /  ALT  36  /  AlkPhos  114  04-27    LIVER FUNCTIONS - ( 2022 07:20 )  Alb: 4.2 g/dL / Pro: 6.6 g/dL / ALK PHOS: 114 U/L / ALT: 36 U/L / AST: 22 U/L / GGT: x               EKG - pending  Chart and consultant noted personally reviewed.  Care Discussed with Consultants/Other Providers/ Housestaff [ x] YES [ ] NO   Radiology, labs, old records personally reviewed.

## 2022-04-28 NOTE — CONSULT NOTE ADULT - ASSESSMENT
31 year old female who presented from [facility] as transfer for spontaneous intra cranial hemorrhage. Rheumatology is asked to see patient to evaluate for SLE.  Patient found to have CT head suggesting right parieto-occipital intraparenchymal hematoma measuring approximately 2.8 cm with 2 mm right to left midline shift and relative sulcal effacement in the right apex compared to left that was remonstrated on MRI brain. She had negative MRA, MRV, CTV brain. Labs show positive PHANI 1:320, elevated ESR and CRP, lymphopenia, with negative RNP, Jung, SSA, SSB, Scleroderma, RF, ANCA, LAC. dsDNA, CCP, lupus profile is pending. No leukopenia, anemia, thrombocytopenia, elevated creatinine. She underwent cerebral angiography today follow up final report. She has hair loss, sicca symptoms, rash, ? intermittent joint pain. No oral or genital ulcers, sinusitis, epistaxis, sinus problems. No signs or symptoms of ANCA vasculitis, Behcets, APS, Takayasu. Unclear if her signs and symptoms are related to an underlying diagnosis of SLE. Will follow up serologies pending and check C3, C4, urinalysis, anticardiolipin and beta 2 glycoprotein IgA, IgM, IgG to complete evaluation.  31 year old female who presented from [facility] as transfer for spontaneous intra cranial hemorrhage. Rheumatology is asked to see patient to evaluate for SLE.  Patient had presenting symptoms of severe headache and visual changes, and found to have CT head suggesting right parieto-occipital intraparenchymal hematoma measuring approximately 2.8 cm with 2 mm right to left midline shift and relative sulcal effacement in the right apex compared to left that was remonstrated on MRI brain. She had negative MRA, MRV, CTV brain. Labs show positive PHANI 1:320, elevated ESR and CRP, lymphopenia, with negative RNP, Jung, SSA, SSB, Scleroderma, RF, ANCA, LAC. dsDNA, CCP, lupus profile is pending. No leukopenia, anemia, thrombocytopenia, elevated creatinine. +FH of SLE, RA, ? APS/+aPL antibodies. She underwent cerebral angiography today follow up final report. She has hair loss, sicca symptoms, rash, ? intermittent joint pain. No oral or genital ulcers, sinusitis, epistaxis, sinus problems. No signs or symptoms of ANCA vasculitis, Behcets, APS, Takayasu. Unclear if her signs and symptoms are related to an underlying diagnosis of SLE. Will follow up serologies pending and check C3, C4, urinalysis, anticardiolipin and beta 2 glycoprotein IgA, IgM, IgG to complete evaluation.

## 2022-04-28 NOTE — BRIEF OPERATIVE NOTE - OPERATION/FINDINGS
Uneventful diagnostic cerebral angiogram with Dr. Castro. Post-procedure NIHSS 0, patient complaining of mild headache (2/10) and 2/10 groin tenderness. No vasculitis observed on global assessment, but a more detailed note will follow. Right groin sealed with an angioseal closure device. 5 mg Versed and 150 mcg Fentanyl received for procedure. Procedure: Diagnostic Cerebral Angiogram    Amount and type of contrast: Visipaque 320    Medications given during procedure: See anethesiology note.   Implants placed: None.  Complications: None    Post-procedure exam:   NIHSS: 0   Extremity - Right lower extremity groin dressing CDI without evidence of hematoma, bleeding, infection, nontender to palpation, soft. Distal pulses palpable bilaterally.   Abd - NTND  Disposition: IR, then 3E  Please follow post NI orders for neuro checks, distal pulses, vitals, and groin checks placed for total of 4 hours recovery period following procedure. Please keep reverse trendelenburg, knee immobilizer, bed rest x 4 hrs. Keep IVF maintenance @75 until dysphagia screen / PO diet resumes.     Please notify provider with any signs of bleeding or hematoma at right groin site, change in mental status, vitals outside parameters, or absent distal pulses.   Management per Floor, 3E  [phone]

## 2022-04-28 NOTE — PROGRESS NOTE ADULT - SUBJECTIVE AND OBJECTIVE BOX
Neurology Progress Note    Interval History:    Patient was seen and examined, no acute event over night. Complains of mild occipital headache.     Medications:  acetaminophen     Tablet .. 650 milliGRAM(s) Oral every 6 hours PRN  artificial  tears Solution 1 Drop(s) Both EYES every 4 hours PRN  chlorhexidine 4% Liquid 1 Application(s) Topical every 12 hours  heparin   Injectable 5000 Unit(s) SubCutaneous every 8 hours  oxyCODONE    IR 5 milliGRAM(s) Oral every 6 hours PRN  sodium chloride 0.9%. 1000 milliLiter(s) IV Continuous <Continuous>      Vital Signs Last 24 Hrs  T(C): 36.4 (27 Apr 2022 20:27), Max: 36.9 (27 Apr 2022 15:00)  T(F): 97.6 (27 Apr 2022 20:27), Max: 98.4 (27 Apr 2022 15:00)  HR: 94 (28 Apr 2022 07:39) (86 - 101)  BP: 110/59 (28 Apr 2022 07:39) (100/56 - 138/60)  BP(mean): 80 (28 Apr 2022 07:39) (73 - 97)  RR: 18 (28 Apr 2022 07:39) (18 - 20)  SpO2: 96% (28 Apr 2022 07:39) (96% - 98%)    Neurological Examination:  General:  Appearance is consistent with chronologic age.  No abnormal facies.  Gross skin survey within normal limits.    Cognitive/Language:  Awake, alert, and oriented to person, place, time and date.  Recent and remote memory intact.  Fund of knowledge is appropriate.  Naming, repetition and comprehension intact.   Nondysarthric.    Cranial Nerves  - Eyes: Visual acuity intact, Visual fields full.  EOMI w/o nystagmus, skew or reported double vision.  PERRL.  No ptosis/weakness of eyelid closure.    - Face:  Facial sensation normal V1 - 3, no facial asymmetry.    - Ears/Nose/Throat:  Hearing grossly intact b/l to finger rub.  Palate elevates midline.  Tongue and uvula midline.   Motor examination:  (MRC grade R/L) 5/5 UE; 5/5 LE.  No observable drift. Normal tone and bulk. No tenderness, twitching, tremors or involuntary movements.  Sensory examination:   Intact to light touch and pinprick, pain, temperature and proprioception and vibration in all extremities.  Reflexes:   2+ b/l biceps, triceps, brachioradialis, patella and achilles.  Plantar response downgoing b/l.  Jaw jerk, Emanuel, clonus absent.  Cerebellum:   FTN/HKS intact.  No dysmetria or dysdiadokinesia.  Gait narrow based and normal.    Labs:  CBC Full  -  ( 27 Apr 2022 07:20 )  WBC Count : 11.88 K/uL  RBC Count : 4.84 M/uL  Hemoglobin : 12.5 g/dL  Hematocrit : 38.1 %  Platelet Count - Automated : 438 K/uL  Mean Cell Volume : 78.7 fL  Mean Cell Hemoglobin : 25.8 pg  Mean Cell Hemoglobin Concentration : 32.8 g/dL  Auto Neutrophil # : 7.82 K/uL  Auto Lymphocyte # : 3.00 K/uL  Auto Monocyte # : 0.73 K/uL  Auto Eosinophil # : 0.21 K/uL  Auto Basophil # : 0.08 K/uL  Auto Neutrophil % : 65.8 %  Auto Lymphocyte % : 25.3 %  Auto Monocyte % : 6.1 %  Auto Eosinophil % : 1.8 %  Auto Basophil % : 0.7 %    04-27    140  |  102  |  14  ----------------------------<  82  4.0   |  24  |  0.7    Ca    9.2      27 Apr 2022 07:20  Phos  3.9     04-27  Mg     2.0     04-27    TPro  6.6  /  Alb  4.2  /  TBili  0.4  /  DBili  x   /  AST  22  /  ALT  36  /  AlkPhos  114  04-27    LIVER FUNCTIONS - ( 27 Apr 2022 07:20 )  Alb: 4.2 g/dL / Pro: 6.6 g/dL / ALK PHOS: 114 U/L / ALT: 36 U/L / AST: 22 U/L / GGT: x                 RADIOLOGY & ADDITIONAL TESTS:  MRA HEAD:    Normal distal internal carotid arteries.    The proximal anterior, middle and posterior cerebral arteries are patent   bilaterally.    Normal vertebrobasilar system.    No evidence of vascular stenosis, occlusion, aneurysm or vascular   malformation.      MRV HEAD:    The dural venous sinuses and deep venous structures are patent and   demonstrate normal course and caliber.    The jugular bulbs and proximal internal jugular veins are patent.      IMPRESSION:    MRI BRAIN:  Redemonstrated right occipital intraparenchymal hemorrhage with mild   surrounding edemaand mass effect. No evidence of underlying mass or   vascular malformation.    MRA HEAD:  Unremarkable.    MRV HEAD: .  Unremarkable.    --- End of Report ---

## 2022-04-28 NOTE — PROGRESS NOTE ADULT - SUBJECTIVE AND OBJECTIVE BOX
Patient is a 31y old  Female who presents with a chief complaint of ICH       HPI:  31F with a past medical history of migraines on Sumitriptan PRN, pre-eclampsia requiring induction/, presented to [facility] @ 4am on  complaining of headache and visual disturbances. States it was sharp, worse than her usual migraines. CTA head revealed 3.2 x 1.7 cm acute R occipital intraparenchymal hemorrhage. MR Brain w/wo contrast in afternoon with stable hematoma, ?venous angioma. She was started on Keppra. Normotensive upon arrival. Patient transferred to [facility] via EMS per family request. Upon arrival to NSICU, vital signs within normal limits, neuro exam with L visual field deficit, consistent with sign out per [facility], otherwise no focal deficits. ICH=0, GCS 15. She complains of headache and "unable to focus her eyesight" and intermittent dizziness. Patient denies fever, drug use, recent travel, numbness, tingling, weakness, chest pain, SOB, abdominal pain, N/V, diarrhea, dysuria.    Of note, patient has two sisters with Hemophilia A.   She had negative MRA, MRV, CTV brain. Labs show positive PHANI 1:320, elevated ESR and CRP, lymphopenia, with negative RNP, Jung, SSA, SSB, Scleroderma, RF, ANCA, LAC. dsDNA, CCP, lupus profile is pending. No leukopenia, anemia, thrombocytopenia, elevated creatinine. +FH of SLE, RA, ? APS/+aPL antibodies. She underwent cerebral angiography today follow up final report.           PHYSICAL EXAM    Vital Signs Last 24 Hrs  T(C): 36.4 (2022 20:27), Max: 36.4 (2022 20:27)  T(F): 97.6 (2022 20:27), Max: 97.6 (2022 20:27)  HR: 94 (2022 10:57) (86 - 94)  BP: 110/59 (2022 10:57) (100/56 - 134/69)  BP(mean): 80 (2022 10:57) (73 - 97)  RR: 18 (2022 10:57) (18 - 18)  SpO2: 96% (2022 10:57) (96% - 97%)    Constitutional - NAD  Chest - CTA  Cardiovascular - S1S2+  Abdomen -  Soft  Extremities -  No calf tenderness   Function : bed mobility / transfer independent                  ambulate 150' w/o AD sup    acetaminophen     Tablet .. 650 milliGRAM(s) Oral every 6 hours PRN  artificial  tears Solution 1 Drop(s) Both EYES every 4 hours PRN  chlorhexidine 4% Liquid 1 Application(s) Topical every 12 hours  heparin   Injectable 5000 Unit(s) SubCutaneous every 8 hours  oxyCODONE    IR 5 milliGRAM(s) Oral every 6 hours PRN  sodium chloride 0.9%. 1000 milliLiter(s) IV Continuous <Continuous>      RECENT LABS/IMAGING                        12.5   11.88 )-----------( 438      ( 2022 07:20 )             38.1         140  |  102  |  14  ----------------------------<  82  4.0   |  24  |  0.7    Ca    9.2      2022 07:20  Phos  3.9       Mg     2.0         TPro  6.6  /  Alb  4.2  /  TBili  0.4  /  DBili  x   /  AST  22  /  ALT  36  /  AlkPhos  114

## 2022-04-28 NOTE — PROGRESS NOTE ADULT - ASSESSMENT
Pt is a 30 yo F with PMhx of PCOS, migraines, who presented with thunderclap headache in a setting of frequent Imitrex use.    # Right occipital parasagittal IPH: . Ddx RCVS, vasculitis, venous thrombosis, vs less likely underlying mass/tumor. ?Hemophilia  - MRI brain, MRA head and MRV head pending  - Possible DSA tomorrow  - tylenol prn for pain  - Avoid NSAIDs, ATP, or other vasoconstrictive therapy  - ESR 68, CRP 17  - Autoimmune labs pending  - May start CCB pending vessel imaging  - contiue DVT ppx  - q4 neurochecks  - tele  - PT/OT/ST  - STAT CT head for any neuro decline  - will order studies for hemophilia due to family history and menorrhagia. f/u results    #H/o Migraines  -no Imitrex (pt aware)  -will need prophylactic medication on d/c    #PCOS  -previously on Metformin  -oupt f/u w/ GYN    #Obesity  -wt loss advised    #H/o pre-eclampsia  -monitor BP    DVT Px w/ Lovenox, SCDs    #Progress Note Handoff  Pending: Consults____Clinical improvement and stability__x___Tests__MRIs, DSA_____  Pt/Family discussion: Pt informed and agrees with the current plan  Disposition: Home____    My note supersedes the residents note should a discrepancy arise.    Chart and notes personally reviewed.  Care Discussed with Consultants/Other Providers/ Housestaff [ x] YES [ ] NO   Radiology, labs, old records personally reviewed.    discussed w/ housestaff, nursing, case management, neuro team     Pt is a 32 yo F with PMhx of PCOS, migraines, who presented with thunderclap headache in a setting of frequent Imitrex use.    # Right occipital parasagittal IPH: . Ddx RCVS, vasculitis, venous thrombosis, vs less likely underlying mass/tumor. ?Hemophilia  - MRI brain, MRA head and MRV negative  - DSA today  - tylenol prn for pain  - Avoid NSAIDs, ATP, or other vasoconstrictive therapy  - ESR 68, CRP 17  - f/u autoimmune labs   - May start CCB pending vessel imaging  - contiue DVT ppx  - q4 neurochecks  - tele  - PT/OT/ST  - STAT CT head for any neuro decline  - f/u studies for hemophilia due to family history and menorrhagia. f/u results  - +PHANI: rheum eval    #H/o Migraines  -no Imitrex (pt aware)  -will need prophylactic medication on d/c    #PCOS  -previously on Metformin  -oupt f/u w/ GYN    #Obesity  -wt loss advised    #H/o pre-eclampsia  -monitor BP    DVT Px w/ Lovenox, SCDs    #Progress Note Handoff  Pending: Consults____Clinical improvement and stability__x___Tests_ DSA_____  Pt/Family discussion: Pt informed and agrees with the current plan  Disposition: Home____    My note supersedes the residents note should a discrepancy arise.    Chart and notes personally reviewed.  Care Discussed with Consultants/Other Providers/ Housestaff [ x] YES [ ] NO   Radiology, labs, old records personally reviewed.    discussed w/ housestaff, nursing, case management, neuro team

## 2022-04-28 NOTE — PROGRESS NOTE ADULT - ASSESSMENT
Imp ; rehab of R IPH  Plan : continue bedside therapy / high functioning           d/c home when medically cleared / no need for 4a rehab

## 2022-04-28 NOTE — PRE PROCEDURE NOTE - PRE PROCEDURE EVALUATION
Interventional Neuro Radiology  Pre-Procedure Note RACHEL    This is a 31-year-old right-hand-dominant female      HPI:  31F with a past medical history of migraines on Sumitriptan PRN, pre-eclampsia requiring induction/, presented to [facility] @ 4am on  complaining of headache and visual disturbances. States it was sharp, worse than her usual migraines. CTA head revealed 3.2 x 1.7 cm acute R occipital intraparenchymal hemorrhage. MR Brain w/wo contrast in afternoon with stable hematoma, ?venous angioma. She was started on Keppra. Normotensive upon arrival. Patient transferred to [facility] via EMS per family request. Upon arrival to NSICU, vital signs within normal limits, neuro exam with L visual field deficit, consistent with sign out per [facility], otherwise no focal deficits. ICH=0, GCS 15. She complains of headache and "unable to focus her eyesight" and intermittent dizziness. Patient denies fever, drug use, recent travel, numbness, tingling, weakness, chest pain, SOB, abdominal pain, N/V, diarrhea, dysuria.    Of note, patient has two sisters with Hemophilia A.    (2022 00:06)    Allergies: No Known Allergies    PAST MEDICAL & SURGICAL HISTORY:  Bicornuate uterus  Migraine headache  H/O unilateral salpingectomy    FAMILY HISTORY:  Family history of systemic lupus erythematosus (SLE) in mother (Mother)  Family history of hemophilia A (Sibling)  Family history of antiphospholipid syndrome (Mother)    Current Medications: acetaminophen     Tablet .. 650 milliGRAM(s) Oral every 6 hours PRN  artificial  tears Solution 1 Drop(s) Both EYES every 4 hours PRN  chlorhexidine 4% Liquid 1 Application(s) Topical every 12 hours  heparin   Injectable 5000 Unit(s) SubCutaneous every 8 hours  oxyCODONE    IR 5 milliGRAM(s) Oral every 6 hours PRN  sodium chloride 0.9%. 1000 milliLiter(s) IV Continuous <Continuous>    Labs:                         12.5   11.88 )-----------( 438      ( 2022 07:20 )             38.1           140  |  102  |  14  ----------------------------<  82  4.0   |  24  |  0.7    Ca    9.2      2022 07:20  Phos  3.9       Mg     2.0         TPro  6.6  /  Alb  4.2  /  TBili  0.4  /  DBili  x   /  AST  22  /  ALT  36  /  AlkPhos  114      Blood Bank: 22  --  A NEG  --    Assessment/Plan:     This is a 31y  year old right-hand-dominant female presents for DSA   Patient presents to neuro-IR for Diagnostic Cerebral Angiogram .   Procedure, goals, risks, benefits and alternatives  were discussed with patient and patient's family.  All questions were answered to best understanding.   Risks discussed include but are not limited to stroke, vessel injury, hemorrhage, and or right  groin hematoma.  Patient demonstrates understanding  of all risks involved with this procedure and wishes to continue.     Appropriate  content was obtained from patient and consent is in the patient's chart.

## 2022-04-28 NOTE — PROGRESS NOTE ADULT - SUBJECTIVE AND OBJECTIVE BOX
ISREAL DOWELL  31y  Female    Patient is a 31y old  Female who presents with a chief complaint of ICH (2022 14:37)      HPI:  31F with a past medical history of migraines on Sumitriptan PRN, pre-eclampsia requiring induction/, presented to [facility] @ 4am on  complaining of headache and visual disturbances. States it was sharp, worse than her usual migraines. CTA head revealed 3.2 x 1.7 cm acute R occipital intraparenchymal hemorrhage. MR Brain w/wo contrast in afternoon with stable hematoma, ?venous angioma. She was started on Keppra. Normotensive upon arrival. Patient transferred to [facility] via EMS per family request. Upon arrival to NSICU, vital signs within normal limits, neuro exam with L visual field deficit, consistent with sign out per [facility], otherwise no focal deficits. ICH=0, GCS 15. She complains of headache and "unable to focus her eyesight" and intermittent dizziness. Patient denies fever, drug use, recent travel, numbness, tingling, weakness, chest pain, SOB, abdominal pain, N/V, diarrhea, dysuria. Reports that sometimes uses Imitrex almost every day and sometimes multiple times per day. Not taking any migraine prophylaxis.    Of note, patient has two sisters with Hemophilia A.    (2022 00:06)    S: Patient was examined and seen at bedside. This morning pt is resting comfortably in bed and reports no new issues or overnight events. No complaints, feels better. Still Lt field cut. Pt reports menses sometimes lasting more than 2 wks.  Denies CP, SOB, N/V/D/C/AP, cough, F, chills, dizziness, new focal weakness, new vision changes, dysuria, or urinary symptoms, blood in stool.  ROS: all other systems reviewed and are negative    PAST MEDICAL & SURGICAL HISTORY:  Bicornuate uterus  PCOS  Migraine headache  Obesity  H/O unilateral salpingectomy      SOCIAL HISTORY:  Tobacco: denies  Illicit drugs: denies  Alcohol: social  Family history reviewed. Mother with some type of vasculitis. Sisters w/ Hemophilia A  ALLERGIES: NKDA    MEDICATIONS  (STANDING):  chlorhexidine 4% Liquid 1 Application(s) Topical every 12 hours  heparin   Injectable 5000 Unit(s) SubCutaneous every 8 hours  LORazepam   Injectable 0.5 milliGRAM(s) IV Push once    MEDICATIONS  (PRN):  acetaminophen     Tablet .. 650 milliGRAM(s) Oral every 6 hours PRN Mild Pain (1 - 3)  artificial  tears Solution 1 Drop(s) Both EYES every 4 hours PRN Dry Eyes  oxyCODONE    IR 5 milliGRAM(s) Oral every 6 hours PRN Moderate Pain (4 - 6)    Home Medications:          Vital Signs Last 24 Hrs  T(C): 37.1 (2022 21:09), Max: 37.1 (2022 21:09)  T(F): 98.7 (2022 21:09), Max: 98.7 (2022 21:09)  HR: 101 (2022 15:00) (69 - 102)  BP: 138/60 (2022 15:00) (107/87 - 138/60)  BP(mean): 89 (2022 11:16) (78 - 100)  RR: 20 (2022 15:00) (18 - 20)  SpO2: 98% (2022 11:16) (94% - 98%)  CAPILLARY BLOOD GLUCOSE          General: NAD. Looks stated age.  HEENT: clean oropharynx, EOMI, no LAD. Lt lower field cut  Neck: trachea midline, no thyromegaly  CV: nl S1 S2; no m/r/g  Resp: decreased breath sounds at base  GI: NT/ND/S +BS  MS: no clubbing/cyanosis/edema, +pulses  Neuro: motor, sensory intact; + reflexes  Skin: no rashes, nl turgor  Psychiatric: AA0x3 w/ intact insight and judgement    tele: SR, nonspecific changes (on my own evaluation of tele monitor)    LABS:                        12.5   11.88 )-----------( 438      ( 2022 07:20 )             38.1     04-    140  |  102  |  14  ----------------------------<  82  4.0   |  24  |  0.7    Ca    9.2      2022 07:20  Phos  3.9     -  Mg     2.0     -    TPro  6.6  /  Alb  4.2  /  TBili  0.4  /  DBili  x   /  AST  22  /  ALT  36  /  AlkPhos  114  04-27    LIVER FUNCTIONS - ( 2022 07:20 )  Alb: 4.2 g/dL / Pro: 6.6 g/dL / ALK PHOS: 114 U/L / ALT: 36 U/L / AST: 22 U/L / GGT: x               EKG - pending  Chart and consultant noted personally reviewed.  Care Discussed with Consultants/Other Providers/ Housestaff [ x] YES [ ] NO   Radiology, labs, old records personally reviewed.           ISREAL DOWELL  31y  Female    Patient is a 31y old  Female who presents with a chief complaint of ICH (2022 14:37)      HPI:  31F with a past medical history of migraines on Sumitriptan PRN, pre-eclampsia requiring induction/, presented to [facility] @ 4am on  complaining of headache and visual disturbances. States it was sharp, worse than her usual migraines. CTA head revealed 3.2 x 1.7 cm acute R occipital intraparenchymal hemorrhage. MR Brain w/wo contrast in afternoon with stable hematoma, ?venous angioma. She was started on Keppra. Normotensive upon arrival. Patient transferred to [facility] via EMS per family request. Upon arrival to NSICU, vital signs within normal limits, neuro exam with L visual field deficit, consistent with sign out per [facility], otherwise no focal deficits. ICH=0, GCS 15. She complains of headache and "unable to focus her eyesight" and intermittent dizziness. Patient denies fever, drug use, recent travel, numbness, tingling, weakness, chest pain, SOB, abdominal pain, N/V, diarrhea, dysuria. Reports that sometimes uses Imitrex almost every day and sometimes multiple times per day. Not taking any migraine prophylaxis.    Of note, patient has two sisters with Hemophilia A.    (2022 00:06)    S: No events    PAST MEDICAL & SURGICAL HISTORY:  Bicornuate uterus  PCOS  Migraine headache  Obesity  H/O unilateral salpingectomy      SOCIAL HISTORY:  Tobacco: denies  Illicit drugs: denies  Alcohol: social  Family history reviewed. Mother with some type of vasculitis. Sisters w/ Hemophilia A  ALLERGIES: NKDA            MEDICATIONS  (STANDING):  chlorhexidine 4% Liquid 1 Application(s) Topical every 12 hours  heparin   Injectable 5000 Unit(s) SubCutaneous every 8 hours  sodium chloride 0.9%. 1000 milliLiter(s) (50 mL/Hr) IV Continuous <Continuous>    MEDICATIONS  (PRN):  acetaminophen     Tablet .. 650 milliGRAM(s) Oral every 6 hours PRN Mild Pain (1 - 3)  artificial  tears Solution 1 Drop(s) Both EYES every 4 hours PRN Dry Eyes  oxyCODONE    IR 5 milliGRAM(s) Oral every 6 hours PRN Moderate Pain (4 - 6)    Home Medications:    Vital Signs Last 24 Hrs  T(C): 36.6 (2022 18:15), Max: 36.6 (2022 18:15)  T(F): 97.9 (2022 18:15), Max: 97.9 (2022 18:15)  HR: 93 (2022 18:15) (86 - 94)  BP: 101/63 (2022 18:15) (100/56 - 118/79)  BP(mean): 80 (2022 10:57) (73 - 88)  RR: 18 (2022 18:15) (18 - 18)  SpO2: 95% (2022 18:15) (95% - 97%)  CAPILLARY BLOOD GLUCOSE        LABS:                        12.5   11.88 )-----------( 438      ( 2022 07:20 )             38.1     -    140  |  102  |  14  ----------------------------<  82  4.0   |  24  |  0.7    Ca    9.2      2022 07:20  Phos  3.9     -  Mg     2.0     -    TPro  6.6  /  Alb  4.2  /  TBili  0.4  /  DBili  x   /  AST  22  /  ALT  36  /  AlkPhos  114  04-27    LIVER FUNCTIONS - ( 2022 07:20 )  Alb: 4.2 g/dL / Pro: 6.6 g/dL / ALK PHOS: 114 U/L / ALT: 36 U/L / AST: 22 U/L / GGT: x                           Consultant Notes Reviewed:  [x ] YES  [ ] NO  Care Discussed with Consultants/Other Providers/ Housestaff [ x] YES  [ ] NO  Radiology, labs, new studies personally reviewed.

## 2022-04-28 NOTE — PROGRESS NOTE ADULT - ASSESSMENT
Pt is a 32 yo F with PMHx of PCOS, migraines, who presented with thunderclap headache, FHx hemophilia A. MRI, MRA and MRV brain is performed and resulted normal as above except the recent R occipital IPH with mild surrounding edema and mass effect.     # Right occipital parasagittal IPH: ICH score 0, NIHSS 1. Ddx RCVS, vasculitis, venous thrombosis, vs less likely underlying mass/tumor.   - MRI brain, MRA head and MRV head performed.   - ptn is NPO for DSA today.  - Tylenol prn for pain  - Avoid NSAIDs, ATP, or other vasoconstrictive therapy  - ESR 68, CRP 17  - Autoimmune labs pending, PHANI elevated 1:320`  - May start CCB pending vessel imaging  - contiue DVT ppx  - q4 neurochecks  - tele  - PT/OT/ST, PMNR rec home with outpatient PT, otherwise independent.   - STAT CT head for any neuro decline  - DVT PPx SCD and Heparin sc.     Dispo: Home with outptn PT, 24-48h.    Pt is a 32 yo F with PMHx of PCOS, migraines, who presented with thunderclap headache, FHx hemophilia A. MRI, MRA and MRV brain is performed and resulted normal as above except the recent R occipital IPH with mild surrounding edema and mass effect.     # Right occipital parasagittal IPH: ICH score 0, NIHSS 1. Ddx RCVS, vasculitis, venous thrombosis, vs less likely underlying mass/tumor.   - MRI brain, MRA head and MRV head performed.   - ptn is NPO for DSA today.  - Tylenol prn for pain  - Avoid NSAIDs, ATP, or other vasoconstrictive therapy  - ESR 68, CRP 17  - Autoimmune labs pending, PHANI elevated 1:320`  - May start CCB pending vessel imaging  - contiue DVT ppx  - q4 neurochecks  - tele  - PT/OT/ST, PMNR rec home with outpatient PT, otherwise independent.   - STAT CT head for any neuro decline  - DVT PPx SCD and Heparin sc.   - Rheumatology consulted.     Dispo: Home with outptn PT, 24-48h.

## 2022-04-29 LAB
ANION GAP SERPL CALC-SCNC: 10 MMOL/L — SIGNIFICANT CHANGE UP (ref 7–14)
ANION GAP SERPL CALC-SCNC: 11 MMOL/L — SIGNIFICANT CHANGE UP (ref 7–14)
BASOPHILS # BLD AUTO: 0.04 K/UL — SIGNIFICANT CHANGE UP (ref 0–0.2)
BASOPHILS NFR BLD AUTO: 0.3 % — SIGNIFICANT CHANGE UP (ref 0–1)
BUN SERPL-MCNC: 12 MG/DL — SIGNIFICANT CHANGE UP (ref 10–20)
BUN SERPL-MCNC: 12 MG/DL — SIGNIFICANT CHANGE UP (ref 10–20)
CALCIUM SERPL-MCNC: 8.7 MG/DL — SIGNIFICANT CHANGE UP (ref 8.5–10.1)
CALCIUM SERPL-MCNC: 9 MG/DL — SIGNIFICANT CHANGE UP (ref 8.5–10.1)
CCP IGG SERPL-ACNC: <8 UNITS — SIGNIFICANT CHANGE UP
CHLORIDE SERPL-SCNC: 103 MMOL/L — SIGNIFICANT CHANGE UP (ref 98–110)
CHLORIDE SERPL-SCNC: 107 MMOL/L — SIGNIFICANT CHANGE UP (ref 98–110)
CO2 SERPL-SCNC: 24 MMOL/L — SIGNIFICANT CHANGE UP (ref 17–32)
CO2 SERPL-SCNC: 24 MMOL/L — SIGNIFICANT CHANGE UP (ref 17–32)
CREAT SERPL-MCNC: 0.6 MG/DL — LOW (ref 0.7–1.5)
CREAT SERPL-MCNC: 0.6 MG/DL — LOW (ref 0.7–1.5)
EGFR: 121 ML/MIN/1.73M2 — SIGNIFICANT CHANGE UP
EGFR: 121 ML/MIN/1.73M2 — SIGNIFICANT CHANGE UP
EOSINOPHIL # BLD AUTO: 0.22 K/UL — SIGNIFICANT CHANGE UP (ref 0–0.7)
EOSINOPHIL NFR BLD AUTO: 1.7 % — SIGNIFICANT CHANGE UP (ref 0–8)
GLUCOSE SERPL-MCNC: 114 MG/DL — HIGH (ref 70–99)
GLUCOSE SERPL-MCNC: 116 MG/DL — HIGH (ref 70–99)
HCT VFR BLD CALC: 33.2 % — LOW (ref 37–47)
HCT VFR BLD CALC: 34.4 % — LOW (ref 37–47)
HGB BLD-MCNC: 11.1 G/DL — LOW (ref 12–16)
HGB BLD-MCNC: 11.5 G/DL — LOW (ref 12–16)
IMM GRANULOCYTES NFR BLD AUTO: 0.2 % — SIGNIFICANT CHANGE UP (ref 0.1–0.3)
LYMPHOCYTES # BLD AUTO: 18.5 % — LOW (ref 20.5–51.1)
LYMPHOCYTES # BLD AUTO: 2.41 K/UL — SIGNIFICANT CHANGE UP (ref 1.2–3.4)
MAGNESIUM SERPL-MCNC: 1.9 MG/DL — SIGNIFICANT CHANGE UP (ref 1.8–2.4)
MCHC RBC-ENTMCNC: 26.3 PG — LOW (ref 27–31)
MCHC RBC-ENTMCNC: 26.4 PG — LOW (ref 27–31)
MCHC RBC-ENTMCNC: 33.4 G/DL — SIGNIFICANT CHANGE UP (ref 32–37)
MCHC RBC-ENTMCNC: 33.4 G/DL — SIGNIFICANT CHANGE UP (ref 32–37)
MCV RBC AUTO: 78.7 FL — LOW (ref 81–99)
MCV RBC AUTO: 78.9 FL — LOW (ref 81–99)
MONOCYTES # BLD AUTO: 0.73 K/UL — HIGH (ref 0.1–0.6)
MONOCYTES NFR BLD AUTO: 5.6 % — SIGNIFICANT CHANGE UP (ref 1.7–9.3)
NEUTROPHILS # BLD AUTO: 9.6 K/UL — HIGH (ref 1.4–6.5)
NEUTROPHILS NFR BLD AUTO: 73.7 % — SIGNIFICANT CHANGE UP (ref 42.2–75.2)
NRBC # BLD: 0 /100 WBCS — SIGNIFICANT CHANGE UP (ref 0–0)
NRBC # BLD: 0 /100 WBCS — SIGNIFICANT CHANGE UP (ref 0–0)
PHOSPHATE SERPL-MCNC: 3.8 MG/DL — SIGNIFICANT CHANGE UP (ref 2.1–4.9)
PLATELET # BLD AUTO: 385 K/UL — SIGNIFICANT CHANGE UP (ref 130–400)
PLATELET # BLD AUTO: 417 K/UL — HIGH (ref 130–400)
POTASSIUM SERPL-MCNC: 3.7 MMOL/L — SIGNIFICANT CHANGE UP (ref 3.5–5)
POTASSIUM SERPL-MCNC: 3.8 MMOL/L — SIGNIFICANT CHANGE UP (ref 3.5–5)
POTASSIUM SERPL-SCNC: 3.7 MMOL/L — SIGNIFICANT CHANGE UP (ref 3.5–5)
POTASSIUM SERPL-SCNC: 3.8 MMOL/L — SIGNIFICANT CHANGE UP (ref 3.5–5)
RBC # BLD: 4.21 M/UL — SIGNIFICANT CHANGE UP (ref 4.2–5.4)
RBC # BLD: 4.37 M/UL — SIGNIFICANT CHANGE UP (ref 4.2–5.4)
RBC # FLD: 13.8 % — SIGNIFICANT CHANGE UP (ref 11.5–14.5)
RBC # FLD: 13.8 % — SIGNIFICANT CHANGE UP (ref 11.5–14.5)
RF+CCP IGG SER-IMP: NEGATIVE — SIGNIFICANT CHANGE UP
RHEUMATOID FACT SERPL-ACNC: <10 IU/ML — SIGNIFICANT CHANGE UP (ref 0–13)
SODIUM SERPL-SCNC: 137 MMOL/L — SIGNIFICANT CHANGE UP (ref 135–146)
SODIUM SERPL-SCNC: 142 MMOL/L — SIGNIFICANT CHANGE UP (ref 135–146)
WBC # BLD: 11.23 K/UL — HIGH (ref 4.8–10.8)
WBC # BLD: 13.03 K/UL — HIGH (ref 4.8–10.8)
WBC # FLD AUTO: 11.23 K/UL — HIGH (ref 4.8–10.8)
WBC # FLD AUTO: 13.03 K/UL — HIGH (ref 4.8–10.8)

## 2022-04-29 PROCEDURE — 99232 SBSQ HOSP IP/OBS MODERATE 35: CPT

## 2022-04-29 RX ORDER — NORTRIPTYLINE HYDROCHLORIDE 10 MG/1
10 CAPSULE ORAL DAILY
Refills: 0 | Status: DISCONTINUED | OUTPATIENT
Start: 2022-04-29 | End: 2022-04-30

## 2022-04-29 RX ORDER — MAGNESIUM SULFATE 500 MG/ML
1 VIAL (ML) INJECTION ONCE
Refills: 0 | Status: COMPLETED | OUTPATIENT
Start: 2022-04-29 | End: 2022-04-29

## 2022-04-29 RX ADMIN — Medication 650 MILLIGRAM(S): at 19:29

## 2022-04-29 RX ADMIN — CHLORHEXIDINE GLUCONATE 1 APPLICATION(S): 213 SOLUTION TOPICAL at 18:32

## 2022-04-29 RX ADMIN — CHLORHEXIDINE GLUCONATE 1 APPLICATION(S): 213 SOLUTION TOPICAL at 05:08

## 2022-04-29 RX ADMIN — NORTRIPTYLINE HYDROCHLORIDE 10 MILLIGRAM(S): 10 CAPSULE ORAL at 11:13

## 2022-04-29 RX ADMIN — HEPARIN SODIUM 5000 UNIT(S): 5000 INJECTION INTRAVENOUS; SUBCUTANEOUS at 05:07

## 2022-04-29 RX ADMIN — HEPARIN SODIUM 5000 UNIT(S): 5000 INJECTION INTRAVENOUS; SUBCUTANEOUS at 21:09

## 2022-04-29 RX ADMIN — Medication 650 MILLIGRAM(S): at 20:04

## 2022-04-29 RX ADMIN — Medication 100 GRAM(S): at 11:13

## 2022-04-29 RX ADMIN — HEPARIN SODIUM 5000 UNIT(S): 5000 INJECTION INTRAVENOUS; SUBCUTANEOUS at 14:14

## 2022-04-29 NOTE — PROGRESS NOTE ADULT - ASSESSMENT
Pt is a 30 yo F with PMhx of PCOS, migraines, who presented with thunderclap headache in a setting of frequent Imitrex use.    # Right occipital parasagittal IPH: . Ddx RCVS, vasculitis, venous thrombosis, vs less likely underlying mass/tumor. ?Hemophilia  - MRI brain, MRA head and MRV negative  - DSA today  - tylenol prn for pain  - Avoid NSAIDs, ATP, or other vasoconstrictive therapy  - ESR 68, CRP 17  - f/u autoimmune labs   - May start CCB pending vessel imaging  - contiue DVT ppx  - q4 neurochecks  - tele  - PT/OT/ST  - STAT CT head for any neuro decline  - f/u studies for hemophilia due to family history and menorrhagia. f/u results  - +PHANI: rheum eval    #H/o Migraines  -no Imitrex (pt aware)  -will need prophylactic medication on d/c    #PCOS  -previously on Metformin  -oupt f/u w/ GYN    #Obesity  -wt loss advised    #H/o pre-eclampsia  -monitor BP    DVT Px w/ Lovenox, SCDs    #Progress Note Handoff  Pending: Consults____Clinical improvement and stability__x___Tests_ DSA_____  Pt/Family discussion: Pt informed and agrees with the current plan  Disposition: Home____    My note supersedes the residents note should a discrepancy arise.    Chart and notes personally reviewed.  Care Discussed with Consultants/Other Providers/ Housestaff [ x] YES [ ] NO   Radiology, labs, old records personally reviewed.    discussed w/ housestaff, nursing, case management, neuro team     Pt is a 30 yo F with PMhx of PCOS, migraines, who presented with thunderclap headache in a setting of frequent Imitrex use.    # Right occipital parasagittal IPH: . Ddx RCVS, vasculitis, venous thrombosis, vs less likely underlying mass/tumor. ?Hemophilia  - MRI brain, MRA head and MRV negative  - DSA today  - tylenol prn for pain  - Avoid NSAIDs, ATP, or other vasoconstrictive therapy  - ESR 68, CRP 17  - f/u autoimmune labs   - contiue DVT ppx  - q4 neurochecks  - tele  - PT/OT/ST  - STAT CT head for any neuro decline  - f/u studies for hemophilia due to family history and menorrhagia. f/u results  - +PHANI: rheum eval noted. F/u studies  -cerebral angio w/out spasm or AVMs  -nl PT/PTT   -f/u outstanding hemophilia workup and consider heme eval inpt or outpt    #H/o Migraines  -no Imitrex (pt aware)  -will need prophylactic medication on d/c    #PCOS  -previously on Metformin  -oupt f/u w/ GYN    #Obesity  -wt loss advised    #H/o pre-eclampsia  -monitor BP    DVT Px w/ Lovenox, SCDs    #Progress Note Handoff  Pending: Consults____Clinical improvement and stability__x___  Pt/Family discussion: Pt informed and agrees with the current plan  Disposition: Home____    My note supersedes the residents note should a discrepancy arise.    Chart and notes personally reviewed.  Care Discussed with Consultants/Other Providers/ Housestaff [ x] YES [ ] NO   Radiology, labs, old records personally reviewed.    discussed w/ housestaff, nursing, case management, neuro team

## 2022-04-29 NOTE — PROGRESS NOTE ADULT - SUBJECTIVE AND OBJECTIVE BOX
ISREAL DOWELL  31y  Female    Patient is a 31y old  Female who presents with a chief complaint of ICH (2022 14:37)    S: pt seen and examined. No new complaints. No severe HA.    HPI:  31F with a past medical history of migraines on Sumitriptan PRN, pre-eclampsia requiring induction/, presented to [facility] @ 4am on  complaining of headache and visual disturbances. States it was sharp, worse than her usual migraines. CTA head revealed 3.2 x 1.7 cm acute R occipital intraparenchymal hemorrhage. MR Brain w/wo contrast in afternoon with stable hematoma, ?venous angioma. She was started on Keppra. Normotensive upon arrival. Patient transferred to [facility] via EMS per family request. Upon arrival to NSICU, vital signs within normal limits, neuro exam with L visual field deficit, consistent with sign out per [facility], otherwise no focal deficits. ICH=0, GCS 15. She complains of headache and "unable to focus her eyesight" and intermittent dizziness. Patient denies fever, drug use, recent travel, numbness, tingling, weakness, chest pain, SOB, abdominal pain, N/V, diarrhea, dysuria. Reports that sometimes uses Imitrex almost every day and sometimes multiple times per day. Not taking any migraine prophylaxis.    Of note, patient has two sisters with Hemophilia A.    (2022 00:06)      PAST MEDICAL & SURGICAL HISTORY:  Bicornuate uterus  PCOS  Migraine headache  Obesity  H/O unilateral salpingectomy      SOCIAL HISTORY:  Tobacco: denies  Illicit drugs: denies  Alcohol: social  Family history reviewed. Mother with some type of vasculitis. Sisters w/ Hemophilia A  ALLERGIES: NKDA    Gen: NAD, AA0x3  HEENT: EOMI, no LAD  CV: nl S1 S2  Resp: decreased BS b/l  GI: NT/ND/S +BS, obese  MS: no c/c/e, +pulses; c/d/i seal Rt groin  Neuro: nonfocal, +reflexes        MEDICATIONS  (STANDING):  chlorhexidine 4% Liquid 1 Application(s) Topical every 12 hours  heparin   Injectable 5000 Unit(s) SubCutaneous every 8 hours  sodium chloride 0.9%. 1000 milliLiter(s) (50 mL/Hr) IV Continuous <Continuous>    MEDICATIONS  (PRN):  acetaminophen     Tablet .. 650 milliGRAM(s) Oral every 6 hours PRN Mild Pain (1 - 3)  artificial  tears Solution 1 Drop(s) Both EYES every 4 hours PRN Dry Eyes  oxyCODONE    IR 5 milliGRAM(s) Oral every 6 hours PRN Moderate Pain (4 - 6)    Home Medications:    Vital Signs Last 24 Hrs  T(C): 36.6 (2022 18:15), Max: 36.6 (2022 18:15)  T(F): 97.9 (2022 18:15), Max: 97.9 (2022 18:15)  HR: 93 (2022 18:15) (86 - 94)  BP: 101/63 (2022 18:15) (100/56 - 118/79)  BP(mean): 80 (2022 10:57) (73 - 88)  RR: 18 (2022 18:15) (18 - 18)  SpO2: 95% (2022 18:15) (95% - 97%)  CAPILLARY BLOOD GLUCOSE        LABS:                        12.5   11.88 )-----------( 438      ( 2022 07:20 )             38.1     04-    140  |  102  |  14  ----------------------------<  82  4.0   |  24  |  0.7    Ca    9.2      2022 07:20  Phos  3.9     04-  Mg     2.0     -    TPro  6.6  /  Alb  4.2  /  TBili  0.4  /  DBili  x   /  AST  22  /  ALT  36  /  AlkPhos  114  04-27    LIVER FUNCTIONS - ( 2022 07:20 )  Alb: 4.2 g/dL / Pro: 6.6 g/dL / ALK PHOS: 114 U/L / ALT: 36 U/L / AST: 22 U/L / GGT: x                           Consultant Notes Reviewed:  [x ] YES  [ ] NO  Care Discussed with Consultants/Other Providers/ Housestaff [ x] YES  [ ] NO  Radiology, labs, new studies personally reviewed.                                 ISREAL DOWELL  31y  Female    Patient is a 31y old  Female who presents with a chief complaint of ICH (2022 14:37)    S: pt seen and examined. No new complaints. No severe HA.    HPI:  31F with a past medical history of migraines on Sumitriptan PRN, pre-eclampsia requiring induction/, presented to [facility] @ 4am on  complaining of headache and visual disturbances. States it was sharp, worse than her usual migraines. CTA head revealed 3.2 x 1.7 cm acute R occipital intraparenchymal hemorrhage. MR Brain w/wo contrast in afternoon with stable hematoma, ?venous angioma. She was started on Keppra. Normotensive upon arrival. Patient transferred to [facility] via EMS per family request. Upon arrival to NSICU, vital signs within normal limits, neuro exam with L visual field deficit, consistent with sign out per [facility], otherwise no focal deficits. ICH=0, GCS 15. She complains of headache and "unable to focus her eyesight" and intermittent dizziness. Patient denies fever, drug use, recent travel, numbness, tingling, weakness, chest pain, SOB, abdominal pain, N/V, diarrhea, dysuria. Reports that sometimes uses Imitrex almost every day and sometimes multiple times per day. Not taking any migraine prophylaxis.    Of note, patient has two sisters with Hemophilia A.    (2022 00:06)      PAST MEDICAL & SURGICAL HISTORY:  Bicornuate uterus  PCOS  Migraine headache  Obesity  H/O unilateral salpingectomy      SOCIAL HISTORY:  Tobacco: denies  Illicit drugs: denies  Alcohol: social  Family history reviewed. Mother with some type of vasculitis. Sisters w/ Hemophilia A  ALLERGIES: NKDA    Gen: NAD, AA0x3, Lt lower field cut  HEENT: EOMI, no LAD  CV: nl S1 S2  Resp: decreased BS b/l  GI: NT/ND/S +BS, obese  MS: no c/c/e, +pulses; c/d/i seal Rt groin  Neuro: nonfocal, +reflexes        MEDICATIONS  (STANDING):  chlorhexidine 4% Liquid 1 Application(s) Topical every 12 hours  heparin   Injectable 5000 Unit(s) SubCutaneous every 8 hours  sodium chloride 0.9%. 1000 milliLiter(s) (50 mL/Hr) IV Continuous <Continuous>    MEDICATIONS  (PRN):  acetaminophen     Tablet .. 650 milliGRAM(s) Oral every 6 hours PRN Mild Pain (1 - 3)  artificial  tears Solution 1 Drop(s) Both EYES every 4 hours PRN Dry Eyes  oxyCODONE    IR 5 milliGRAM(s) Oral every 6 hours PRN Moderate Pain (4 - 6)    Home Medications:    Vital Signs Last 24 Hrs  T(C): 36.6 (2022 18:15), Max: 36.6 (2022 18:15)  T(F): 97.9 (2022 18:15), Max: 97.9 (2022 18:15)  HR: 93 (2022 18:15) (86 - 94)  BP: 101/63 (2022 18:15) (100/56 - 118/79)  BP(mean): 80 (2022 10:57) (73 - 88)  RR: 18 (2022 18:15) (18 - 18)  SpO2: 95% (2022 18:15) (95% - 97%)  CAPILLARY BLOOD GLUCOSE        LABS:                        12.5   11.88 )-----------( 438      ( 2022 07:20 )             38.1     04-27    140  |  102  |  14  ----------------------------<  82  4.0   |  24  |  0.7    Ca    9.2      2022 07:20  Phos  3.9     04-  Mg     2.0     -    TPro  6.6  /  Alb  4.2  /  TBili  0.4  /  DBili  x   /  AST  22  /  ALT  36  /  AlkPhos  114  04-27    LIVER FUNCTIONS - ( 2022 07:20 )  Alb: 4.2 g/dL / Pro: 6.6 g/dL / ALK PHOS: 114 U/L / ALT: 36 U/L / AST: 22 U/L / GGT: x                           Consultant Notes Reviewed:  [x ] YES  [ ] NO  Care Discussed with Consultants/Other Providers/ Housestaff [ x] YES  [ ] NO  Radiology, labs, new studies personally reviewed.

## 2022-04-29 NOTE — PROGRESS NOTE ADULT - ATTENDING COMMENTS
Pt is a 32 yo F with PMhx of PCOS, migraines, who presented with thunderclap headache.    # Right occipital parasagittal IPH: ICH score 0, NIHSS 1. Ddx RCVS, vasculitis, venous thrombosis, vs less likely underlying mass/tumor. MRI brain without evidence of microhemorrhages, while cannot completely exclude underlying lesion, not seen on post-contrast imaging. MRA head without evidence of stenosis/vasoconstriction, MRV wnl.  - DSA today  - ANCA elevated. Rheum consult  - May consider CCB   - PT/OT/ST, tele, q4 neurochecks, SBP <140
DSA without evidence of vasoconstriction or venous thrombosis.   Rheum recs pending  downgrade to 3E, possible d/c tomorrow  discussed that patient is now contraindicated to imitrex. Start migraine ppx therapy, nortriptyline 10mg po QHS.
PT W/H/O PRE-ECLAMPSIA (HAS BEEN NORMOTENSIVE AFTERWARDS W/O MEDS), POLYCYSTIC OVARIAN SYNDROME AND MIGRAINE OVIEDO, WHO DEVELOPED SUDDEN/SEVERE/UNUSUALL RT SIDED HA WHICH DID NOT IMPROVE WITH TRIPTAN, WAS FOUND TO HAVE RIGHT CORTICAL PARIETO-OCCIPITAL IPH, AND CTA REPORTED B/L PCA STENOSIS. SHE DENIES SMOKING, USING ANY ILLICIT DRUGS, HERBS, STIMULANTS OR HORMONAL MEDS. AMONGST THE DDX, RCVS AND VASCULOPATHY NEED TO BE CONSIDERED. PLAN OF MANAGEMENT WAS DISCUSSED WITH THE PATIENT, STROKE TEAM, AND OUR NI TEAM.

## 2022-04-29 NOTE — PROGRESS NOTE ADULT - ASSESSMENT
Pt is a 30 yo F with PMHx of PCOS, migraines, who presented with thunderclap headache, FHx hemophilia A. MRI, MRA and MRV brain is performed and resulted normal as above except the recent R occipital IPH with mild surrounding edema and mass effect.     # Right occipital parasagittal IPH: ICH score 0, NIHSS 1. Ddx RCVS, vasculitis, venous thrombosis, vs less likely underlying mass/tumor.   - MRI brain, MRA head and MRV head performed.   - DSA performed, pending results.   - Tylenol prn for pain  - Avoid NSAIDs, ATP, or other vasoconstrictive therapy  - ESR 68, CRP 17  - Autoimmune labs pending, PHANI elevated 1:320`  - May start CCB pending vessel imaging  - contiue DVT ppx  - q4 neurochecks  - tele  - PT/OT/ST, PMNR rec home with outpatient PT, otherwise independent.   - STAT CT head for any neuro decline  - DVT PPx SCD and Heparin sc.   - Rheumatology on board, recs pending rest of autoimmune panel.     Dispo: Home with outptn PT, 24-48h.  Pt is a 32 yo F with PMHx of PCOS, migraines, who presented with thunderclap headache, FHx hemophilia A. MRI, MRA and MRV brain is performed and resulted normal as above except the recent R occipital IPH with mild surrounding edema and mass effect.     # Right occipital parasagittal IPH: ICH score 0, NIHSS 1. Ddx RCVS, vasculitis, venous thrombosis, vs less likely underlying mass/tumor.   - MRI brain, MRA head and MRV head performed.   - DSA performed, pending results.   - Tylenol prn for pain  - Avoid NSAIDs, ATP, or other vasoconstrictive therapy  - ESR 68, CRP 17  - Autoimmune labs pending, PHANI elevated 1:320`  - May start CCB pending vessel imaging  - contiue DVT ppx  - q4 neurochecks  - tele  - PT/OT/ST, PMNR rec home with outpatient PT, otherwise independent.   - STAT CT head for any neuro decline  - DVT PPx SCD and Heparin sc.   - Rheumatology on board, recs pending rest of autoimmune panel.   - Nortriptyline 10mg qd started.   - UA UCx requested.     Dispo: Home with outptn PT, downgrade to 3E.

## 2022-04-30 ENCOUNTER — TRANSCRIPTION ENCOUNTER (OUTPATIENT)
Age: 32
End: 2022-04-30

## 2022-04-30 VITALS — WEIGHT: 212.75 LBS | HEIGHT: 61 IN

## 2022-04-30 LAB
ANION GAP SERPL CALC-SCNC: 11 MMOL/L — SIGNIFICANT CHANGE UP (ref 7–14)
BUN SERPL-MCNC: 10 MG/DL — SIGNIFICANT CHANGE UP (ref 10–20)
CALCIUM SERPL-MCNC: 9.3 MG/DL — SIGNIFICANT CHANGE UP (ref 8.5–10.1)
CHLORIDE SERPL-SCNC: 104 MMOL/L — SIGNIFICANT CHANGE UP (ref 98–110)
CO2 SERPL-SCNC: 24 MMOL/L — SIGNIFICANT CHANGE UP (ref 17–32)
CREAT SERPL-MCNC: 0.6 MG/DL — LOW (ref 0.7–1.5)
EGFR: 121 ML/MIN/1.73M2 — SIGNIFICANT CHANGE UP
GLUCOSE SERPL-MCNC: 92 MG/DL — SIGNIFICANT CHANGE UP (ref 70–99)
HCT VFR BLD CALC: 35.6 % — LOW (ref 37–47)
HGB BLD-MCNC: 11.6 G/DL — LOW (ref 12–16)
MAGNESIUM SERPL-MCNC: 1.8 MG/DL — SIGNIFICANT CHANGE UP (ref 1.8–2.4)
MCHC RBC-ENTMCNC: 26 PG — LOW (ref 27–31)
MCHC RBC-ENTMCNC: 32.6 G/DL — SIGNIFICANT CHANGE UP (ref 32–37)
MCV RBC AUTO: 79.8 FL — LOW (ref 81–99)
NRBC # BLD: 0 /100 WBCS — SIGNIFICANT CHANGE UP (ref 0–0)
PHOSPHATE SERPL-MCNC: 4 MG/DL — SIGNIFICANT CHANGE UP (ref 2.1–4.9)
PLATELET # BLD AUTO: 395 K/UL — SIGNIFICANT CHANGE UP (ref 130–400)
POTASSIUM SERPL-MCNC: 4.3 MMOL/L — SIGNIFICANT CHANGE UP (ref 3.5–5)
POTASSIUM SERPL-SCNC: 4.3 MMOL/L — SIGNIFICANT CHANGE UP (ref 3.5–5)
RBC # BLD: 4.46 M/UL — SIGNIFICANT CHANGE UP (ref 4.2–5.4)
RBC # FLD: 13.7 % — SIGNIFICANT CHANGE UP (ref 11.5–14.5)
SODIUM SERPL-SCNC: 139 MMOL/L — SIGNIFICANT CHANGE UP (ref 135–146)
WBC # BLD: 11.48 K/UL — HIGH (ref 4.8–10.8)
WBC # FLD AUTO: 11.48 K/UL — HIGH (ref 4.8–10.8)

## 2022-04-30 PROCEDURE — 99232 SBSQ HOSP IP/OBS MODERATE 35: CPT

## 2022-04-30 PROCEDURE — 99239 HOSP IP/OBS DSCHRG MGMT >30: CPT

## 2022-04-30 RX ORDER — OXYCODONE HYDROCHLORIDE 5 MG/1
1 TABLET ORAL
Qty: 0 | Refills: 0 | DISCHARGE
Start: 2022-04-30

## 2022-04-30 RX ORDER — NORTRIPTYLINE HYDROCHLORIDE 10 MG/1
1 CAPSULE ORAL
Qty: 30 | Refills: 2
Start: 2022-04-30

## 2022-04-30 RX ORDER — NORTRIPTYLINE HYDROCHLORIDE 10 MG/1
1 CAPSULE ORAL
Qty: 0 | Refills: 0 | DISCHARGE
Start: 2022-04-30

## 2022-04-30 RX ADMIN — HEPARIN SODIUM 5000 UNIT(S): 5000 INJECTION INTRAVENOUS; SUBCUTANEOUS at 13:15

## 2022-04-30 RX ADMIN — CHLORHEXIDINE GLUCONATE 1 APPLICATION(S): 213 SOLUTION TOPICAL at 05:55

## 2022-04-30 RX ADMIN — HEPARIN SODIUM 5000 UNIT(S): 5000 INJECTION INTRAVENOUS; SUBCUTANEOUS at 05:55

## 2022-04-30 RX ADMIN — NORTRIPTYLINE HYDROCHLORIDE 10 MILLIGRAM(S): 10 CAPSULE ORAL at 12:47

## 2022-04-30 NOTE — DISCHARGE NOTE PROVIDER - NSDCFUADDINST_GEN_ALL_CORE_FT
Rehab is a program run by specialists who will help you recover abilities you may have lost. Specialists include physical, occupational, and speech therapists. Physical therapists help you gain strength or keep your balance. Occupational therapists teach you new ways to do daily activities, such as getting dressed. Your therapy may include movements for everyday activities. An example is being able to raise yourself from a chair. A speech therapist helps you improve your ability to talk and swallow.

## 2022-04-30 NOTE — DISCHARGE NOTE NURSING/CASE MANAGEMENT/SOCIAL WORK - PATIENT PORTAL LINK FT
You can access the FollowMyHealth Patient Portal offered by [facility] by registering at the following website: [email or web address]. By joining [facility]’s FollowMyHealth portal, you will also be able to view your health information using other applications (apps) compatible with our system.

## 2022-04-30 NOTE — PROGRESS NOTE ADULT - NS ATTEND AMEND GEN_ALL_CORE FT
Patient seen and examined and agree with above except as noted.  Patients history, notes, labs, imaging, vitals and meds reviewed personally.  No new complaints, some anxiety overnight  Exam normal except slight decrease in vision in left lower visual field    Plan as above (DC today on pamelor for migraine prevention; discussed avoiding all triptans

## 2022-04-30 NOTE — DISCHARGE NOTE PROVIDER - NSDCCPCAREPLAN_GEN_ALL_CORE_FT
PRINCIPAL DISCHARGE DIAGNOSIS  Diagnosis: Intraparenchymal hemorrhage of brain  Assessment and Plan of Treatment: An intracerebral hemorrhage (ICH) is a type of stroke with bleeding in the brain. An ICH happens when a blood vessel tears or bursts. Blood then leaks out of the vessel and slows or stops blood flow to the brain. The leaked blood may also collect in one area. This is called a hematoma. A hematoma can create pressure that keeps oxygen from flowing to the brain. Brain damage may happen within a few minutes if the brain cannot get enough oxygen. An ICH is a medical emergency that needs immediate care.

## 2022-04-30 NOTE — PROGRESS NOTE ADULT - SUBJECTIVE AND OBJECTIVE BOX
Neurology Progress Note    Interval History:    31F with a past medical history of migraines on Sumitriptan PRN, pre-eclampsia requiring induction/, presented to [facility] @ 4am on  complaining of headache and visual disturbances. States it was sharp, worse than her usual migraines. CTA head revealed 3.2 x 1.7 cm acute R occipital intraparenchymal hemorrhage. MR Brain w/wo contrast in afternoon with stable hematoma, ?venous angioma. She was started on Keppra. Normotensive upon arrival. Patient transferred to [facility] via EMS per family request. Upon arrival to NSICU, vital signs within normal limits, neuro exam with L visual field deficit, consistent with sign out per [facility], otherwise no focal deficits. ICH=0, GCS 15. She complains of headache and "unable to focus her eyesight" and intermittent dizziness. Patient denies fever, drug use, recent travel, numbness, tingling, weakness, chest pain, SOB, abdominal pain, N/V, diarrhea, dysuria.    Of note, patient has two sisters with Hemophilia A.    (2022 00:06)        Vital Signs Last 24 Hrs  T(C): 36.1 (2022 05:00), Max: 36.7 (2022 14:39)  T(F): 97 (2022 05:00), Max: 98 (2022 14:39)  HR: 79 (2022 05:00) (78 - 96)  BP: 105/50 (2022 05:00) (105/50 - 133/76)  BP(mean): 94 (2022 20:31) (77 - 94)  RR: 18 (2022 05:00) (16 - 18)  SpO2: 97% (2022 20:31) (89% - 100%)    Neurological Exam:   Mental status: Awake, alert and oriented x3.  Recent and remote memory intact.  Naming, repetition and comprehension intact.  Attention/concentration intact.  No dysarthria, no aphasia.  Fund of knowledge appropriate.    Cranial nerves: Pupils equally round and reactive to light, visual fields full, no nystagmus, extraocular muscles intact, V1 through V3 intact bilaterally and symmetric, face symmetric, hearing intact to finger rub, palate elevation symmetric, tongue was midline.  Motor:  MRC grading 5/5 b/l UE/LE.   strength 5/5.  Normal tone and bulk.  No abnormal movements.    Sensation: Intact to light touch, proprioception, and pinprick.   Coordination: No dysmetria on finger-to-nose and heel-to-shin.  No dysdiadokinesia.  Gait: Narrow and steady. No ataxia.  Romberg negative      NIHSS 0    Medications:  MEDICATIONS  (STANDING):  chlorhexidine 4% Liquid 1 Application(s) Topical every 12 hours  heparin   Injectable 5000 Unit(s) SubCutaneous every 8 hours  nortriptyline 10 milliGRAM(s) Oral daily      Labs:  CBC Full  -  ( 2022 04:30 )  WBC Count : 11.48 K/uL  RBC Count : 4.46 M/uL  Hemoglobin : 11.6 g/dL  Hematocrit : 35.6 %  Platelet Count - Automated : 395 K/uL  Mean Cell Volume : 79.8 fL  Mean Cell Hemoglobin : 26.0 pg  Mean Cell Hemoglobin Concentration : 32.6 g/dL  Auto Neutrophil # : x  Auto Lymphocyte # : x  Auto Monocyte # : x  Auto Eosinophil # : x  Auto Basophil # : x  Auto Neutrophil % : x  Auto Lymphocyte % : x  Auto Monocyte % : x  Auto Eosinophil % : x  Auto Basophil % : x    04-30    139  |  104  |  10  ----------------------------<  92  4.3   |  24  |  0.6<L>    Ca    9.3      2022 04:30  Phos  4.0     04-30  Mg     1.8     04-30          < from: MR Head w/wo IV Cont (22 @ 20:02) >    MRI BRAIN:  Redemonstrated right occipital intraparenchymal hemorrhage with mild   surrounding edemaand mass effect. No evidence of underlying mass or   vascular malformation.    MRA HEAD:  Unremarkable.    MRV HEAD: .  Unremarkable.    --- End of Report ---            < end of copied text >

## 2022-04-30 NOTE — PROGRESS NOTE ADULT - SUBJECTIVE AND OBJECTIVE BOX
ISREAL DOWELL  31y  Female    Patient is a 31y old  Female who presents with a chief complaint of ICH (2022 14:37)    S: pt seen and examined. No new complaints. No severe HA.    HPI:  31F with a past medical history of migraines on Sumitriptan PRN, pre-eclampsia requiring induction/, presented to [facility] @ 4am on  complaining of headache and visual disturbances. States it was sharp, worse than her usual migraines. CTA head revealed 3.2 x 1.7 cm acute R occipital intraparenchymal hemorrhage. MR Brain w/wo contrast in afternoon with stable hematoma, ?venous angioma. She was started on Keppra. Normotensive upon arrival. Patient transferred to [facility] via EMS per family request. Upon arrival to NSICU, vital signs within normal limits, neuro exam with L visual field deficit, consistent with sign out per [facility], otherwise no focal deficits. ICH=0, GCS 15. She complains of headache and "unable to focus her eyesight" and intermittent dizziness. Patient denies fever, drug use, recent travel, numbness, tingling, weakness, chest pain, SOB, abdominal pain, N/V, diarrhea, dysuria. Reports that sometimes uses Imitrex almost every day and sometimes multiple times per day. Not taking any migraine prophylaxis.    Of note, patient has two sisters with Hemophilia A.    (2022 00:06)      PAST MEDICAL & SURGICAL HISTORY:  Bicornuate uterus  PCOS  Migraine headache  Obesity  H/O unilateral salpingectomy      SOCIAL HISTORY:  Tobacco: denies  Illicit drugs: denies  Alcohol: social  Family history reviewed. Mother with some type of vasculitis. Sisters w/ Hemophilia A  ALLERGIES: NKDA    Gen: NAD, AA0x3, Lt lower field cut  HEENT: EOMI, no LAD  CV: nl S1 S2  Resp: decreased BS b/l  GI: NT/ND/S +BS, obese  MS: no c/c/e, +pulses; c/d/i seal Rt groin  Neuro: nonfocal, +reflexes            MEDICATIONS  (STANDING):  chlorhexidine 4% Liquid 1 Application(s) Topical every 12 hours  heparin   Injectable 5000 Unit(s) SubCutaneous every 8 hours  nortriptyline 10 milliGRAM(s) Oral daily    MEDICATIONS  (PRN):  acetaminophen     Tablet .. 650 milliGRAM(s) Oral every 6 hours PRN Mild Pain (1 - 3)  artificial  tears Solution 1 Drop(s) Both EYES every 4 hours PRN Dry Eyes  oxyCODONE    IR 5 milliGRAM(s) Oral every 6 hours PRN Moderate Pain (4 - 6)    Home Medications:  nortriptyline 10 mg oral capsule: 1 cap(s) orally once a day (2022 14:23)  oxyCODONE 5 mg oral tablet: 1 tab(s) orally every 6 hours, As needed, Moderate Pain (4 - 6) (2022 14:23)    Vital Signs Last 24 Hrs  T(C): 36.6 (2022 13:36), Max: 36.7 (2022 14:39)  T(F): 97.8 (2022 13:36), Max: 98 (2022 14:39)  HR: 91 (2022 13:36) (79 - 96)  BP: 104/59 (2022 13:36) (104/59 - 133/76)  BP(mean): 94 (2022 20:31) (77 - 94)  RR: 18 (2022 05:00) (18 - 18)  SpO2: 97% (2022 20:31) (97% - 99%)  CAPILLARY BLOOD GLUCOSE        LABS:                        11.6   11.48 )-----------( 395      ( 2022 04:30 )             35.6     04-30    139  |  104  |  10  ----------------------------<  92  4.3   |  24  |  0.6<L>    Ca    9.3      2022 04:30  Phos  4.0     04-30  Mg     1.8     04-30                        Consultant Notes Reviewed:  [x ] YES  [ ] NO  Care Discussed with Consultants/Other Providers/ Housestaff [ x] YES  [ ] NO  Radiology, labs, new studies personally reviewed.

## 2022-04-30 NOTE — DISCHARGE NOTE PROVIDER - NSDCMRMEDTOKEN_GEN_ALL_CORE_FT
nortriptyline 10 mg oral capsule: 1 cap(s) orally once a day  oxyCODONE 5 mg oral tablet: 1 tab(s) orally every 6 hours, As needed, Moderate Pain (4 - 6)

## 2022-04-30 NOTE — DISCHARGE NOTE NURSING/CASE MANAGEMENT/SOCIAL WORK - NSDCPEFALRISK_GEN_ALL_CORE
For information on Fall & Injury Prevention, visit: [email or web address] OR  [email or web address] OR  https://www.cdc.gov/steadi/patient.html

## 2022-04-30 NOTE — CHART NOTE - NSCHARTNOTEFT_GEN_A_CORE
3E nurse called because patient was feeling very anxious and requested the neuro team. Neurology resident and Neurology ACP went to see the patient. Patient states she had temporary eye floaters which resolved and feel very anxious/tearful about current diagnosis. Stable exam, NIH-1 (for quadrantopia). No further intervention at this time. Communicated with nurse to recheck vitals.

## 2022-04-30 NOTE — PROGRESS NOTE ADULT - NS ATTEST RISK GEN_ALL_CORE
Risk Statement (NON-critical care)
yes

## 2022-04-30 NOTE — DISCHARGE NOTE PROVIDER - NSDCCPTREATMENT_GEN_ALL_CORE_FT
PRINCIPAL PROCEDURE  Procedure: Brain MRI  Findings and Treatment: MRI BRAIN:  Redemonstrated right occipital intraparenchymal hemorrhage with mild   surrounding edema and mass effect. No evidence of underlying mass or   vascular malformation.  MRA HEAD:  Unremarkable.  MRV HEAD: .  Unremarkable.      SECONDARY PROCEDURE  Procedure: MRA head w/contrast  Findings and Treatment: MRI BRAIN:  Redemonstrated right occipital intraparenchymal hemorrhage with mild   surrounding edema and mass effect. No evidence of underlying mass or   vascular malformation.  MRA HEAD:  Unremarkable.  MRV HEAD: .  Unremarkable.    Procedure: MR venography head w con  Findings and Treatment: MRI BRAIN:  Redemonstrated right occipital intraparenchymal hemorrhage with mild   surrounding edema and mass effect. No evidence of underlying mass or   vascular malformation.  MRA HEAD:  Unremarkable.  MRV HEAD: .  Unremarkable.    Procedure: CTA head w/w/o contrast  Findings and Treatment:     Procedure: Arteriogram, cerebral, diagnostic  Findings and Treatment:     Procedure: CT head wo con  Findings and Treatment: Right parieto-occipital intraparenchymal hematoma measuring approximately   2.8 cm with 2 mm right to left midline shift and relative sulcal   effacement in the right apex compared to left. Per notes, previous   outside imaging revealed 3.2 x 1.7 cm right occipital intraparenchymal   hemorrhage. A contrast CT scan or MRI study is suggested to further rule   out the possibility of underlying mass formation.

## 2022-04-30 NOTE — DISCHARGE NOTE PROVIDER - CARE PROVIDER_API CALL
Shoaib Garcia)  EEGEpilepsy; Neurology  [address] [place] [address], [address]  [place] [address]  Phone: [phone]  Fax: [phone]  Follow Up Time:

## 2022-04-30 NOTE — DISCHARGE NOTE PROVIDER - HOSPITAL COURSE
31F with a past medical history of migraines on Sumitriptan PRN, pre-eclampsia requiring induction/, presented to [facility] @ 4am on  complaining of headache and visual disturbances. States it was sharp, worse than her usual migraines. CTA head revealed 3.2 x 1.7 cm acute R occipital intraparenchymal hemorrhage. MR Brain w/wo contrast in afternoon with stable hematoma, ?venous angioma. She was started on Keppra. Normotensive upon arrival. Patient transferred to [facility] via EMS per family request. Upon arrival to NSICU, vital signs within normal limits, neuro exam with L visual field deficit, consistent with sign out per [facility], otherwise no focal deficits. ICH=0, GCS 15. She complains of headache and "unable to focus her eyesight" and intermittent dizziness. Patient denies fever, drug use, recent travel, numbness, tingling, weakness, chest pain, SOB, abdominal pain, N/V, diarrhea, dysuria.    Pt is a 30 yo F with PMHx of PCOS, migraines, who presented with thunderclap headache, FHx hemophilia A. MRI, MRA and MRV brain is performed and resulted normal as above except the recent R occipital IPH with mild surrounding edema and mass effect.     # Right occipital parasagittal IPH: ICH score 0, NIHSS 0 Ddx RCVS, vasculitis, venous thrombosis, vs less likely underlying mass/tumor.   - MRI brain, MRA head and MRV head performed.   - DSA performed, pending results.   - Tylenol prn for pain  - Avoid NSAIDs, ATP, or other vasoconstrictive therapy  - ESR 68, CRP 17  - Autoimmune labs pending, PHANI elevated 1:320  - May start CCB pending vessel imaging  - contiue DVT ppx  - q4 neurochecks  - tele  - PT/OT/ST, PMNR rec home with outpatient PT, otherwise independent.   - STAT CT head for any neuro decline  - DVT PPx SCD and Heparin sc.   - Rheumatology on board, recs pending rest of autoimmune panel.   - Nortriptyline 10mg qd started.   - UA UCx requested.     Dispo: Home with outpatient PT/OT, now on 3E.    31F with a past medical history of migraines on Sumitriptan PRN, pre-eclampsia requiring induction/, presented to [facility] @ 4am on  complaining of headache and visual disturbances. States it was sharp, worse than her usual migraines. CTA head revealed 3.2 x 1.7 cm acute R occipital intraparenchymal hemorrhage. MR Brain w/wo contrast in afternoon with stable hematoma, ?venous angioma. She was started on Keppra. Normotensive upon arrival. Patient transferred to [facility] via EMS per family request. Upon arrival to NSICU, vital signs within normal limits, neuro exam with L visual field deficit, consistent with sign out per [facility], otherwise no focal deficits. ICH=0, GCS 15. She complains of headache and "unable to focus her eyesight" and intermittent dizziness. Patient denies fever, drug use, recent travel, numbness, tingling, weakness, chest pain, SOB, abdominal pain, N/V, diarrhea, dysuria.    Pt is a 30 yo F with PMHx of PCOS, migraines, who presented with thunderclap headache, FHx hemophilia A. MRI, MRA and MRV brain is performed and resulted normal as above except the recent R occipital IPH with mild surrounding edema and mass effect.     # Right occipital parasagittal IPH: ICH score 0, NIHSS 0 Ddx RCVS, vasculitis, venous thrombosis, vs less likely underlying mass/tumor.   - MRI brain, MRA head and MRV head performed.   - DSA performed, pending results.   - Tylenol prn for pain  - Avoid NSAIDs, ATP, or other vasoconstrictive therapy  - ESR 68, CRP 17  - Autoimmune labs pending, PHANI elevated 1:320  - May start CCB pending vessel imaging  - contiue DVT ppx  - q4 neurochecks  - tele  - PT/OT/ST, PMNR rec home with outpatient PT, otherwise independent.   - STAT CT head for any neuro decline  - DVT PPx SCD and Heparin sc.   - Rheumatology on board, recs pending rest of autoimmune panel.   - Nortriptyline 10mg qd started.   - UA UCx requested.     Dispo: Home with outpatient PT/OT, now on 3E.     Attending Attestation:  Patient seen and examined and doing well with no new complaints.  Educated on avoiding all triptans will use pamelor for migraine prophylaxsis

## 2022-04-30 NOTE — DISCHARGE NOTE PROVIDER - NSDCFUADDAPPT_GEN_ALL_CORE_FT
Please follow up with Neurology clinic on [facility] in 2 - 4 weeks after discharge

## 2022-04-30 NOTE — PROGRESS NOTE ADULT - PROVIDER SPECIALTY LIST ADULT
Neurology
Neurology
Critical Care
Hospitalist
Neurology
Hospitalist
NSICU
Physiatry
Hospitalist

## 2022-04-30 NOTE — PROGRESS NOTE ADULT - ASSESSMENT
Impression:  Pt is a 30 yo F with PMHx of PCOS, migraines, who presented with thunderclap headache, FHx hemophilia A. MRI, MRA and MRV brain is performed and resulted normal as above except the recent R occipital IPH with mild surrounding edema and mass effect.     # Right occipital parasagittal IPH: ICH score 0, NIHSS 0 Ddx RCVS, vasculitis, venous thrombosis, vs less likely underlying mass/tumor.   - MRI brain, MRA head and MRV head performed.   - DSA performed, pending results.   - Tylenol prn for pain  - Avoid NSAIDs, ATP, or other vasoconstrictive therapy  - ESR 68, CRP 17  - Autoimmune labs pending, PHANI elevated 1:320`  - May start CCB pending vessel imaging  - contiue DVT ppx  - q4 neurochecks  - tele  - PT/OT/ST, PMNR rec home with outpatient PT, otherwise independent.   - STAT CT head for any neuro decline  - DVT PPx SCD and Heparin sc.   - Rheumatology on board, recs pending rest of autoimmune panel.   - Nortriptyline 10mg qd started.   - UA UCx requested.     Dispo: Home with outpatient PT, now on 3E.

## 2022-04-30 NOTE — PROGRESS NOTE ADULT - HEIGHT IN CM
154.94
Pt presents by EMS from home with an assisted mechanical fall. Per EMS pt was walking with walker, son was behind pt, Pt's Right knee \"buckled\" and she fell down on to right knee with assistance from son. EMS and family deny pt hitting head. Pt is on blood thinner.   
154.94

## 2022-04-30 NOTE — PROGRESS NOTE ADULT - ASSESSMENT
Pt is a 32 yo F with PMhx of PCOS, migraines, who presented with thunderclap headache in a setting of frequent Imitrex use.    # Right occipital parasagittal IPH: . Ddx RCVS, vasculitis, venous thrombosis, vs less likely underlying mass/tumor. ?Hemophilia  - MRI brain, MRA head and MRV negative  - DSA today  - tylenol prn for pain  - Avoid NSAIDs, ATP, or other vasoconstrictive therapy  - ESR 68, CRP 17  - f/u autoimmune labs   - contiue DVT ppx  - q4 neurochecks  - tele  - PT/OT/ST  - STAT CT head for any neuro decline  - f/u studies for hemophilia due to family history and menorrhagia. f/u results  - +PHANI: rheum eval noted. F/u studies  -cerebral angio w/out spasm or AVMs  -nl PT/PTT   -f/u outstanding hemophilia workup and consider heme eval inpt or outpt    #H/o Migraines  -no Imitrex (pt aware)  -will need prophylactic medication on d/c    #PCOS  -previously on Metformin  -oupt f/u w/ GYN    #Obesity  -wt loss advised    #H/o pre-eclampsia  -monitor BP    DVT Px w/ Lovenox, SCDs    Counselled pt about follow up, meds, etc. Pt verbalized understanding.    My note supersedes the residents note should a discrepancy arise.    Chart and notes personally reviewed.  Care Discussed with Consultants/Other Providers/ Housestaff [ x] YES [ ] NO   Radiology, labs, old records personally reviewed.    discussed w/ housestaff, nursing, case management, neuro team,

## 2022-04-30 NOTE — PROGRESS NOTE ADULT - TIME BILLING
Review of imaging and chart; obtaining history; examination of pt; discussion and coordination of care.
Review of imaging and chart; obtaining history; examination of pt; discussion and coordination of care.
time spent on review of labs, imaging studies, old records, obtaining history, personally examining patient, counselling and communicating with patient/ family, entering orders for medications/tests/etc, discussions with other health care providers, documentation in electronic health records, independent interpretation of labs, imaging/procedure results and care coordination.
Review of imaging and chart; obtaining history; examination of pt; discussion and coordination of care.
time spent on review of labs, imaging studies, old records, obtaining history, personally examining patient, counselling and communicating with patient/ family, entering orders for medications/tests/etc, discussions with other health care providers, documentation in electronic health records, independent interpretation of labs, imaging/procedure results and care coordination.
time spent on review of labs, imaging studies, old records, obtaining history, personally examining patient, counselling and communicating with patient/ family, entering orders for medications/tests/etc, discussions with other health care providers, documentation in electronic health records, independent interpretation of labs, imaging/procedure results and care coordination.

## 2022-04-30 NOTE — PROGRESS NOTE ADULT - NS ATTEST RISK PROBLEM GEN_ALL_CORE FT
ICH, high risk for decompensation
ICH, high risk for decompensation, r/o vasculitis or haemophilia
ICH, high risk for decompensation, r/o vasculitis or haemophilia

## 2022-05-02 PROBLEM — Q51.3 BICORNATE UTERUS: Chronic | Status: ACTIVE | Noted: 2018-03-03

## 2022-05-02 PROBLEM — G43.909 MIGRAINE, UNSPECIFIED, NOT INTRACTABLE, WITHOUT STATUS MIGRAINOSUS: Chronic | Status: ACTIVE | Noted: 2022-04-24

## 2022-05-02 LAB
FACT XII ACT/NOR PPP: 127 % — SIGNIFICANT CHANGE UP (ref 70–145)
FACT XIIA PPP-ACNC: 137 % — SIGNIFICANT CHANGE UP (ref 45–150)
VWF:RCO ACT/NOR PPP PL AGG: 108 % — SIGNIFICANT CHANGE UP (ref 40–120)

## 2022-05-03 LAB
DSDNA AB FLD-ACNC: <0.2 AI — SIGNIFICANT CHANGE UP
DSDNA AB SER-ACNC: <12 IU/ML — SIGNIFICANT CHANGE UP
ENA SS-A AB FLD IA-ACNC: <0.2 AI — SIGNIFICANT CHANGE UP
FACT IX PPP CHRO-ACNC: 141 % — HIGH (ref 50–100)

## 2022-05-05 LAB
DRVVT SCREEN TO CONFIRM RATIO: SIGNIFICANT CHANGE UP
LA NT DPL PPP QL: SIGNIFICANT CHANGE UP
NORMALIZED SCT PPP-RTO: SIGNIFICANT CHANGE UP
NORMALIZED SCT PPP-RTO: SIGNIFICANT CHANGE UP RATIO (ref 0–1.16)

## 2022-05-06 ENCOUNTER — APPOINTMENT (OUTPATIENT)
Dept: NEUROLOGY | Facility: CLINIC | Age: 32
End: 2022-05-06
Payer: MEDICAID

## 2022-05-06 ENCOUNTER — NON-APPOINTMENT (OUTPATIENT)
Age: 32
End: 2022-05-06

## 2022-05-06 VITALS
OXYGEN SATURATION: 99 % | SYSTOLIC BLOOD PRESSURE: 129 MMHG | DIASTOLIC BLOOD PRESSURE: 79 MMHG | BODY MASS INDEX: 39.08 KG/M2 | WEIGHT: 207 LBS | HEART RATE: 82 BPM | TEMPERATURE: 97.9 F | HEIGHT: 61 IN

## 2022-05-06 PROCEDURE — 99215 OFFICE O/P EST HI 40 MIN: CPT

## 2022-05-06 RX ORDER — TOPIRAMATE 25 MG/1
25 TABLET, FILM COATED ORAL
Qty: 30 | Refills: 5 | Status: DISCONTINUED | COMMUNITY
Start: 2020-02-06 | End: 2022-05-06

## 2022-05-06 RX ORDER — SUMATRIPTAN 100 MG/1
100 TABLET, FILM COATED ORAL
Qty: 18 | Refills: 5 | Status: DISCONTINUED | COMMUNITY
Start: 2020-02-06 | End: 2022-05-06

## 2022-05-10 LAB — FACT X AG PPP IA-ACNC: 109 % — SIGNIFICANT CHANGE UP

## 2022-05-17 NOTE — CDI QUERY NOTE - NSCDIOTHERTXTBX_GEN_ALL_CORE_HH
CLINICAL INDICATORS    4/30 Discharge Note Provider: presented to [facility] @ 4am on 4/23 complaining of headache and visual disturbances. States it was sharp, worse than her usual migraines. CTA head revealed 3.2 x 1.7 cm acute R occipital intraparenchymal hemorrhage. MR Brain w/wo contrast in afternoon with stable hematoma, ?venous angioma. She was started on Keppra … MRI, MRA and MRV brain is performed and resulted normal as above except the recent R occipital IPH with mild surrounding edema and mass effect.     4/24 CT brain: Right parieto-occipital intraparenchymal hematoma measuring approximately 2.8 cm with 2 mm right to left midline shift and relative sulcal effacement in the right apex compared to left. Per notes, previous outside imaging revealed 3.2 x 1.7 cm right occipital intraparenchymal hemorrhage. A contrast CT scan or MRI study is suggested to further rule out the possibility of underlying mass formation.    4/27 MRI brain: Redemonstrated right occipital intraparenchymal hemorrhage with mild surrounding edema and mass effect. No evidence of underlying mass or vascular malformation.    Based on your professional judgment and the clinical indicators, please clarify if the radiology findings of mass effect and midline shift can be further specified as:   • Mass effect and midline shift with brain compression  • Mass effect and midline shift without brain compression  • Clinically insignificant finding of mass effect and midline shift on the radiology findings  • Other (please specify):  • Unable to determine the clinical significance of mass effect and midline shift on the radiology findings    Thank you,  Nevin Louis RN [facility]  [phone]

## 2022-05-18 NOTE — ED PROVIDER NOTE - TOBACCO USE
Patient sees Dr Delmi Jones at [facility] calling to confirm appointment for tomorrow, confirmed and she will be attending 
Never smoker

## 2022-05-21 ENCOUNTER — INPATIENT (INPATIENT)
Facility: HOSPITAL | Age: 32
LOS: 2 days | Discharge: HOME HEALTH | End: 2022-05-24
Attending: STUDENT IN AN ORGANIZED HEALTH CARE EDUCATION/TRAINING PROGRAM | Admitting: STUDENT IN AN ORGANIZED HEALTH CARE EDUCATION/TRAINING PROGRAM
Payer: MEDICAID

## 2022-05-21 VITALS
TEMPERATURE: 97 F | OXYGEN SATURATION: 99 % | DIASTOLIC BLOOD PRESSURE: 87 MMHG | WEIGHT: 206.79 LBS | HEIGHT: 61 IN | SYSTOLIC BLOOD PRESSURE: 136 MMHG | RESPIRATION RATE: 18 BRPM | HEART RATE: 111 BPM

## 2022-05-21 DIAGNOSIS — Z98.890 OTHER SPECIFIED POSTPROCEDURAL STATES: Chronic | ICD-10-CM

## 2022-05-21 PROCEDURE — 99285 EMERGENCY DEPT VISIT HI MDM: CPT

## 2022-05-21 NOTE — ED ADULT TRIAGE NOTE - CHIEF COMPLAINT QUOTE
I'm very disorientated and I feel pressure on my brain and my vision has changed. I was discharged here on April 30th with a brain bleed - patient

## 2022-05-22 LAB
ALBUMIN SERPL ELPH-MCNC: 4.6 G/DL — SIGNIFICANT CHANGE UP (ref 3.5–5.2)
ALP SERPL-CCNC: 113 U/L — SIGNIFICANT CHANGE UP (ref 30–115)
ALT FLD-CCNC: 29 U/L — SIGNIFICANT CHANGE UP (ref 0–41)
ANION GAP SERPL CALC-SCNC: 10 MMOL/L — SIGNIFICANT CHANGE UP (ref 7–14)
ANION GAP SERPL CALC-SCNC: 11 MMOL/L — SIGNIFICANT CHANGE UP (ref 7–14)
APTT BLD: 32.7 SEC — SIGNIFICANT CHANGE UP (ref 27–39.2)
AST SERPL-CCNC: 20 U/L — SIGNIFICANT CHANGE UP (ref 0–41)
BASOPHILS # BLD AUTO: 0.06 K/UL — SIGNIFICANT CHANGE UP (ref 0–0.2)
BASOPHILS # BLD AUTO: 0.07 K/UL — SIGNIFICANT CHANGE UP (ref 0–0.2)
BASOPHILS NFR BLD AUTO: 0.6 % — SIGNIFICANT CHANGE UP (ref 0–1)
BASOPHILS NFR BLD AUTO: 0.6 % — SIGNIFICANT CHANGE UP (ref 0–1)
BILIRUB SERPL-MCNC: <0.2 MG/DL — SIGNIFICANT CHANGE UP (ref 0.2–1.2)
BLD GP AB SCN SERPL QL: SIGNIFICANT CHANGE UP
BUN SERPL-MCNC: 13 MG/DL — SIGNIFICANT CHANGE UP (ref 10–20)
BUN SERPL-MCNC: 14 MG/DL — SIGNIFICANT CHANGE UP (ref 10–20)
CALCIUM SERPL-MCNC: 9.7 MG/DL — SIGNIFICANT CHANGE UP (ref 8.5–10.1)
CALCIUM SERPL-MCNC: 9.9 MG/DL — SIGNIFICANT CHANGE UP (ref 8.5–10.1)
CHLORIDE SERPL-SCNC: 103 MMOL/L — SIGNIFICANT CHANGE UP (ref 98–110)
CHLORIDE SERPL-SCNC: 105 MMOL/L — SIGNIFICANT CHANGE UP (ref 98–110)
CHOLEST SERPL-MCNC: 234 MG/DL — HIGH
CO2 SERPL-SCNC: 25 MMOL/L — SIGNIFICANT CHANGE UP (ref 17–32)
CO2 SERPL-SCNC: 27 MMOL/L — SIGNIFICANT CHANGE UP (ref 17–32)
CREAT SERPL-MCNC: 0.7 MG/DL — SIGNIFICANT CHANGE UP (ref 0.7–1.5)
CREAT SERPL-MCNC: 0.7 MG/DL — SIGNIFICANT CHANGE UP (ref 0.7–1.5)
EGFR: 117 ML/MIN/1.73M2 — SIGNIFICANT CHANGE UP
EGFR: 117 ML/MIN/1.73M2 — SIGNIFICANT CHANGE UP
EOSINOPHIL # BLD AUTO: 0.12 K/UL — SIGNIFICANT CHANGE UP (ref 0–0.7)
EOSINOPHIL # BLD AUTO: 0.27 K/UL — SIGNIFICANT CHANGE UP (ref 0–0.7)
EOSINOPHIL NFR BLD AUTO: 1.2 % — SIGNIFICANT CHANGE UP (ref 0–8)
EOSINOPHIL NFR BLD AUTO: 2.4 % — SIGNIFICANT CHANGE UP (ref 0–8)
GLUCOSE SERPL-MCNC: 108 MG/DL — HIGH (ref 70–99)
GLUCOSE SERPL-MCNC: 99 MG/DL — SIGNIFICANT CHANGE UP (ref 70–99)
HCG SERPL QL: NEGATIVE — SIGNIFICANT CHANGE UP
HCT VFR BLD CALC: 36.4 % — LOW (ref 37–47)
HCT VFR BLD CALC: 38.1 % — SIGNIFICANT CHANGE UP (ref 37–47)
HDLC SERPL-MCNC: 56 MG/DL — SIGNIFICANT CHANGE UP
HGB BLD-MCNC: 11.7 G/DL — LOW (ref 12–16)
HGB BLD-MCNC: 12.6 G/DL — SIGNIFICANT CHANGE UP (ref 12–16)
IMM GRANULOCYTES NFR BLD AUTO: 0.3 % — SIGNIFICANT CHANGE UP (ref 0.1–0.3)
IMM GRANULOCYTES NFR BLD AUTO: 0.3 % — SIGNIFICANT CHANGE UP (ref 0.1–0.3)
INR BLD: 1.06 RATIO — SIGNIFICANT CHANGE UP (ref 0.65–1.3)
LIPID PNL WITH DIRECT LDL SERPL: 154 MG/DL — HIGH
LYMPHOCYTES # BLD AUTO: 1.9 K/UL — SIGNIFICANT CHANGE UP (ref 1.2–3.4)
LYMPHOCYTES # BLD AUTO: 18.7 % — LOW (ref 20.5–51.1)
LYMPHOCYTES # BLD AUTO: 2.93 K/UL — SIGNIFICANT CHANGE UP (ref 1.2–3.4)
LYMPHOCYTES # BLD AUTO: 26.1 % — SIGNIFICANT CHANGE UP (ref 20.5–51.1)
MAGNESIUM SERPL-MCNC: 1.8 MG/DL — SIGNIFICANT CHANGE UP (ref 1.8–2.4)
MCHC RBC-ENTMCNC: 25.9 PG — LOW (ref 27–31)
MCHC RBC-ENTMCNC: 26.1 PG — LOW (ref 27–31)
MCHC RBC-ENTMCNC: 32.1 G/DL — SIGNIFICANT CHANGE UP (ref 32–37)
MCHC RBC-ENTMCNC: 33.1 G/DL — SIGNIFICANT CHANGE UP (ref 32–37)
MCV RBC AUTO: 79 FL — LOW (ref 81–99)
MCV RBC AUTO: 80.5 FL — LOW (ref 81–99)
MONOCYTES # BLD AUTO: 0.66 K/UL — HIGH (ref 0.1–0.6)
MONOCYTES # BLD AUTO: 0.78 K/UL — HIGH (ref 0.1–0.6)
MONOCYTES NFR BLD AUTO: 6.5 % — SIGNIFICANT CHANGE UP (ref 1.7–9.3)
MONOCYTES NFR BLD AUTO: 7 % — SIGNIFICANT CHANGE UP (ref 1.7–9.3)
NEUTROPHILS # BLD AUTO: 7.13 K/UL — HIGH (ref 1.4–6.5)
NEUTROPHILS # BLD AUTO: 7.37 K/UL — HIGH (ref 1.4–6.5)
NEUTROPHILS NFR BLD AUTO: 63.6 % — SIGNIFICANT CHANGE UP (ref 42.2–75.2)
NEUTROPHILS NFR BLD AUTO: 72.7 % — SIGNIFICANT CHANGE UP (ref 42.2–75.2)
NON HDL CHOLESTEROL: 178 MG/DL — HIGH
NRBC # BLD: 0 /100 WBCS — SIGNIFICANT CHANGE UP (ref 0–0)
NRBC # BLD: 0 /100 WBCS — SIGNIFICANT CHANGE UP (ref 0–0)
PLATELET # BLD AUTO: 424 K/UL — HIGH (ref 130–400)
PLATELET # BLD AUTO: 466 K/UL — HIGH (ref 130–400)
POTASSIUM SERPL-MCNC: 3.9 MMOL/L — SIGNIFICANT CHANGE UP (ref 3.5–5)
POTASSIUM SERPL-MCNC: 4.5 MMOL/L — SIGNIFICANT CHANGE UP (ref 3.5–5)
POTASSIUM SERPL-SCNC: 3.9 MMOL/L — SIGNIFICANT CHANGE UP (ref 3.5–5)
POTASSIUM SERPL-SCNC: 4.5 MMOL/L — SIGNIFICANT CHANGE UP (ref 3.5–5)
PROT SERPL-MCNC: 7.4 G/DL — SIGNIFICANT CHANGE UP (ref 6–8)
PROTHROM AB SERPL-ACNC: 12.2 SEC — SIGNIFICANT CHANGE UP (ref 9.95–12.87)
RBC # BLD: 4.52 M/UL — SIGNIFICANT CHANGE UP (ref 4.2–5.4)
RBC # BLD: 4.82 M/UL — SIGNIFICANT CHANGE UP (ref 4.2–5.4)
RBC # FLD: 13.4 % — SIGNIFICANT CHANGE UP (ref 11.5–14.5)
RBC # FLD: 13.5 % — SIGNIFICANT CHANGE UP (ref 11.5–14.5)
SARS-COV-2 RNA SPEC QL NAA+PROBE: SIGNIFICANT CHANGE UP
SODIUM SERPL-SCNC: 140 MMOL/L — SIGNIFICANT CHANGE UP (ref 135–146)
SODIUM SERPL-SCNC: 141 MMOL/L — SIGNIFICANT CHANGE UP (ref 135–146)
TRIGL SERPL-MCNC: 118 MG/DL — SIGNIFICANT CHANGE UP
TROPONIN T SERPL-MCNC: <0.01 NG/ML — SIGNIFICANT CHANGE UP
WBC # BLD: 10.14 K/UL — SIGNIFICANT CHANGE UP (ref 4.8–10.8)
WBC # BLD: 11.21 K/UL — HIGH (ref 4.8–10.8)
WBC # FLD AUTO: 10.14 K/UL — SIGNIFICANT CHANGE UP (ref 4.8–10.8)
WBC # FLD AUTO: 11.21 K/UL — HIGH (ref 4.8–10.8)

## 2022-05-22 PROCEDURE — 93010 ELECTROCARDIOGRAM REPORT: CPT

## 2022-05-22 PROCEDURE — 99223 1ST HOSP IP/OBS HIGH 75: CPT

## 2022-05-22 PROCEDURE — 70450 CT HEAD/BRAIN W/O DYE: CPT | Mod: 26,MA

## 2022-05-22 RX ORDER — ACETAMINOPHEN 500 MG
650 TABLET ORAL EVERY 6 HOURS
Refills: 0 | Status: DISCONTINUED | OUTPATIENT
Start: 2022-05-22 | End: 2022-05-24

## 2022-05-22 RX ORDER — NORTRIPTYLINE HYDROCHLORIDE 10 MG/1
10 CAPSULE ORAL AT BEDTIME
Refills: 0 | Status: DISCONTINUED | OUTPATIENT
Start: 2022-05-22 | End: 2022-05-24

## 2022-05-22 RX ORDER — METOCLOPRAMIDE HCL 10 MG
5 TABLET ORAL ONCE
Refills: 0 | Status: COMPLETED | OUTPATIENT
Start: 2022-05-22 | End: 2022-05-22

## 2022-05-22 RX ORDER — HYDROMORPHONE HYDROCHLORIDE 2 MG/ML
0.5 INJECTION INTRAMUSCULAR; INTRAVENOUS; SUBCUTANEOUS ONCE
Refills: 0 | Status: DISCONTINUED | OUTPATIENT
Start: 2022-05-22 | End: 2022-05-22

## 2022-05-22 RX ORDER — ACETAMINOPHEN 500 MG
650 TABLET ORAL ONCE
Refills: 0 | Status: COMPLETED | OUTPATIENT
Start: 2022-05-22 | End: 2022-05-22

## 2022-05-22 RX ADMIN — NORTRIPTYLINE HYDROCHLORIDE 10 MILLIGRAM(S): 10 CAPSULE ORAL at 21:55

## 2022-05-22 RX ADMIN — Medication 650 MILLIGRAM(S): at 21:57

## 2022-05-22 RX ADMIN — NORTRIPTYLINE HYDROCHLORIDE 10 MILLIGRAM(S): 10 CAPSULE ORAL at 04:00

## 2022-05-22 RX ADMIN — Medication 650 MILLIGRAM(S): at 15:18

## 2022-05-22 RX ADMIN — HYDROMORPHONE HYDROCHLORIDE 0.5 MILLIGRAM(S): 2 INJECTION INTRAMUSCULAR; INTRAVENOUS; SUBCUTANEOUS at 03:20

## 2022-05-22 RX ADMIN — Medication 5 MILLIGRAM(S): at 03:20

## 2022-05-22 RX ADMIN — Medication 650 MILLIGRAM(S): at 03:13

## 2022-05-22 NOTE — CONSULT NOTE ADULT - ATTENDING COMMENTS
Pt is a 30 yo F with PMHx of migraine, recent right occipital IPH in the setting of frequent triptan use, who presents with left eye vision changes. Describes it as floaters, flashes/splotches of color. Mild headache as well. NIHSS 1 (baseline from last admission). CT head with expected post hemorrhagic changes in right occipital lobe, no new ICH appreciated. Most likely ocular migraine vs occipital sz. Pt admitted to stroke unit, ok to downgrade to 3E. MRI brain with/without, rEEG and tylenol prn for headache.

## 2022-05-22 NOTE — H&P ADULT - NSHPPHYSICALEXAM_GEN_ALL_CORE
VITALS:   T(C): 36.2 (05-21-22 @ 23:39), Max: 36.2 (05-21-22 @ 23:39)  HR: 111 (05-21-22 @ 23:39) (111 - 111)  BP: 136/87 (05-21-22 @ 23:39) (136/87 - 136/87)  RR: 18 (05-21-22 @ 23:39) (18 - 18)  SpO2: 99% (05-21-22 @ 23:39) (99% - 99%)    GENERAL: Moderate distress, sitting up in stretcher  HEAD:  Atraumatic, normocephalic  HEENT: PERRL, EOMI  HEART: Regular rate and rhythm, no murmurs, rubs, or gallops  LUNGS: Unlabored respirations.  Clear to auscultation bilaterally, no crackles, wheezing, or rhonchi  ABDOMEN: Soft, nontender, nondistended, +BS  EXTREMITIES: 2+ peripheral pulses bilaterally. No clubbing, cyanosis, or edema  NERVOUS SYSTEM:  A&Ox4

## 2022-05-22 NOTE — STROKE CODE NOTE - NSSTROKETPAEXCLABS_HEADCT
Head CT suggesting an established acute cerebral infarction as evidenced by unique hypodensity, regardless of size

## 2022-05-22 NOTE — CONSULT NOTE ADULT - ASSESSMENT
31y Female w/ PMH ICH. HCT showed hypodensity in the area of previous ICH. The patient symptoms could be related to seizure activity from that area, or less likely to be related to a new ischemic stroke in that location. There are no signs of a new ICH    Plan:  - Admit to stroke unit   - MR brain w/wo   - EEG   - Ativan 2mg IV for GTCs > 2 min only  - Neurological assessment Q8hrs  - Seizure & Fall precautions  - Maintain Mg> 2 mmol/l  - Bp goals 120-160 mmHg   - Neuro checks Q4H   - Vitals Q4H

## 2022-05-22 NOTE — ED PROVIDER NOTE - NS ED ROS FT
Constitutional: Negative for fever, chills, and fatigue.  HENT: Negative for headache.  Eyes: + B/L floaters. Negative for eye pain, eye discharge, foreign body sensation  Cardiovascular: Negative for chest pain, and palpitation.  Respiratory: Negative for SOB, wheezing, cough and sputum production.  Gastrointestinal: Negative for nausea, vomiting, abdominal pain,   Genitourinary: Negative for flank pain, dysuria, frequency, and hematuria.  Neurological: Negative for dizziness, syncope, focal weakness, numbness, and loss of consciousness.  Hematological: Does not bruise/bleed easily.

## 2022-05-22 NOTE — ED PROVIDER NOTE - OBJECTIVE STATEMENT
41-year-old female with history of intraparenchymal hemorrhage, and migraine who presents with bilateral floaters that started 10:00 PM last night.  Reports that she was hospitalized at the end of last months for vision disturbance and headache with finding of intraparenchymal hemorrhage.  Reports that she noticed floaters today, she became very nervous and decided come to the ER.  Denies focal weakness and dysarthria.  Denies fever, shortness of breath, chest pain, nausea, vomiting, abdominal pain, urinary symptoms, and change in bowel movement.

## 2022-05-22 NOTE — H&P ADULT - ASSESSMENT
32 yo F with PMHx of Migraines, Intracranial Hemorrhage one month ago presents with vision changes. 30 yo F with PMHx of Migraines, Intracranial Hemorrhage one month ago presents with vision changes.    #Sudden onset of bilateral floaters  - Head CT - hypoattenuation in area of prior intraparenchymal hemorrhage, no evidence of new hemorrhage; increased surrounding hypoattenuation, favors white matter edema  - Admit to stroke unit  - Neurochecks q4  - Seizure and fall precautions  - EEG  - MRI Head w/wo contrast  - Maintain -160  - Ativan 2 mg IV for generalized tonic clonic seizures over 2 minutes

## 2022-05-22 NOTE — ED PROVIDER NOTE - CLINICAL SUMMARY MEDICAL DECISION MAKING FREE TEXT BOX
d/w telestroke, no CTA/CTP, concern for possible partial sz, CTH w/o acute bleed, +edema in area of old bleed. rec admit to stroke unit for neuro checks and further w/u. tele-neuro does not rec AED at this point.

## 2022-05-22 NOTE — H&P ADULT - NSHPLABSRESULTS_GEN_ALL_CORE
LABS:                        12.6   11.21 )-----------( 466      ( 22 May 2022 00:00 )             38.1     05-22    140  |  103  |  14  ----------------------------<  99  3.9   |  27  |  0.7    Ca    9.9      22 May 2022 00:00    TPro  7.4  /  Alb  4.6  /  TBili  <0.2  /  DBili  x   /  AST  20  /  ALT  29  /  AlkPhos  113  05-22    PT/INR - ( 22 May 2022 00:00 )   PT: 12.20 sec;   INR: 1.06 ratio    PTT - ( 22 May 2022 00:00 )  PTT:32.7 sec    Troponin T, Serum: <0.01 ng/mL (05.22.22 @ 00:00)    CT Brain Stroke Protocol (05.22.22 @ 00:05)     IMPRESSION:    Hypoattenuation conforming to the size and site of prior right   parieto-occipital intraparenchymal hematoma, without evidence for   new/acute/hyperdense intracranial hemorrhage. However compared to prior   study there is increased surrounding hypoattenuation which appears to   spare portions of the cortex, favored to reflect white matter edema   although underlying ischemia is not excluded. Recommend further   evaluation with MRI of the brain.

## 2022-05-22 NOTE — H&P ADULT - HISTORY OF PRESENT ILLNESS
32 yo F with PMHx of Migraines, Intracranial Hemorrhage one month ago presents with vision changes. Pt states that she began to see bilateral floaters, which began about 1 hour prior to presentation, prompting her to come to ED. Pt was admitted one month ago when she developed visual disturbances and headache and was found to have intracranial hemorrhage.   In the ED, T 97, /87, , RR 18, SpO2 99% on RA. Code stroke called, NIHSS 1 for partial hemianopia. CT Head revealed increased hypoattenuation, possibly white matter edema vs underlying ischemia. 30 yo F with PMHx of Migraines, Intracranial Hemorrhage one month ago presents with vision changes. Pt states that she began to see bilateral floaters, which began about 1 hour prior to presentation, prompting her to come to ED. Sx associated with mild right sided headache. Prior to initiation of sx, pt felt well and was at the beach. Pt was admitted one month ago when she developed visual disturbances and headache and was found to have intracranial hemorrhage.   In the ED, T 97, /87, , RR 18, SpO2 99% on RA. Code stroke called, NIHSS 1 for partial hemianopia. CT Head revealed increased hypoattenuation, possibly white matter edema vs underlying ischemia.

## 2022-05-22 NOTE — ED PROVIDER NOTE - PHYSICAL EXAMINATION
CONSTITUTIONAL: Well-appearing; in no apparent distress.   HEAD: Normocephalic; atraumatic.   EYES: Pupils are round and reactive, extra-ocular muscles are intact. Eyelids are normal in appearance without swelling or lesions.   ENT: Hearing is intact with good acuity to spoken voice. Patient is speaking clearly, not muffled and airway is intact.   RESPIRATORY: No signs of respiratory distress. Lung sounds are clear in all lobes bilaterally without rales, rhonchi, or wheezes.  CARDIOVASCULAR: Regular rate and rhythm.   GI: Abdomen is soft, non-tender, and without distention. Bowel sounds are present and normoactive in all four quadrants. No masses are noted.   NEURO: A & O x 3. Normal speech. Visual fields are full to confrontation. Pupils are equally reactive to light. Extraocular movement is intact. There is no eye deviation. Facial sensation is intact to light touch. Face is symmetric with normal eye closure and smile. Hearing is normal to spoken voice. Head turning and shoulder shrug are intact. No upper or lower extremity drift.  PSYCHOLOGICAL: Appropriate mood and affect. Good judgement and insight.

## 2022-05-22 NOTE — ED PROVIDER NOTE - ATTENDING CONTRIBUTION TO CARE
31F with a past medical history of migraines on Sumitriptan PRN, pre-eclampsia, admission 4/23 for R occipital intraparenchymal hemorrhage  pt states in the hospital, she developed L eye L lower quadrant visual field loss. pt states today, she felt visual flashing lights and felt "disoriented." no loc/n/v/vision loss. no numbness/weakness/ss/ataxia.     vss  gen- NAD, aaox3  card-rrr  lungs-ctab, no wheezing or rhonchi  abd-sntnd, no guarding or rebound  neuro- full str/sensation, cn ii-xii grossly intact, normal coordination, b/l left lower visual field quadrantanopsia, normal speech    stroke code called at triage  neuro at bedside  will get labs, cth, preg test, card monitor

## 2022-05-22 NOTE — STROKE CODE NOTE - IV ALTEPLASE ADMIN OUTSIDE HIDDEN
ED HPI GENERAL MEDICAL PROBLEM





- General


Chief Complaint: Genitourinary Problem


Stated Complaint: TROUBLE URINATING


Time Seen by Provider: 18 01:38


Source of Information: Reports: Patient


History Limitations: Reports: No Limitations





- History of Present Illness


INITIAL COMMENTS - FREE TEXT/NARRATIVE: 





The patient states that she has had trouble passing urine since yesterday, 

, 2018. She states that she has to bear down hard to urinate even a 

small amount. She reports suprapubic pressure. She denies dysuria, but does 

feel urinary urgency and frequency.





She states that she had similar symptoms once previously. She states that she 

was seen in this ED, but that her symptoms were not looked into, and nothing 

was done. She is unable to say when that was, but that it was within the last 

year. Review of the medical records finds that the patient was seen by Aditi Razo in this ED about 6-1/2 weeks ago, on 2018. The triage nurse at 

that visit wrote "Pt has been having difficulty urinating today and she has a 

burning sensation vaginally". Her complaint to Ms. Razo, however, was right 

back pain and dysuria. There was no mention of difficulty urinating or a 

vaginal burning sensation. Workup included a CBC, CMP, CRP, urinalysis, and a 

urine drug screen. The urine drug screen was positive for marijuana, otherwise, 

her workup was negative. The patient was offered Azo and Toradol, which she 

refused, stating that she had an allergy to Toradol, being hard on her kidneys. 

She was informed that her kidney function was normal and that there was no 

reason that she could not take Toradol. She then reported that she had hives 

and a rash to Toradol. She was offered Benadryl in addition to tramadol, but 

she still refused that. Ms. Razo checked the [place] PMPi, finding that the 

patient had 30 prescriptions from different providers for controlled substances 

within the last year. She also discussed the case with the patient's PCP, Dr. RICHIE Bunn, who also expressed concerns, as the patient has a history of substance 

abuse. The patient's test results were being discussed with the patient when 

she complained of severe right back pain radiating to her right lower abdomen 

and right groin. Ms. Razo discussed the case with me, and I recommended 

that a renal ultrasound be obtained to look for hydronephrosis, as the patient 

had had multiple CT scans in the past. This was done, finding mild 

hydronephrosis. The patient was given IV Dilaudid, and a CT scan of the abdomen 

and pelvis without contrast was then obtained, returning completely normal. The 

patient was discharged home with a recommendation to follow-up with her OB/GYN 

or PCP for further pain management.





The patient's PCP is Dr. Carlos. Her OB/GYN is Dr. Bolton, in [place].








  ** Vaginal


Pain Score (Numeric/FACES): 7





- Related Data


 Allergies











Allergy/AdvReac Type Severity Reaction Status Date / Time


 


codeine Allergy  Rash Verified 18 18:43


 


morphine Allergy  Hives Verified 18 18:43


 


ketorolac tromethamine AdvReac  Rash Verified 18 22:24





[From Toradol]     


 


lorazepam [From Ativan] AdvReac  Hallucinati Verified 18 18:43





   ons  


 


metoclopramide HCl AdvReac  Irritabilit Verified 18 18:43





[From Reglan]   y  











Home Meds: 


 Home Meds





Baclofen 20 mg PO TID PRN 18 [History]


LORazepam [Ativan] 1 mg PO BID PRN 18 [History]


Pentosan Polysulfate Sodium [Elmiron] 100 mg PO DAILY 18 [History]


Solifenacin [Vesicare] 10 mg PO DAILY 18 [History]


busPIRone [Buspar] 15 mg PO TID 18 [History]


Citalopram Hydrobromide [Celexa] 20 mg PO DAILY 18 [History]


Gabapentin [Neurontin] 100 mg PO TID 18 [History]


Topiramate 25 mg PO BID 18 [History]


traMADol [Ultram ER] 100 mg PO BID 18 [History]











Past Medical History


HEENT History: Reports: Impaired Vision


Other HEENT History: glasses and contacts


Genitourinary History: Reports: Renal Calculus, Other (See Below) (Interstitial 

cystitis)


OB/GYN History: Reports: Endometriosis, Pregnancy


Neurological History: Reports: Migraines


Psychiatric History: Reports: Anxiety, Depression


Hematologic History: Reports: Anemia


Dermatologic History: Reports: Eczema





- Past Surgical History


HEENT Surgical History: Reports: Oral Surgery (Bergoo teeth extraction)


GI Surgical History: Reports: Lysis of Adhesions (x 4)


Female  Surgical History: Reports:  Section (x 1), Hysterectomy, 

Kidney stone extraction, Oophorectomy (left), Ureteral Stent (right)





Social & Family History





- Family History


Family Medical History: Noncontributory





- Tobacco Use


Smoking Status *Q: Current Every Day Smoker


Years of Tobacco use: 1


Packs/Tins Daily: 0.2


Packs/Tins Daily Comment: Down from  ppd





- Caffeine Use


Caffeine Use: Reports: None





- Alcohol Use


Alcohol Use History: No





- Recreational Drug Use


Recreational Drug Use: Yes


Drug Use in Last 12 Months: Yes


Recreational Drug Type: Reports: Marijuana/Hashish





- Living Situation & Occupation


Living situation: Reports: , with Spouse, with Family (3 kids)


Occupation: Unemployed





ED ROS GENERAL





- Review of Systems


Review Of Systems: ROS reveals no pertinent complaints other than HPI.





ED EXAM, RENAL/





- Physical Exam


Exam: See Below


Exam Limited By: No Limitations


General Appearance: Alert, WD/WN, No Apparent Distress


Eye Exam: Bilateral Eye: Normal Inspection


Ears: Normal External Exam, Hearing Grossly Normal


Nose: Normal Inspection, No Blood


Throat/Mouth: Normal Inspection, Normal Lips, Normal Voice, No Airway Compromise


Head: Atraumatic, Normocephalic


Neck: Normal Inspection, Full Range of Motion


Respiratory/Chest: No Respiratory Distress, Lungs Clear, Normal Breath Sounds, 

No Accessory Muscle Use


Cardiovascular: Normal Peripheral Pulses, Regular Rate, Rhythm, No Edema, No 

Gallop, No JVD, No Murmur, No Rub


GI/Abdominal: Normal Bowel Sounds, Soft, No Organomegaly, No Distention, No 

Abnormal Bruit, No Mass, Tender (Suprapubic area only. Nontender elsewhere.)


 (Female) Exam: Deferred


Rectal (Female) Exam: Deferred


Back Exam: Normal Inspection, Full Range of Motion.  No: CVA Tenderness (L), 

CVA Tenderness (R)


Extremities: Normal Inspection, Normal Range of Motion, No Pedal Edema, Normal 

Capillary Refill


Neurological: Alert, Oriented, Normal Cognition, No Motor/Sensory Deficits


Psychiatric: Normal Affect


Skin Exam: Warm, Dry, Intact, Normal Color, No Rash





Course





- Vital Signs


Last Recorded V/S: 


 Last Vital Signs











Temp  37.1 C   18 23:40


 


Pulse  79   18 23:40


 


Resp  18   18 23:40


 


BP  124/94 H  18 23:40


 


Pulse Ox  100   18 23:40














- Orders/Labs/Meds


Orders: 


 Active Orders 24 hr











 Category Date Time Status


 


 Navarrete Catheter Insertion [Insert Urinary Catheter] [OM. Care  18 02:15 

Ordered





 PC] Q24H   


 


 Urinary Catheter Assessment [RC] ASDIRECTED Care  18 02:07 Active


 


 UA W/MICROSCOPIC [URIN] Stat Lab  18 02:02 Ordered











Labs: 


 Laboratory Tests











  18 Range/Units





  02:02 


 


Urine Color  Yellow  (Yellow)  


 


Urine Appearance  Clear  (Clear)  


 


Urine pH  7.0  (5.0-8.0)  


 


Ur Specific Gravity  1.020  (1.005-1.030)  


 


Urine Protein  Negative  (Negative)  


 


Urine Glucose (UA)  Negative  (Negative)  


 


Urine Ketones  Negative  (Negative)  


 


Urine Occult Blood  Negative  (Negative)  


 


Urine Nitrite  Negative  (Negative)  


 


Urine Bilirubin  Negative  (Negative)  


 


Urine Urobilinogen  0.2  (0.2-1.0)  


 


Ur Leukocyte Esterase  Negative  (Negative)  


 


Urine RBC  0-5  (0-5)  /hpf


 


Urine WBC  0-5  (0-5)  /hpf


 


Ur Epithelial Cells  0-5  (0-5)  /hpf


 


Urine Bacteria  Rare  (FEW)  /hpf


 


Urine Mucus  Not seen  (FEW)  /hpf











Meds: 


Medications














Discontinued Medications














Generic Name Dose Route Start Last Admin





  Trade Name Toddq  PRN Reason Stop Dose Admin


 


Lidocaine HCl  10 ml  18 02:08  





  Xylocaine 2% Jelly  MUCMEM  18 02:09  





  ONETIME ONE   





     





     





     





     














- Re-Assessments/Exams


Free Text/Narrative Re-Assessment/Exam: 





18 01:53


The patient feels that she is having difficulty voiding. We will obtain a clean 

catch urinalysis, with the patient urinating as much as possible, then check a 

bladder scan for post-void residual.








18 01:55


Notified by Adwoa LOPEZ that the patient wants pain medication. The patient was 

sleeping when I initially saw her. I do not see an indication for pain 

medication.








18 02:08


Notified by Adwoa LOPEZ that the patient urinated approximately 60 mL of urine. A 

sample has been sent for urinalysis. A post-void bladder scan revealed 

approximately 320 mL residual urine. I have ordered a Urojet and Navarrete catheter 

to leg bag.








18 02:36


The patient's urinalysis is normal. She does not have a UTI.





Notified by Adwoa LOPEZ that the patient refused the Navarrete catheter, stating that 

she thought it would be too painful, despite my having ordered a Urojet.





The literature recommends placement of a Navarrete catheter for urinary retention 

over 450 mL. I was trained that the cutoff is 150 mL, although that value is 

more for comfort than danger to the kidneys. Since the patient's urinary 

retention is 320 mL, I'm not pressing the issue if she does not want a Navarrete 

catheter. I am, however, recommending that she follow-up with her Gynecologist, 

EMETERIO Holt.





The patient requested a refill of her baclofen. I explained that the ED does 

not refill medications. She will need to obtain a refill per Dr. Carlos.





The patient was tearful at the time of discharge, despite no change in her 

condition from when she was sleeping when I originally saw her. Her report to 

me that nothing had been done on a prior visit for similar symptoms when 

clearly an extensive workup had been done, her denial of prior substance abuse (

per Dr. Carlos, and the patient's UDS was positive on 2018), her report of 

Toradol as an allergy, her request for pain medication simply to void, then her 

refusal of a Navarrete catheter, a reasonable treatment for her condition, and now 

her tearfulness despite her earlier sleeping, all suggest to me that the 

patient is drug-seeking.





Departure





- Departure


Time of Disposition: 02:39


Disposition: Home, Self-Care 01


Condition: Fair


Clinical Impression: 


 Urinary retention, Drug-seeking behavior








- Discharge Information


Instructions:  Acute Urinary Retention, Female


Referrals: 


Nolan Driscoll MD [Primary Care Provider] - 


Isaac Bolton MD [Ordering Only Provider] - 


Forms:  ED Department Discharge


Additional Instructions: 


You were seen in the emergency room for difficulty passing urine.





Workup in the ER included a urinalysis and post-void bladder scan.





Your urinalysis was normal. You do not have a urinary tract infection. 





Your post-void residual was 320 mL. A Navarrete catheter was recommended, but 

declined. 





We recommend that you follow-up with your Gynecologist, Dr. Bolton, at the 

next available appointment, for further evaluation. 





A refill of your baclofen was requested. Unfortunately, emergency rooms should 

not refill medications. Your baclofen should be prescribed by a single 

prescriber, Dr. Carlos. 





If any other problems, please do not hesitate to return to the ER. 





- My Orders


Last 24 Hours: 


My Active Orders





18 02:02


UA W/MICROSCOPIC [URIN] Stat 





18 02:07


Urinary Catheter Assessment [RC] ASDIRECTED 





18 02:15


Navarrete Catheter Insertion [Insert Urinary Catheter] [OM.PC] Q24H 














- Assessment/Plan


Last 24 Hours: 


My Active Orders





18 02:02


UA W/MICROSCOPIC [URIN] Stat 





18 02:07


Urinary Catheter Assessment [RC] ASDIRECTED 





18 02:15


Navarrete Catheter Insertion [Insert Urinary Catheter] [OM.PC] Q24H show

## 2022-05-23 LAB
A1C WITH ESTIMATED AVERAGE GLUCOSE RESULT: 5.6 % — SIGNIFICANT CHANGE UP (ref 4–5.6)
ANION GAP SERPL CALC-SCNC: 12 MMOL/L — SIGNIFICANT CHANGE UP (ref 7–14)
BASOPHILS # BLD AUTO: 0.07 K/UL — SIGNIFICANT CHANGE UP (ref 0–0.2)
BASOPHILS NFR BLD AUTO: 0.7 % — SIGNIFICANT CHANGE UP (ref 0–1)
BUN SERPL-MCNC: 15 MG/DL — SIGNIFICANT CHANGE UP (ref 10–20)
CALCIUM SERPL-MCNC: 9.6 MG/DL — SIGNIFICANT CHANGE UP (ref 8.5–10.1)
CHLORIDE SERPL-SCNC: 102 MMOL/L — SIGNIFICANT CHANGE UP (ref 98–110)
CO2 SERPL-SCNC: 24 MMOL/L — SIGNIFICANT CHANGE UP (ref 17–32)
CREAT SERPL-MCNC: 0.7 MG/DL — SIGNIFICANT CHANGE UP (ref 0.7–1.5)
EGFR: 117 ML/MIN/1.73M2 — SIGNIFICANT CHANGE UP
EOSINOPHIL # BLD AUTO: 0.34 K/UL — SIGNIFICANT CHANGE UP (ref 0–0.7)
EOSINOPHIL NFR BLD AUTO: 3.4 % — SIGNIFICANT CHANGE UP (ref 0–8)
ESTIMATED AVERAGE GLUCOSE: 114 MG/DL — SIGNIFICANT CHANGE UP (ref 68–114)
GLUCOSE SERPL-MCNC: 97 MG/DL — SIGNIFICANT CHANGE UP (ref 70–99)
HCT VFR BLD CALC: 36.4 % — LOW (ref 37–47)
HGB BLD-MCNC: 11.8 G/DL — LOW (ref 12–16)
IMM GRANULOCYTES NFR BLD AUTO: 0.2 % — SIGNIFICANT CHANGE UP (ref 0.1–0.3)
LYMPHOCYTES # BLD AUTO: 2.55 K/UL — SIGNIFICANT CHANGE UP (ref 1.2–3.4)
LYMPHOCYTES # BLD AUTO: 25.8 % — SIGNIFICANT CHANGE UP (ref 20.5–51.1)
MAGNESIUM SERPL-MCNC: 1.8 MG/DL — SIGNIFICANT CHANGE UP (ref 1.8–2.4)
MCHC RBC-ENTMCNC: 25.9 PG — LOW (ref 27–31)
MCHC RBC-ENTMCNC: 32.4 G/DL — SIGNIFICANT CHANGE UP (ref 32–37)
MCV RBC AUTO: 80 FL — LOW (ref 81–99)
MONOCYTES # BLD AUTO: 0.64 K/UL — HIGH (ref 0.1–0.6)
MONOCYTES NFR BLD AUTO: 6.5 % — SIGNIFICANT CHANGE UP (ref 1.7–9.3)
NEUTROPHILS # BLD AUTO: 6.28 K/UL — SIGNIFICANT CHANGE UP (ref 1.4–6.5)
NEUTROPHILS NFR BLD AUTO: 63.4 % — SIGNIFICANT CHANGE UP (ref 42.2–75.2)
NRBC # BLD: 0 /100 WBCS — SIGNIFICANT CHANGE UP (ref 0–0)
PLATELET # BLD AUTO: 436 K/UL — HIGH (ref 130–400)
POTASSIUM SERPL-MCNC: 4.5 MMOL/L — SIGNIFICANT CHANGE UP (ref 3.5–5)
POTASSIUM SERPL-SCNC: 4.5 MMOL/L — SIGNIFICANT CHANGE UP (ref 3.5–5)
RBC # BLD: 4.55 M/UL — SIGNIFICANT CHANGE UP (ref 4.2–5.4)
RBC # FLD: 13.7 % — SIGNIFICANT CHANGE UP (ref 11.5–14.5)
SODIUM SERPL-SCNC: 138 MMOL/L — SIGNIFICANT CHANGE UP (ref 135–146)
WBC # BLD: 9.9 K/UL — SIGNIFICANT CHANGE UP (ref 4.8–10.8)
WBC # FLD AUTO: 9.9 K/UL — SIGNIFICANT CHANGE UP (ref 4.8–10.8)

## 2022-05-23 PROCEDURE — 99233 SBSQ HOSP IP/OBS HIGH 50: CPT

## 2022-05-23 PROCEDURE — 95819 EEG AWAKE AND ASLEEP: CPT | Mod: 26

## 2022-05-23 RX ORDER — OXYCODONE AND ACETAMINOPHEN 5; 325 MG/1; MG/1
1 TABLET ORAL ONCE
Refills: 0 | Status: DISCONTINUED | OUTPATIENT
Start: 2022-05-23 | End: 2022-05-23

## 2022-05-23 RX ADMIN — Medication 650 MILLIGRAM(S): at 04:01

## 2022-05-23 RX ADMIN — OXYCODONE AND ACETAMINOPHEN 1 TABLET(S): 5; 325 TABLET ORAL at 06:37

## 2022-05-23 RX ADMIN — NORTRIPTYLINE HYDROCHLORIDE 10 MILLIGRAM(S): 10 CAPSULE ORAL at 21:03

## 2022-05-23 RX ADMIN — Medication 650 MILLIGRAM(S): at 15:37

## 2022-05-23 NOTE — PATIENT PROFILE ADULT - FALL HARM RISK - HARM RISK INTERVENTIONS

## 2022-05-24 ENCOUNTER — TRANSCRIPTION ENCOUNTER (OUTPATIENT)
Age: 32
End: 2022-05-24

## 2022-05-24 VITALS
SYSTOLIC BLOOD PRESSURE: 114 MMHG | OXYGEN SATURATION: 98 % | DIASTOLIC BLOOD PRESSURE: 72 MMHG | HEART RATE: 88 BPM | RESPIRATION RATE: 18 BRPM

## 2022-05-24 PROCEDURE — 99233 SBSQ HOSP IP/OBS HIGH 50: CPT

## 2022-05-24 PROCEDURE — 99222 1ST HOSP IP/OBS MODERATE 55: CPT

## 2022-05-24 PROCEDURE — 70553 MRI BRAIN STEM W/O & W/DYE: CPT | Mod: 26

## 2022-05-24 RX ORDER — UBROGEPANT 100 MG/1
1 TABLET ORAL
Qty: 30 | Refills: 0
Start: 2022-05-24 | End: 2022-06-22

## 2022-05-24 RX ORDER — DIVALPROEX SODIUM 500 MG/1
1 TABLET, DELAYED RELEASE ORAL
Qty: 0 | Refills: 0 | DISCHARGE
Start: 2022-05-24 | End: 2022-08-21

## 2022-05-24 RX ORDER — DIVALPROEX SODIUM 500 MG/1
1 TABLET, DELAYED RELEASE ORAL
Qty: 90 | Refills: 0
Start: 2022-05-24 | End: 2022-08-21

## 2022-05-24 RX ORDER — VALPROIC ACID (AS SODIUM SALT) 250 MG/5ML
1000 SOLUTION, ORAL ORAL ONCE
Refills: 0 | Status: COMPLETED | OUTPATIENT
Start: 2022-05-24 | End: 2022-05-24

## 2022-05-24 RX ORDER — METOCLOPRAMIDE HCL 10 MG
10 TABLET ORAL ONCE
Refills: 0 | Status: COMPLETED | OUTPATIENT
Start: 2022-05-24 | End: 2022-05-24

## 2022-05-24 RX ORDER — MAGNESIUM SULFATE 500 MG/ML
1 VIAL (ML) INJECTION ONCE
Refills: 0 | Status: COMPLETED | OUTPATIENT
Start: 2022-05-24 | End: 2022-05-24

## 2022-05-24 RX ADMIN — Medication 650 MILLIGRAM(S): at 10:14

## 2022-05-24 RX ADMIN — Medication 60 MILLIGRAM(S): at 10:20

## 2022-05-24 RX ADMIN — Medication 650 MILLIGRAM(S): at 02:30

## 2022-05-24 RX ADMIN — Medication 10 MILLIGRAM(S): at 09:19

## 2022-05-24 RX ADMIN — Medication 650 MILLIGRAM(S): at 01:46

## 2022-05-24 RX ADMIN — Medication 100 GRAM(S): at 09:55

## 2022-05-24 RX ADMIN — Medication 650 MILLIGRAM(S): at 10:20

## 2022-05-24 NOTE — CONSULT NOTE ADULT - SUBJECTIVE AND OBJECTIVE BOX
**STROKE HPI***    HPI: 31y Female with PMHx of ICH one month ago, presenting with new onset of abnormal vision. Pt states that she was left with residual peripheral visual issues. Tonight at around 11pm she started to see flashing light on the Lt side of her vision. She denied any slurred speech, double vision, weakness, numbness, imbalance, vertigo, headache, neck pain, recent neck or head trauma, LOC, seizures or any abnormal movements.   The patient was recently discharge after admission for an ICH that was investigated but no etiology was found. Including MRV, DSA.       PAST MEDICAL & SURGICAL HISTORY:  Bicornuate uterus      Migraine headache      H/O unilateral salpingectomy          FAMILY HISTORY:  Family history of systemic lupus erythematosus (SLE) in mother (Mother)    Family history of hemophilia A (Sibling)    Family history of antiphospholipid syndrome (Mother)        SOCIAL HISTORY:   Patient lives with her  and daughter   Smoking status: not a smoker   Drinking: not alcoholic  Drug Use: no drug use     ROS:   Constitutional: No fever, weight loss or fatigue  Eyes: No eye pain, visual disturbances, or discharge  ENMT:  No difficulty hearing, tinnitus, vertigo; No sinus or throat pain  Neck: No pain or stiffness  Respiratory: No cough, wheezing, chills or hemoptysis  Cardiovascular: No chest pain, palpitations, shortness of breath, dizziness or leg swelling  Gastrointestinal: No abdominal pain. No nausea, vomiting or hematemesis; No diarrhea or constipation. Nohematochezia.  Genitourinary: No dysuria, frequency, hematuria or incontinence  Neurological: As per HPI  Skin: No itching, burning, rashes or lesions   Endocrine: No heat or cold intolerance; No hair loss  Musculoskeletal: No joint pain or swelling; No muscle, back or extremity pain  Psychiatric: No depression, anxiety, mood swings or difficulty sleeping  Heme/Lymph: No easy bruising or bleeding gums    T(C): 36.2 (05-21-22 @ 23:39), Max: 36.2 (05-21-22 @ 23:39)  HR: 111 (05-21-22 @ 23:39) (111 - 111)  BP: 136/87 (05-21-22 @ 23:39) (136/87 - 136/87)  RR: 18 (05-21-22 @ 23:39) (18 - 18)  SpO2: 99% (05-21-22 @ 23:39) (99% - 99%)    MEDICATION RECONCILIATION   MEDICATIONS  (STANDING):    MEDICATIONS  (PRN):    Allergies    No Known Allergies    Intolerances      Vital Signs Last 24 Hrs  T(C): 36.2 (21 May 2022 23:39), Max: 36.2 (21 May 2022 23:39)  T(F): 97.1 (21 May 2022 23:39), Max: 97.1 (21 May 2022 23:39)  HR: 111 (21 May 2022 23:39) (111 - 111)  BP: 136/87 (21 May 2022 23:39) (136/87 - 136/87)  BP(mean): --  RR: 18 (21 May 2022 23:39) (18 - 18)  SpO2: 99% (21 May 2022 23:39) (99% - 99%)    Physical exam:  General: No acute distress, awake and alert  Cardiovascular: Regular rate and rhythm, no murmurs, rubs, or gallops. No bruits  Pulmonary: Anterior breath sounds clear bilaterally, no crackles or wheezing. No use of accessory muscles  GI: Abdomen soft, non-tender, non-distended    Neurologic:  -Mental status: Awake, alert, oriented to person, place, and time. Speech is fluent with intact naming, repetition, and comprehension, no dysarthria. Recent and remote memory intact. Follows commands. Attention/concentration intact.  -Cranial nerves:   II: Lt lower quadrantanopia   III, IV, VI: Extraocular movements are intact without nystagmus. Pupils equally round and reactive to light  V:  Facial sensation V1-V3 equal and intact   VII: Face is symmetric with normal eye closure and smile  VIII: Hearing is bilaterally intact to finger rub  IX, X: Uvula is midline and soft palate rises symmetrically  XI: Head turning and shoulder shrug are intact.  XII: Tongue protrudes midline  Motor: Normal bulk and tone. No pronator drift. Strength bilateral upper extremity 5/5, bilateral lower extremities 5/5.  Rapid alternating movements intact and symmetric  Sensation: Intact to light touch bilaterally. No neglect or extinction on double simultaneous testing.  Coordination: No dysmetria on finger-to-nose and heel-to-shin bilaterally  Reflexes: Downgoing toes bilaterally   Gait: Narrow gait and steady    NIHSS: 1    Fingerstick Blood Glucose: CAPILLARY BLOOD GLUCOSE  108 (22 May 2022 00:56)      POCT Blood Glucose.: 108 mg/dL (21 May 2022 23:46)    LABS:                        12.6   11.21 )-----------( 466      ( 22 May 2022 00:00 )             38.1     05-22    140  |  103  |  14  ----------------------------<  99  3.9   |  27  |  0.7    Ca    9.9      22 May 2022 00:00    TPro  7.4  /  Alb  4.6  /  TBili  <0.2  /  DBili  x   /  AST  20  /  ALT  29  /  AlkPhos  113  05-22    PT/INR - ( 22 May 2022 00:00 )   PT: 12.20 sec;   INR: 1.06 ratio         PTT - ( 22 May 2022 00:00 )  PTT:32.7 sec  CARDIAC MARKERS ( 22 May 2022 00:00 )  x     / <0.01 ng/mL / x     / x     / x              RADIOLOGY & ADDITIONAL STUDIES:    HCT:     CTA:    -----------------------------------------------------------------------------------------    
ISREAL DOWELL  31y  Female    Patient is a 31y old  Female who presents with a chief complaint of vision changes (22 May 2022 02:14)      HPI:  32 yo F with PMHx of Migraines, Intracranial Hemorrhage one month ago presents with vision changes. Pt states that she began to see bilateral floaters, which began about 1 hour prior to presentation, prompting her to come to ED. Sx associated with mild right sided headache. Prior to initiation of sx, pt felt well and was at the beach. Pt was admitted one month ago when she developed visual disturbances and headache and was found to have intracranial hemorrhage.   In the ED, T 97, /87, , RR 18, SpO2 99% on RA. Code stroke called, NIHSS 1 for partial hemianopia. CT Head revealed increased hypoattenuation, possibly white matter edema vs underlying ischemia. (22 May 2022 02:14)    S: Patient was examined and seen at bedside. This morning pt is resting comfortably in bed and reports no new issues or overnight events. No complaints, feels better  Denies CP, SOB, N/V/D/C/AP, cough, F, chills, dizziness, new focal weakness, HA, vision changes, dysuria, or urinary symptoms, blood in stool.  ROS: all other systems reviewed and are negative    PAST MEDICAL & SURGICAL HISTORY:  Bicornuate uterus      Migraine headache      H/O unilateral salpingectomy        SOCIAL HISTORY:  Tobacco: denies  Illicit drugs: denies  Alcohol: social  Family history reviewed and otherwise non-contributory  ALLERGIES: NKDA    MEDICATIONS  (STANDING):  nortriptyline 10 milliGRAM(s) Oral at bedtime  valproate sodium  IVPB 1000 milliGRAM(s) IV Intermittent once    MEDICATIONS  (PRN):  acetaminophen     Tablet .. 650 milliGRAM(s) Oral every 6 hours PRN Temp greater or equal to 38C (100.4F), Mild Pain (1 - 3)  LORazepam   Injectable 2 milliGRAM(s) IV Push once PRN for generalized tonic clonic seizure > 2 min    Home Medications:          Vital Signs Last 24 Hrs  T(C): 36 (24 May 2022 03:54), Max: 36.2 (23 May 2022 20:06)  T(F): 96.8 (24 May 2022 03:54), Max: 97.2 (23 May 2022 20:06)  HR: 83 (24 May 2022 07:34) (76 - 100)  BP: 91/58 (24 May 2022 07:34) (91/58 - 129/84)  BP(mean): 70 (24 May 2022 07:34) (70 - 88)  RR: 16 (24 May 2022 07:34) (16 - 18)  SpO2: 98% (24 May 2022 07:34) (95% - 99%)  CAPILLARY BLOOD GLUCOSE          General: NAD. Looks stated age.  HEENT: clean oropharynx, EOMI, no LAD; Lt lower quadrantanopia   Neck: trachea midline, no thyromegaly  CV: nl S1 S2; no m/r/g  Resp: decreased breath sounds at base  GI: NT/ND/S +BS  MS: no clubbing/cyanosis/edema, +pulses  Neuro: motor, sensory intact; + reflexes  Skin: no rashes, nl turgor  Psychiatric: AA0x3 w/ intact insight and judgement    tele: SR, nonspecific changes (on my own evaluation of tele monitor)    LABS:                        11.8   9.90  )-----------( 436      ( 23 May 2022 07:48 )             36.4     05-23    138  |  102  |  15  ----------------------------<  97  4.5   |  24  |  0.7    Ca    9.6      23 May 2022 07:48  Mg     1.8     05-23                      EKG - SR, nonspecific changes (my read)  Chart and consultant noted personally reviewed.  Care Discussed with Consultants/Other Providers/ Housestaff [ x] YES [ ] NO   Radiology, labs, old records personally reviewed.

## 2022-05-24 NOTE — DISCHARGE NOTE PROVIDER - NSDCCPCAREPLAN_GEN_ALL_CORE_FT
PRINCIPAL DISCHARGE DIAGNOSIS  Diagnosis: Migraine with visual aura  Assessment and Plan of Treatment: Migraines are a kind of headache that can also involve other symptoms. Migraines can affect both adults and children. They are more common in women than in men. Migraines often start off mild and then get worse.  Symptoms can include:  ?Headache – The headache gets worse over several hours and is usually throbbing. It often affects 1 side of the head.  ?Nausea and sometimes vomiting  ?Feeling sensitive to light and noise – Lying down in a quiet, dark room often helps.  ?Aura – Some people have something called a migraine "aura." An aura is a symptom or feeling that happens before or during the migraine headache. Each person's aura is different, but in most cases the aura affects the vision. You might see flashing lights, bright spots, or zig-zag lines, or lose part of your vision. Or you might have numbness and tingling of the lips, lower face, and fingers of 1 hand. Some people hear sounds or have ringing in their ears as part of their aura. The aura usually lasts a few minutes to an hour and then goes away, but most often lasts 15 to 30 minutes.  Women who get migraines with aura usually cannot take birth control pills. That's because they might increase the risk of stroke.  Many people get other symptoms of migraine that happen several hours or even a day before the headache. Doctors call these "premonitory" or "prodromal" symptoms. They might include yawning, feeling depressed, irritability, food cravings, constipation, or a stiff neck.  Common migraine triggers include:  ?Stress  ?Hormonal changes   ?Skipping meals or not eating enough   ?Changes in the weather  ?Sleeping too much or too little  ?Bright or flashing lights  Drinking alcohol  ?Certain drinks or foods, such as red wine, aged cheese, and hot dogs  For treatment please take the prescribed medication        SECONDARY DISCHARGE DIAGNOSES  Diagnosis: ICH (intracerebral hemorrhage)  Assessment and Plan of Treatment: During this hospital admission, you had a hemorrhagic stroke. During an hemorrhagic stroke, blood leaks out of your blood vessels into your brain because of a break in the blood vessel wall. This can damage areas in the brain that control other parts of the body. The blood will get absorbed back into your body over time like a bruise.   Doing your regular tasks may be difficult after you've had a stroke, but you can learn new ways to manage your daily activities. In fact, doing daily activities may help you to regain muscle strength. Be patient, give yourself time to adjust, and appreciate the progress you make. When showering or bathing, test the water temperature with a hand or foot that was not affected by the stroke. Use grab bars, a shower seat, a hand-held showerhead, and a long-handled brush. For dressing, dress while sitting, starting with the affected side or limb. Wear shirts that pull easily over your head and pants or skirts with elastic waistbands. Use zippers with loops attached to the pull tabs. You will learn additional new ways to manage after a stroke in rehabilitation. It is normal to feel fatigue after a stroke, while some days may be worse than others, you will continue to improve.  Call 911 right away if you have any of the following symptoms of another stroke:  B: Balance: Sudden: Dizziness, loss of balance, or a sense of falling, difficulty with coordinating movement  E: Eyes: Sudden double vision or trouble seeing in one or both eyes  F: Face: Sudden uneven face  A: Arms (Legs): Sudden weakness, tingling, or loss of feeling on one side of your face or body  S: Speech: Sudden trouble talking or slurred speech, sudden difficulty understanding others  T: Time: Please call 911 right away and go to the emergency room  •Sudden, severe headache  •Blackouts or seizures

## 2022-05-24 NOTE — PROGRESS NOTE ADULT - SUBJECTIVE AND OBJECTIVE BOX
Neurology Stroke Progress Note    INTERVAL HPI/OVERNIGHT EVENTS:  Patient seen and examined at the bedside. No issues overnight. The patient is complaining of headache this morning 5-6/10 on the Rt side. The headache is associated with photophobia but no nausea or vomiting. She is known to have migraine and this headache was similar. She used to have her migraine twice per week, and use Imitrex for that; however, she was not able to use it since her bleed last month. She also was recently started on amitriptyline     MEDICATIONS  (STANDING):  nortriptyline 10 milliGRAM(s) Oral at bedtime    MEDICATIONS  (PRN):  acetaminophen     Tablet .. 650 milliGRAM(s) Oral every 6 hours PRN Temp greater or equal to 38C (100.4F), Mild Pain (1 - 3)  LORazepam   Injectable 2 milliGRAM(s) IV Push once PRN for generalized tonic clonic seizure > 2 min      Allergies    No Known Allergies    Intolerances        Vital Signs Last 24 Hrs  T(C): 36 (24 May 2022 03:54), Max: 36.2 (23 May 2022 20:06)  T(F): 96.8 (24 May 2022 03:54), Max: 97.2 (23 May 2022 20:06)  HR: 83 (24 May 2022 07:34) (76 - 100)  BP: 91/58 (24 May 2022 07:34) (91/58 - 129/84)  BP(mean): 70 (24 May 2022 07:34) (70 - 88)  RR: 16 (24 May 2022 07:34) (16 - 18)  SpO2: 98% (24 May 2022 07:34) (95% - 99%)    Physical exam:  General: No acute distress, awake and alert  Eyes: Anicteric sclerae, moist conjunctivae, see below for CNs  Neck: trachea midline, FROM, supple, no thyromegaly or lymphadenopathy  Cardiovascular: Regular rate and rhythm, no murmurs, rubs, or gallops. No carotid bruits.   Pulmonary: Anterior breath sounds clear bilaterally, no crackles or wheezing. No use of accessory muscles  GI: Abdomen soft, non-distended, non-tender  Extremities: Radial and DP pulses +2, no edema    Neurologic:  -Mental status: Awake, alert, oriented to person, place, and time. Speech is fluent with intact naming, repetition, and comprehension, no dysarthria. Recent and remote memory intact. Follows commands. Attention/concentration intact.  -Cranial nerves:   II: Lt lower quadrantanopia  III, IV, VI: Extraocular movements are intact without nystagmus. Pupils equally round and reactive to light  V:  Facial sensation V1-V3 equal and intact   VII: Face is symmetric with normal eye closure and smile  VIII: Hearing is bilaterally intact to finger rub  IX, X: Uvula is midline and soft palate rises symmetrically  XI: Head turning and shoulder shrug are intact.  XII: Tongue protrudes midline  Motor: Normal bulk and tone. No pronator drift. Strength bilateral upper extremity 5/5, bilateral lower extremities 5/5.  Rapid alternating movements intact and symmetric  Sensation: Intact to light touch bilaterally. No neglect or extinction on double simultaneous testing.  Coordination: No dysmetria on finger-to-nose and heel-to-shin bilaterally  Reflexes: Downgoing toes bilaterally   Gait: Narrow gait and steady      NIHSS: 1      LABS:                        11.8   9.90  )-----------( 436      ( 23 May 2022 07:48 )             36.4     05-23    138  |  102  |  15  ----------------------------<  97  4.5   |  24  |  0.7    Ca    9.6      23 May 2022 07:48  Mg     1.8     05-23            RADIOLOGY & ADDITIONAL TESTS:

## 2022-05-24 NOTE — CONSULT NOTE ADULT - ASSESSMENT
31y Female w/ PMH ICH. HCT showed hypodensity in the area of previous ICH. The patient symptoms could be related to seizure activity from that area, or less likely to be related to a new ischemic stroke in that location. There are no signs of a new ICH    R/o Seizure vs ischemic stroke vs complicated migraine  - stroke unit   - MR brain w/wo   - EEG   - Ativan 2mg IV for GTCs > 2 min only  - Neurological assessment Q8hrs  - Seizure & Fall precautions  - Maintain Mg> 2 mmol/l  - Bp goals 120-160 mmHg   - Neuro checks Q4H   - Vitals Q4H      #H/o migraines  -cont home meds    #Obesity  -wt loss advised    DVT Px w/ Lovenox, SCDs    #Progress Note Handoff  Pending: Consults____Clinical improvement and stability__x___Tests_MRI_____PT____x____  Pt/Family discussion: Pt informed and agrees with the current plan  Disposition: Home______/SNF_______/4A______/To be determined____x____    My note supersedes the residents note should a discrepancy arise.    Chart and notes personally reviewed.  Care Discussed with Consultants/Other Providers/ Housestaff [ x] YES [ ] NO   Radiology, labs, old records personally reviewed.    discussed w/ housestaff, nursing, case management, neuro team     31y Female w/ PMH ICH. HCT showed hypodensity in the area of previous ICH. The patient symptoms could be related to seizure activity from that area, or less likely to be related to a new ischemic stroke in that location. There are no signs of a new ICH    R/o Seizure vs ischemic stroke vs complicated migraine  - stroke unit   - MR brain w/wo prelim negative for acute change  - EEG negative  - Ativan 2mg IV for GTCs > 2 min only  - Neurological assessment Q8hrs  - Seizure & Fall precautions  - Maintain Mg> 2 mmol/l  - Bp goals 120-160 mmHg   - Neuro checks Q4H   - Vitals Q4H   - neuro planning to start pt on VA acid on d/c     #H/o migraines  -cont home meds    #Obesity  -wt loss advised    #PCOS  -outpt f/u w/ GYN    DVT Px w/ Lovenox, SCDs    #Progress Note Handoff  Pending: Consults____Clinical improvement and stability__x___Tests_MRI_official read____PT____x____  Pt/Family discussion: Pt informed and agrees with the current plan  Disposition: Home______/SNF_______/4A______/To be determined____x____    My note supersedes the residents note should a discrepancy arise.    Chart and notes personally reviewed.  Care Discussed with Consultants/Other Providers/ Housestaff [ x] YES [ ] NO   Radiology, labs, old records personally reviewed.    discussed w/ housestaff, nursing, case management, neuro team

## 2022-05-24 NOTE — DISCHARGE NOTE PROVIDER - HOSPITAL COURSE
Hospital course:  31y Female with PMH of ICH recent, and migraine presenting with seeing flickering lights and headache.      During this hospital course, CTH was done and showed no new bleed, MR head done showed residual bleed from last scan.   The patient had routine EEG that was normal.   The current working diagnosis is migraine with aura; however, seizure could not be r/u  The patient received 1000mg valproate sodium IV, and Magnesium sulfate 1000 mg IV, Metoclopramide 10 mg IV with good resolution of her headache     Patient had the following workup done in house:  CT Head: Hypoattenuation conforming to the size and site of prior right parieto-occipital intraparenchymal hematoma, without evidence for new/acute/hyperdense intracranial hemorrhage. However compared to prior study there is increased surrounding hypoattenuation which appears to spare portions of the cortex, favored to reflect white matter edema   although underlying ischemia is not excluded. Recommend further evaluation with MRI of the brain.  MR Head Non Contrast: The right occipital lobe parenchymal hematoma has slightly decreased in size (measuring 2.8 cm, previously 3 cm). The surrounding edema and mass effect has increased. Recommend follow-up imaging to demonstrate complete resolution. No new intracranial hemorrhage or infarct demonstrated.      Physical exam at discharge: On the day of discharge, the patient was seen and examined. Symptoms improved. Vital signs are stable. Labs and imaging reviewed. Patient is medically optimized and hemodynamically stable. Return precautions discussed, medication teach back done, and importance of physician followup emphasized. The patient verbalized understanding.      New medications on discharge: Valproate  mg daily for migraine prophylaxis, and Ubrogepant (Ubrelvy) 50 to 100 mg as a single dose; if symptoms persist or return, may repeat dose after =2 hours. Maximum: 200 mg per 24 hours  Imaging to be done as outpatient: repeat MR head as outpatient   Further outpatient workup: ambulatory EEG

## 2022-05-24 NOTE — DISCHARGE NOTE PROVIDER - NSDCFUSCHEDAPPT_GEN_ALL_CORE_FT
Quentin Castro  [facility]  NEUROLOGY [address]  Scheduled Appointment: 05/25/2022    Cari Nicholas  [facility]  Endocrin [address]  Scheduled Appointment: 06/07/2022

## 2022-05-24 NOTE — CONSULT NOTE ADULT - TIME BILLING
Review of imaging and chart; obtaining history; examination of pt; discussion and coordination of care.
time spent on review of labs, imaging studies, old records, obtaining history, personally examining patient, counselling and communicating with patient/ family, entering orders for medications/tests/etc, discussions with other health care providers, documentation in electronic health records, independent interpretation of labs, imaging/procedure results and care coordination.

## 2022-05-24 NOTE — DISCHARGE NOTE PROVIDER - CARE PROVIDER_API CALL
Shoaib Garcia)  EEGEpilepsy; Neurology  [address] [place] [address], [address]  [place] [address]  Phone: [phone]  Fax: [phone]  Follow Up Time: 1 month

## 2022-05-24 NOTE — DISCHARGE NOTE NURSING/CASE MANAGEMENT/SOCIAL WORK - PATIENT PORTAL LINK FT
You can access the FollowMyHealth Patient Portal offered by [facility] by registering at the following website: [email or web address]. By joining "[employer]"’s FollowMyHealth portal, you will also be able to view your health information using other applications (apps) compatible with our system.

## 2022-05-24 NOTE — DISCHARGE NOTE PROVIDER - NSDCMRMEDTOKEN_GEN_ALL_CORE_FT
Depakote 250 mg oral delayed release tablet: 1 tab(s) orally once a day   nortriptyline 10 mg oral capsule: 1 cap(s) orally once a day  ubrogepant 50 mg oral tablet: 1 tab(s) orally prn MDD:200mg

## 2022-05-24 NOTE — PROGRESS NOTE ADULT - ASSESSMENT
31y Female w/ PMH ICH. HCT showed hypodensity in the area of previous ICH. The patient symptoms could be related to seizure activity from that area, or less likely to be related to a new ischemic stroke in that location. There are no signs of a new ICH  MR was done and showed her old bleed in the late stages (bright on both T1 and T2), no acute stroke   EEG was normal   The patient is known to have migraine, and currently her presentation with headache and flickering lights, with no acute stroke on MR, normal EEG would suggest her having acute stroke.     Neuro  #Stroke workup  - MR as above  - Normal EEG   - Most likely migraine related     #Headache:  Consitent with migraine  - 1000 mg Valproate IV  - 1000 mg Mg sulfate IV  - 10 mg Metoclopramide IV  - Continue Nortriptyline 10 mg daily   - She can not take triptans     Cards  #  - Normal Bp goal      #HLD  - high dose statin as above in CVA  - LDL results: 154    Pulm  - call provider if SPO2 < 94%    GI  #Nutrition/Fluids/Electrolytes   - replete K<4 and Mg <2  - Diet: regular     Renal  - Daily BMP       Endocrine  # A1C results: 5.6      - TSH results: pending     DVT Prophylaxis  - SCDs for DVT prophylaxis       IDR Goals: Goals reviewed at interdisciplinary rounds with case management, social work, physical therapy, occupational therapy, and speech language pathology.   Please see specific therapy  notes for in depth goals.  Dispo: Home      Discussed daily hospital plans and goals with patient and family at bedside. (Called and updated family.)    Discussed with Neurology Attending

## 2022-05-24 NOTE — DISCHARGE NOTE NURSING/CASE MANAGEMENT/SOCIAL WORK - NSDCPEFALRISK_GEN_ALL_CORE
For information on Fall & Injury Prevention, visit: [email or web address] OR  [email or web address] OR  https://www.cdc.gov/steadi/patient.html

## 2022-05-25 ENCOUNTER — APPOINTMENT (OUTPATIENT)
Dept: NEUROLOGY | Facility: CLINIC | Age: 32
End: 2022-05-25
Payer: MEDICAID

## 2022-05-25 PROCEDURE — 99215 OFFICE O/P EST HI 40 MIN: CPT

## 2022-05-25 NOTE — HISTORY OF PRESENT ILLNESS
[FreeTextEntry1] : Patient is 30 yo RH woman, never smoker, with FMHx of Hemophilia A and PMHx of migraines formerly on Imitrex (had seen Dr. Mendoza for migraines), hx of insomnia, pre-eclampsia (180/80) requiring induction/ at [facility] without any neurological issues (2018) who presents as HFU from 22 for R occipital intraparenchymal hemorrhage s/p diagnostic cerebral DSA with Dr. Castro, ddx ?RCVS from Imitrex overuse. \par \par On 22 she had some baseline migraines, took Imitrex. Then at 3AM developed sharp, sudden, unusual pain on R side of head. She took Imitrex, pain mildly improved. Then developed vision changes described as seeing more light and went to [facility] ED. CTH revealed 3.2 x 1.7 cm acute R occipital intraparenchymal hemorrhage. MR Brain w/wo same afternoon reported stable hematoma, ?venous angioma. She was started on Keppra. She was transferred to [facility] via EMS per family request. \par \par CTV was NEGATIVE  \par MRI BRAIN: Redemonstrated right occipital intraparenchymal hemorrhage with mild surrounding edema and mass effect. No evidence of underlying mass or vascular malformation. \par MRA H: Unremarkable. \par MRV H: Unremarkable. \par S/p Diagnostic cerebral angiogram on 22, Report is not available.  \par Ddx RCVS, vasculitis, venous thrombosis, vs less likely underlying mass/tumor\par \par Daughter has single RV atresia. \par FMHx of mother with RA, Lupus, antiphospholipid positive\par \par Autoimmune panel: \par PHANI 1:320, speckled \par ESR 68, CRP 17 \par \par Coag panel: \par Factor  \par PT 13.2\par \par Today, she states that last two HAs she had s/p d/c, Tylenol was helpful. She says she has L sided VF cut and scheduled for Neuro-ophtho and vision OT. Patient denies erythema, edema, pain or ecchymosis of Right groin access site and has no discomfort in the RLE.\par \par

## 2022-05-25 NOTE — HISTORY OF PRESENT ILLNESS
[FreeTextEntry1] : Patient is 32 yo RH woman, never smoker, with FMHx of Hemophilia A and PMHx of migraines formerly on Imitrex (had seen Dr. Mendoza for migraines), pre-eclampsia, and PCOS who presents as HFU from 4/23/22 for R occipital intraparenchymal hemorrhage initially identified at [facility] during work up for sudden, unusual severe HA, and then transferred to [facility] per family and was s/p diagnostic cerebral DSA with Dr. Castro on 4/28/22.   \par \par She has residual L eye lateral inferior quadrant defect. DDx by Stroke Team was ?RCVS from Imitrex overuse.   \par \par -CTV H: Negative  \par -MRI H: Redemonstrated right occipital intraparenchymal hemorrhage with mild surrounding edema and mass effect. No evidence of underlying mass or vascular malformation.  \par -MRA H: Unremarkable.  \par -MRV H: Unremarkable.  \par  \par -FMHx of mother with RA, Lupus, antiphospholipid positive \par -Positives from Autoimmune panel:  \par PHANI 1:320, speckled  \par ESR 68, CRP 17  \par \par -Positive from Coag panel:  \par Factor   \par PT 13.2 \par --------------------------------------------------------\par NI Team Impression 4/26/22:\par PT W/H/O PRE-ECLAMPSIA (HAS BEEN NORMOTENSIVE AFTERWARDS W/O MEDS), POLYCYSTIC OVARIAN SYNDROME AND MIGRAINE OVIEDO, WHO DEVELOPED SUDDEN/SEVERE/UNUSUALL RT SIDED HA WHICH DID NOT IMPROVE WITH TRIPTAN, WAS FOUND TO HAVE RIGHT CORTICAL PARIETO-OCCIPITAL IPH, AND CTA REPORTED B/L PCA STENOSIS. SHE DENIES SMOKING, USING ANY ILLICIT DRUGS, HERBS, STIMULANTS OR HORMONAL MEDS. AMONGST THE DDX, RCVS AND VASCULOPATHY NEED TO BE CONSIDERED. PLAN OF MANAGEMENT WAS DISCUSSED WITH THE PATIENT, STROKE TEAM, AND OUR NI TEAM. \par ---------------------------------------------------------\par Today, she presents with her mom and reports recently being in the ED 5/22/22 for c/o of seeing colors and flashing lights. She was thought to have ?ocular migraine/sz, but MRI H waw was negative for new stroke and showed improvement in IPH, EEG was normal, and she was given IV meds of migraine. She was sent home on Depakote, and Ubrelvy, and continued on Nortriptyline, but there may be interaction between Nortriptyline and Depakote.

## 2022-05-25 NOTE — ASSESSMENT
[FreeTextEntry1] : Patient is 30 yo RH woman, never smoker, with FMHx of Hemophilia A and PMHx of migraines formerly on Imitrex (had seen Dr. Mendoza for migraines), pre-eclampsia (180/80) who presents as HFU from 4/23/22 for R occipital intraparenchymal hemorrhage s/p diagnostic cerebral DSA with Dr. Castro on 4/28/22. One concern on cerebral angiogram is questionable R-sided cerebral AV fistula. Exam today continues to show L eye lateral inferior quadrant defect. \par \par PLAN: \par -Avoid NSAIDs, antiplatelets/AC or other vasoconstrictive therapy \par -C/w Nortriptyline 10 QD  \par -BP goal <140/80 \par -Referral to Rheumatology \par -F/u Neuro-ophtho @ [facility]\par -Avoid strenuous activity, anything that increase ICP\par -F/u in NI Clinic for s/p DSA  ASAP

## 2022-05-25 NOTE — ASSESSMENT
[FreeTextEntry1] : Patient is 30 yo RH woman, never smoker, with FMHx of Hemophilia A and PMHx of migraines formerly on Imitrex (had seen Dr. Mendoza for migraines), pre-eclampsia, and PCOS who presents as HFU from 4/23/22 for R occipital intraparenchymal hemorrhage initially identified at [facility] during work up for sudden, unusual severe HA, and then transferred to [facility] per family and was s/p diagnostic cerebral DSA with Dr. Castro on 4/28/22, which shows that there is may be R cerebral AV fistula, but it maybe related to her stroke vs real. On this cerebral DSA no signs of RCVS was visible. \par \par IN BRIEF:\par PT W/H/O PRE-ECLAMPSIA (HAS BEEN NORMOTENSIVE AFTERWARDS W/O MEDS), POLYCYSTIC OVARIAN SYNDROME AND MIGRAINE OVIEDO, WHO DEVELOPED SUDDEN/SEVERE/UNUSUALL RT SIDED HA WHICH DID NOT IMPROVE WITH TRIPTAN, AND WAS FOUND TO HAVE RIGHT CORTICAL PARIETO-OCCIPITAL IPH. HER CTA REPORTED B/L PCA STENOSIS. SHE DENIES SMOKING, USING ANY ILLICIT DRUGS, HERBS, STIMULANTS OR HORMONAL MEDS. SHE HAS BEEN MANAGED BY THE STROKE TEAM, AND OUR NI TEAM WAS CONSULTED TO R/O UNDERLYING VASCULAR MALFORMATION. CONSEQUENTLY SHE UNDERWENT DIAGNOSTIC CEREBRAL DSA, WHICH SHOWED A MILD SMALL EARLY  SHUNTING OF THE RIGHT DISTAL PCA INTO THE VENOUS SYSTEM. THIS FINDING WAS IN THE SAME ARE OF IPH, AND EITHER CAN BE PRIMARY ABNORMALITY OR SECONDARY TO THE IPH. IN ORDER TO BETTER UNDERSTAND THIS ABNORMAL FINDING IT WAS SUGGESTED TO REPEAT THE DSA ONCE THE EDEMA AND MASS EFFECT IS SUBSIDED. ALSO WE SUGGESTED TO R/O OTHER CAUSES OF VASCULOPATHY, INCLUDING INFLAMMATORY AND NON-INFLAMMATORY, SINCE THE DSA FAILED TO SHOW SIGNIFICANT IRREGULARITY OF THE VESSELS CALIBER, KNOWING THAT THE SENSITIVITY OF DSA FOR VASCULITIS IS NOT THAT HIGH. TODAY I WENT OVER THE DSA FINDINGS IN DETAIL WITH THE PATIENT AND HER MOTHER, EXPLAINING THE FINDINGS AND PLAN OF MANAGEMENT IN LAYMAN LANGUAGE WITH THEM. \par \par PLAN:\par -Repeat Diagnostic Cerebral Angiogram in A FEW WEEKS.\par -F/u in Stroke/TIA Clinic for all neurological/stroke management and determining etiology of stroke \par -Instructed patient to call 911 or report to ED if any acute neurological changes occur, such as having severe, sudden headache\par \par

## 2022-05-25 NOTE — PHYSICAL EXAM
[FreeTextEntry1] : Right groin access site is c/d/i. There is no erythema, edema, ecchymosis, or tenderness to palpation. Right pedal pulse is palpable.\par \par Focal neurological exam:\par \par MS: Awake, alert, oriented to person, place, situation and time. Normal affect. Follows commands. \par \par Language: Speech is clear, fluent with good repetition & comprehension. No dysarthria. \par \par CNs 2 - 12 intact. EOMI no nystagmus, no diplopia. Left eye lateral inferior quadrant defect. No facial asymmetry b/l, full eye closure strength b/l. Hearing grossly normal. Head turning & shoulder shrug intact b/l. Tongue midline, normal movements, no atrophy.\par \par Motor: Normal muscle bulk & tone. No noticeable tremor or seizure. No pronator drift. Muscle strength of b/l UE and b/l LE 5/5. \par \par Reflexes: DTR of biceps, knee and ankle normal \par \par Sensation: Intact to LT, temperature and vibration b/l throughout\par \par Cortical: No extinction\par \par Coordination: No dysmetria to FTN.\par \par Gait: No postural instability. Normal stance and tandem gait.\par \par NIHSS 1\par \par \par \par \par

## 2022-05-29 DIAGNOSIS — G43.109 MIGRAINE WITH AURA, NOT INTRACTABLE, WITHOUT STATUS MIGRAINOSUS: ICD-10-CM

## 2022-05-29 DIAGNOSIS — E66.9 OBESITY, UNSPECIFIED: ICD-10-CM

## 2022-05-29 DIAGNOSIS — Q51.3 BICORNATE UTERUS: ICD-10-CM

## 2022-05-29 DIAGNOSIS — H53.462 HOMONYMOUS BILATERAL FIELD DEFECTS, LEFT SIDE: ICD-10-CM

## 2022-05-29 DIAGNOSIS — G93.6 CEREBRAL EDEMA: ICD-10-CM

## 2022-05-29 DIAGNOSIS — E78.5 HYPERLIPIDEMIA, UNSPECIFIED: ICD-10-CM

## 2022-05-29 DIAGNOSIS — I69.198 OTHER SEQUELAE OF NONTRAUMATIC INTRACEREBRAL HEMORRHAGE: ICD-10-CM

## 2022-05-29 DIAGNOSIS — H43.393 OTHER VITREOUS OPACITIES, BILATERAL: ICD-10-CM

## 2022-05-29 DIAGNOSIS — E28.2 POLYCYSTIC OVARIAN SYNDROME: ICD-10-CM

## 2022-06-03 ENCOUNTER — NON-APPOINTMENT (OUTPATIENT)
Age: 32
End: 2022-06-03

## 2022-06-03 NOTE — CHART NOTE - NSCHARTNOTEFT_GEN_A_CORE
CDI Query response    Patient presented with ICH and was found to have mass effect however after reviewed images there is no clinically significant midline shift.  Symptoms are related to underlying damage to the occipital lobe and associated mass effect from the hemorrhage

## 2022-06-07 ENCOUNTER — APPOINTMENT (OUTPATIENT)
Dept: ENDOCRINOLOGY | Facility: CLINIC | Age: 32
End: 2022-06-07
Payer: MEDICAID

## 2022-06-07 VITALS
TEMPERATURE: 97.3 F | OXYGEN SATURATION: 98 % | SYSTOLIC BLOOD PRESSURE: 118 MMHG | HEART RATE: 80 BPM | WEIGHT: 203 LBS | HEIGHT: 61 IN | BODY MASS INDEX: 38.33 KG/M2 | DIASTOLIC BLOOD PRESSURE: 78 MMHG

## 2022-06-07 DIAGNOSIS — E66.9 OBESITY, UNSPECIFIED: ICD-10-CM

## 2022-06-07 DIAGNOSIS — E28.2 POLYCYSTIC OVARIAN SYNDROME: ICD-10-CM

## 2022-06-07 PROCEDURE — 99203 OFFICE O/P NEW LOW 30 MIN: CPT

## 2022-06-07 NOTE — END OF VISIT
[Time Spent: ___ minutes] : I have spent [unfilled] minutes of time on the encounter. DM (Diabetes Mellitus)    H/O  section

## 2022-06-07 NOTE — REVIEW OF SYSTEMS
[Recent Weight Gain (___ Lbs)] : recent weight gain: [unfilled] lbs [Headaches] : headaches [Fatigue] : no fatigue [Dysphagia] : no dysphagia [Neck Pain] : no neck pain [Dysphonia] : no dysphonia [Chest Pain] : no chest pain [Palpitations] : no palpitations [Lower Ext Edema] : no lower extremity edema [Shortness Of Breath] : no shortness of breath [SOB on Exertion] : no shortness of breath on exertion [Nausea] : no nausea [Constipation] : no constipation [Vomiting] : no vomiting [Diarrhea] : no diarrhea [Cold Intolerance] : no cold intolerance [Heat Intolerance] : no heat intolerance [Easy Bruising] : no tendency for easy bruising

## 2022-06-07 NOTE — HISTORY OF PRESENT ILLNESS
[FreeTextEntry1] : 31 year old female with history of Migraines, PCOS ,intraparenchymal hemorrhage ? unclear etiology yet , who present for evaluation of PCOS and weight gain \par \par # PCOS / Weight gain \par - was diagnosed 5 years ago , By endocrinologist Dr ruiz with PCOS, started  on metformin 850 mg daily and in conjunction with diet she was able to loose a lot of weight and periods were more regulated . \par - She was pregnant and stopped metformin , no GDM , had to deliver earlier at 34 weeks because of preeclampsia , baby had heart problems and needed CABG now is good . remained off metformin post pregnancy \par - + weight gain, periods, irregular, + facial hair stretch marks \par

## 2022-06-07 NOTE — DATA REVIEWED
[FreeTextEntry1] : reviewed recent labs in HIE A1c 5.6%  cmp ok \par \par \par review of Ct/ Mri brain : no pituitary pathology described \par - old Abdominal Ct scan : adrenals unremarkable

## 2022-06-07 NOTE — ASSESSMENT
[FreeTextEntry1] : 31 year old female with history of Migraines, PCOS ,intraparenchymal hemorrhage ? unclear etiology yet , who present for evaluation of PCOS and weight gain \par \par # PCOS / Weight gain \par - was diagnosed 5 years ago , By endocrinologist Dr ruiz with PCOS, started  on metformin 850 mg daily and in conjunction with diet she was able to loose a lot of weight and periods were more regulated . \par - She was pregnant and stopped metformin , no GDM , had to deliver earlier at 34 weeks because of preeclampsia , baby had heart problems and needed CABG now is good . remained off metformin post pregnancy \par - + weight gain, periods, irregular, + facial hair stretch marks \par - do hormonal work up , will benefit from restarting metformin after all imaging and contrast done with neurology \par - reviewed importance of weight loss

## 2022-06-07 NOTE — PAST MEDICAL HISTORY
[Total Preg ___] : G[unfilled] [Abortions ___] : Abortions:[unfilled] [Ectopics ___] : ectopic pregnancies: [unfilled]  [Menstruating] : The patient is menstruating

## 2022-06-07 NOTE — PHYSICAL EXAM
[Alert] : alert [Well Nourished] : well nourished [Obese] : obese [No Acute Distress] : no acute distress [No Proptosis] : no proptosis [No Lid Lag] : no lid lag [Thyroid Not Enlarged] : the thyroid was not enlarged [No Respiratory Distress] : no respiratory distress [No Accessory Muscle Use] : no accessory muscle use [Clear to Auscultation] : lungs were clear to auscultation bilaterally [No Murmurs] : no murmurs [Regular Rhythm] : with a regular rhythm [No Edema] : no peripheral edema [Not Tender] : non-tender [Soft] : abdomen soft [Abdominal Striae] : abdominal striae present [Hirsutism] : hirsutism present [No Tremors] : no tremors [Oriented x3] : oriented to person, place, and time [Acanthosis Nigricans] : no acanthosis nigricans [Acne] : no acne [de-identified] : wide purplish

## 2022-06-09 ENCOUNTER — OUTPATIENT (OUTPATIENT)
Dept: OUTPATIENT SERVICES | Facility: HOSPITAL | Age: 32
LOS: 1 days | Discharge: HOME | End: 2022-06-09
Payer: MEDICAID

## 2022-06-09 VITALS
TEMPERATURE: 98 F | RESPIRATION RATE: 16 BRPM | DIASTOLIC BLOOD PRESSURE: 76 MMHG | SYSTOLIC BLOOD PRESSURE: 122 MMHG | HEART RATE: 86 BPM | HEIGHT: 61 IN | OXYGEN SATURATION: 97 % | WEIGHT: 199.96 LBS

## 2022-06-09 DIAGNOSIS — Z98.890 OTHER SPECIFIED POSTPROCEDURAL STATES: Chronic | ICD-10-CM

## 2022-06-09 DIAGNOSIS — Z01.818 ENCOUNTER FOR OTHER PREPROCEDURAL EXAMINATION: ICD-10-CM

## 2022-06-09 DIAGNOSIS — I67.9 CEREBROVASCULAR DISEASE, UNSPECIFIED: ICD-10-CM

## 2022-06-09 PROCEDURE — 93010 ELECTROCARDIOGRAM REPORT: CPT

## 2022-06-09 NOTE — H&P PST ADULT - NSICDXPASTMEDICALHX_GEN_ALL_CORE_FT
PAST MEDICAL HISTORY:  Bicornuate uterus     Migraine headache     Obesity     PCOS (polycystic ovarian syndrome)     TIA (transient ischemic attack) [facility]-4/2022-inf lat visual field loss

## 2022-06-09 NOTE — H&P PST ADULT - NSICDXPASTSURGICALHX_GEN_ALL_CORE_FT
PAST SURGICAL HISTORY:  H/O unilateral salpingectomy gyn surgery-ectopic pregnancy   section  Cerebral angiogram

## 2022-06-09 NOTE — H&P PST ADULT - HISTORY OF PRESENT ILLNESS
31yr old female states [facility] admission for IPH and D/Cd 5/22/2022?, but 2 days later developed cont strong flashing lights LT eye - CT reportedly negative-is  scheduled for Diagnostic cerebral angiogram . Denies COVID S/S , Did not receive vaccine. Verbalized understanding of COVID prevention measures. Exercise wilfredo 1 FOS LTD by fatigue.  Anesthesia Alert  yes--Difficult Airway  NO--History of neck surgery or radiation  NO--Limited ROM of neck  NO--History of Malignant hyperthermia  NO--Personal or family history of Pseudocholinesterase deficiency  NO--Prior Anesthesia Complication  NO--Latex Allergy  NO--Loose teeth  NO--History of Rheumatoid Arthritis  NO--DANIEL  No Bleeding risk  NO--Other_____
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
## 2022-06-14 ENCOUNTER — LABORATORY RESULT (OUTPATIENT)
Age: 32
End: 2022-06-14

## 2022-06-15 ENCOUNTER — APPOINTMENT (OUTPATIENT)
Dept: NEUROLOGY | Facility: CLINIC | Age: 32
End: 2022-06-15

## 2022-06-16 ENCOUNTER — OUTPATIENT (OUTPATIENT)
Dept: OUTPATIENT SERVICES | Facility: HOSPITAL | Age: 32
LOS: 1 days | Discharge: HOME | End: 2022-06-16

## 2022-06-16 ENCOUNTER — APPOINTMENT (OUTPATIENT)
Dept: NEUROLOGY | Facility: HOSPITAL | Age: 32
End: 2022-06-16

## 2022-06-16 ENCOUNTER — TRANSCRIPTION ENCOUNTER (OUTPATIENT)
Age: 32
End: 2022-06-16

## 2022-06-16 ENCOUNTER — RESULT REVIEW (OUTPATIENT)
Age: 32
End: 2022-06-16

## 2022-06-16 DIAGNOSIS — I67.9 CEREBROVASCULAR DISEASE, UNSPECIFIED: ICD-10-CM

## 2022-06-16 DIAGNOSIS — Z98.890 OTHER SPECIFIED POSTPROCEDURAL STATES: Chronic | ICD-10-CM

## 2022-06-16 PROCEDURE — 76937 US GUIDE VASCULAR ACCESS: CPT | Mod: 26

## 2022-06-16 PROCEDURE — 76377 3D RENDER W/INTRP POSTPROCES: CPT | Mod: 26

## 2022-06-16 PROCEDURE — 36226 PLACE CATH VERTEBRAL ART: CPT | Mod: 50

## 2022-06-16 PROCEDURE — 36224 PLACE CATH CAROTD ART: CPT | Mod: 50

## 2022-06-16 PROCEDURE — 36227 PLACE CATH XTRNL CAROTID: CPT | Mod: 50

## 2022-06-16 NOTE — CHART NOTE - NSCHARTNOTEFT_GEN_A_CORE
Patient to be discharged after an uneventful recovery period of 4 hours in the IR department.     Post operative instructions given to patient in the presence of nursing staff.     Activity:   Do not drive or operate heavy machinery for 24 hr, limit your physical or any strenous activity for 1 week after embolization and recanalization OR 48 hr after angiogram. Support the groin site with your hand when you sneeze, cough, or strain with bowel movements. Strenuous activity can include but is not limited to heavy lifting > 10 lbs, pushing or pulling heavy objects. If you must take stairs, please do so slowly.     Diet:   You may resume your regular diet. Drink plenty of water > 3L per day unless otherwise advised.     Hygiene:   After 24 hr you may shower and remove dressing from the site. Do not tub bathe for 1 week. Do not rub or apply lotion, cream, powder to the affected area. Leave it open to air.     Medications:   Soreness or tenderness at the site is possible and will diminish over time. You may take tylenol every 4-6 hr as needed. Nothing stronger is needed. If you are diabetic and taking medications containing Metformin, do not take them for 48 hr after the procedure.     Special instructions:   Bruising or black and blue at the puncture site is possible. If there is bleeding from the puncture site apply direct firm pressure on the site and call 911. Any sudden swelling, redness, fever, discahrge or severe  pain call the NI clinic or the IR numbers listed on the form given. If you are unable to contact either numbers, please report to [facility] ED. If you notice scab formation in the area avoid touching the site and allow it to heal. Numbness and pins and needles sensation in the affected arm hand leg or if the affected site becomes cool to touch or pale that persists for an extended period of time call the NI clinic or IR department immediately. If you are unable to contact either numbers, please report to [facility] ED. If you develop sudden weakness, numbness, imbalance, speech problems, visual changes, report to [facility] ED or the nearest ED. Inform your dentist or surgeon if you are taking ASA other antiplatelet medications or other types of blood thinners. Please call the NI clinic or IR department if you experience spontaneous bleeding, bruising, or blood in your urine or stool. You will receive a phone call on the next business day after your angiogram by our team. If you had an embolization or recanalization procedure, you will receive a call at 3 days and 30 days after your discharge from our NI team. The purpose of the call is to answer any of your questions and see how you feel.
PACU ANESTHESIA ADMISSION NOTE      Procedure: Diagnostic cerebral angiogram  Post op diagnosis:  right parietoocipital IPH     ____  Intubated  TV:______       Rate: ______      FiO2: ______    _x___  Patent Airway    x____  Full return of protective reflexes    __x__  Full recovery from anesthesia / back to baseline     Vitals:   T:    98        R: 14                 BP:  120/68                Sat:   100                P: 72      Mental Status:  __x__ Awake   _____ Alert   _____ Drowsy   _____ Sedated    Nausea/Vomiting:  _x___ NO  ______Yes,   See Post - Op Orders          Pain Scale (0-10):  _____    Treatment: __x__ None    ____ See Post - Op/PCA Orders    Post - Operative Fluids:   ____ Oral   _x___ See Post - Op Orders    Plan: Discharge:   __x__Home       _____Floor     _____Critical Care    _____  Other:_________________    Comments: Patient tolerated procedure well

## 2022-06-16 NOTE — BRIEF OPERATIVE NOTE - COMMENTS
Patient is s/p 6 vessel diagnostic cerebral angiogram without complication.   5fr Mynx control deployed in right femoral artery with manual pressure held for 5 min post procedure.   Patient is without complaints post procedure.   Discussed prelim findings with the patient and her .

## 2022-06-16 NOTE — BRIEF OPERATIVE NOTE - OPERATION/FINDINGS
Procedure : Diagnostic cerebral angiogram     Amount and type of contrast : Visipaque 320 :   Medications given during procedure: Ancef 2g IV   Implants placed: n/a   Complications : n/a    Post-procedure exam:   NIHSS 1 - left lower quadrantanopia   Extremity - right groin CDI without evidence of bleeding hematoma ecchymosis oozing swelling at the time of closure. Bilateral distal pulses intact. Temperature and color consistent bilaterally. Site soft and nontender. Right knee immobilizer in plcae 4 hr post procedure.   Abd - NTND     Suggestions :   Patient is to recover in IR recovery for 4 hr post procedure then to be discharged home pending uneventful recovery.  Please follow post NI orders for neuro checks, distal pulses, vitals, and groin checks placed for total of 4 hour recovery period following procedure. Please keep reverse trendelenberg, knee immobilizer, bed rest x 4 hr. Keep IVF as ordered.   BP < 140/80     Please notify provider with any signs of bleeding or hematoma at R groin site, change in mental status, vitals outside parameters, or absent distal pulses.   [phone]

## 2022-06-16 NOTE — PRE PROCEDURE NOTE - PRE PROCEDURE EVALUATION
HPI: Patient is a 31 year old female history of migraines preeclampsia, PCOS, with a family history of lupus, antiphospholipid syndrome, hemophilia A - presented  with right sided sudden severe headache, found to have right parietooccipital IPH, now s/p diagnostic cerebral angiogram with Dr. Castro at the time which revealed early shunting of the right PCA without evidence of aneurysm or gross malformation. Patient is today for repeat diagnostic cerebral angiogram with Dr. Castro to better characterize the vessels.   NPO except meds since MN   No AC/AP.   NIHSS 1 for left eye lateral inferior quadrantanopia.   Denies current HA, dizziness, blurry vision, sensory loss, weakness, nausea.   Ancef 2g IV     Allergies: No Known Allergies    PAST MEDICAL & SURGICAL HISTORY:  Bicornuate uterus  Migraine headache  TIA (transient ischemic attack)  IPH-2022-inf lat visual field loss  PCOS (polycystic ovarian syndrome)  Obesity  H/O unilateral salpingectomy  gyn surgery-ectopic pregnancy   section  Cerebral angiogram    FAMILY HISTORY:  Family history of systemic lupus erythematosus (SLE) in mother (Mother)  Family history of hemophilia A (Sibling)  Family history of antiphospholipid syndrome (Mother)    Assessment/Plan:   This is a 31y  year old right  hand dominant Female presents with history of right parietoocipital IPH with early shunting of the right distal PCA on diagnostic angio.   Patient presents to neuro-IR for diagnostic cerebral angiogram.   Procedure, goals, risks, benefits and alternatives were discussed with patient and patient's family.  All questions were answered to best understanding.   Risks discussed include but are not limited to stroke, vessel injury, hemorrhage, and or right  groin hematoma.  Patient demonstrates understanding  of all risks involved with this procedure and wishes to continue.     Appropriate consent was obtained from patient and consent is in the patient's chart.

## 2022-06-17 ENCOUNTER — NON-APPOINTMENT (OUTPATIENT)
Age: 32
End: 2022-06-17

## 2022-06-17 ENCOUNTER — EMERGENCY (EMERGENCY)
Facility: HOSPITAL | Age: 32
LOS: 0 days | Discharge: HOME | End: 2022-06-17
Attending: EMERGENCY MEDICINE | Admitting: EMERGENCY MEDICINE
Payer: MEDICAID

## 2022-06-17 VITALS
HEIGHT: 61 IN | SYSTOLIC BLOOD PRESSURE: 134 MMHG | TEMPERATURE: 99 F | WEIGHT: 190.04 LBS | HEART RATE: 97 BPM | RESPIRATION RATE: 18 BRPM | DIASTOLIC BLOOD PRESSURE: 74 MMHG | OXYGEN SATURATION: 100 %

## 2022-06-17 VITALS
DIASTOLIC BLOOD PRESSURE: 65 MMHG | OXYGEN SATURATION: 99 % | HEART RATE: 94 BPM | SYSTOLIC BLOOD PRESSURE: 114 MMHG | RESPIRATION RATE: 18 BRPM | TEMPERATURE: 96 F

## 2022-06-17 DIAGNOSIS — Z86.73 PERSONAL HISTORY OF TRANSIENT ISCHEMIC ATTACK (TIA), AND CEREBRAL INFARCTION WITHOUT RESIDUAL DEFICITS: ICD-10-CM

## 2022-06-17 DIAGNOSIS — Z98.890 OTHER SPECIFIED POSTPROCEDURAL STATES: Chronic | ICD-10-CM

## 2022-06-17 DIAGNOSIS — Z86.79 PERSONAL HISTORY OF OTHER DISEASES OF THE CIRCULATORY SYSTEM: ICD-10-CM

## 2022-06-17 DIAGNOSIS — Z20.822 CONTACT WITH AND (SUSPECTED) EXPOSURE TO COVID-19: ICD-10-CM

## 2022-06-17 DIAGNOSIS — G43.909 MIGRAINE, UNSPECIFIED, NOT INTRACTABLE, WITHOUT STATUS MIGRAINOSUS: ICD-10-CM

## 2022-06-17 DIAGNOSIS — E28.2 POLYCYSTIC OVARIAN SYNDROME: ICD-10-CM

## 2022-06-17 DIAGNOSIS — R51.9 HEADACHE, UNSPECIFIED: ICD-10-CM

## 2022-06-17 DIAGNOSIS — R11.2 NAUSEA WITH VOMITING, UNSPECIFIED: ICD-10-CM

## 2022-06-17 PROBLEM — E66.9 OBESITY, UNSPECIFIED: Chronic | Status: ACTIVE | Noted: 2022-06-09

## 2022-06-17 PROBLEM — G45.9 TRANSIENT CEREBRAL ISCHEMIC ATTACK, UNSPECIFIED: Chronic | Status: ACTIVE | Noted: 2022-06-09

## 2022-06-17 LAB
ALBUMIN SERPL ELPH-MCNC: 4.4 G/DL — SIGNIFICANT CHANGE UP (ref 3.5–5.2)
ALP SERPL-CCNC: 105 U/L — SIGNIFICANT CHANGE UP (ref 30–115)
ALT FLD-CCNC: 20 U/L — SIGNIFICANT CHANGE UP (ref 0–41)
ANION GAP SERPL CALC-SCNC: 14 MMOL/L — SIGNIFICANT CHANGE UP (ref 7–14)
APPEARANCE UR: ABNORMAL
AST SERPL-CCNC: 16 U/L — SIGNIFICANT CHANGE UP (ref 0–41)
BACTERIA # UR AUTO: NEGATIVE — SIGNIFICANT CHANGE UP
BASOPHILS # BLD AUTO: 0.07 K/UL — SIGNIFICANT CHANGE UP (ref 0–0.2)
BASOPHILS NFR BLD AUTO: 0.4 % — SIGNIFICANT CHANGE UP (ref 0–1)
BILIRUB SERPL-MCNC: 0.5 MG/DL — SIGNIFICANT CHANGE UP (ref 0.2–1.2)
BILIRUB UR-MCNC: NEGATIVE — SIGNIFICANT CHANGE UP
BUN SERPL-MCNC: 11 MG/DL — SIGNIFICANT CHANGE UP (ref 10–20)
CALCIUM SERPL-MCNC: 10 MG/DL — SIGNIFICANT CHANGE UP (ref 8.5–10.1)
CHLORIDE SERPL-SCNC: 105 MMOL/L — SIGNIFICANT CHANGE UP (ref 98–110)
CO2 SERPL-SCNC: 24 MMOL/L — SIGNIFICANT CHANGE UP (ref 17–32)
COLOR SPEC: YELLOW — SIGNIFICANT CHANGE UP
CREAT SERPL-MCNC: 0.7 MG/DL — SIGNIFICANT CHANGE UP (ref 0.7–1.5)
DIFF PNL FLD: NEGATIVE — SIGNIFICANT CHANGE UP
EGFR: 117 ML/MIN/1.73M2 — SIGNIFICANT CHANGE UP
EOSINOPHIL # BLD AUTO: 0.03 K/UL — SIGNIFICANT CHANGE UP (ref 0–0.7)
EOSINOPHIL NFR BLD AUTO: 0.2 % — SIGNIFICANT CHANGE UP (ref 0–8)
EPI CELLS # UR: >27 /HPF — HIGH (ref 0–5)
GLUCOSE SERPL-MCNC: 116 MG/DL — HIGH (ref 70–99)
GLUCOSE UR QL: NEGATIVE — SIGNIFICANT CHANGE UP
HCG SERPL QL: NEGATIVE — SIGNIFICANT CHANGE UP
HCT VFR BLD CALC: 35.2 % — LOW (ref 37–47)
HCT VFR BLD CALC: 36.6 % — LOW (ref 37–47)
HGB BLD-MCNC: 11.8 G/DL — LOW (ref 12–16)
HGB BLD-MCNC: 12.3 G/DL — SIGNIFICANT CHANGE UP (ref 12–16)
HYALINE CASTS # UR AUTO: 37 /LPF — HIGH (ref 0–7)
IMM GRANULOCYTES NFR BLD AUTO: 0.4 % — HIGH (ref 0.1–0.3)
KETONES UR-MCNC: NEGATIVE — SIGNIFICANT CHANGE UP
LEUKOCYTE ESTERASE UR-ACNC: NEGATIVE — SIGNIFICANT CHANGE UP
LYMPHOCYTES # BLD AUTO: 0.83 K/UL — LOW (ref 1.2–3.4)
LYMPHOCYTES # BLD AUTO: 4.6 % — LOW (ref 20.5–51.1)
MAGNESIUM SERPL-MCNC: 1.7 MG/DL — LOW (ref 1.8–2.4)
MCHC RBC-ENTMCNC: 25.9 PG — LOW (ref 27–31)
MCHC RBC-ENTMCNC: 26.1 PG — LOW (ref 27–31)
MCHC RBC-ENTMCNC: 33.5 G/DL — SIGNIFICANT CHANGE UP (ref 32–37)
MCHC RBC-ENTMCNC: 33.6 G/DL — SIGNIFICANT CHANGE UP (ref 32–37)
MCV RBC AUTO: 77.4 FL — LOW (ref 81–99)
MCV RBC AUTO: 77.5 FL — LOW (ref 81–99)
MONOCYTES # BLD AUTO: 0.59 K/UL — SIGNIFICANT CHANGE UP (ref 0.1–0.6)
MONOCYTES NFR BLD AUTO: 3.3 % — SIGNIFICANT CHANGE UP (ref 1.7–9.3)
NEUTROPHILS # BLD AUTO: 16.28 K/UL — HIGH (ref 1.4–6.5)
NEUTROPHILS NFR BLD AUTO: 91.1 % — HIGH (ref 42.2–75.2)
NITRITE UR-MCNC: NEGATIVE — SIGNIFICANT CHANGE UP
NRBC # BLD: 0 /100 WBCS — SIGNIFICANT CHANGE UP (ref 0–0)
NRBC # BLD: 0 /100 WBCS — SIGNIFICANT CHANGE UP (ref 0–0)
PH UR: 6.5 — SIGNIFICANT CHANGE UP (ref 5–8)
PLATELET # BLD AUTO: 417 K/UL — HIGH (ref 130–400)
PLATELET # BLD AUTO: 462 K/UL — HIGH (ref 130–400)
POTASSIUM SERPL-MCNC: 4.2 MMOL/L — SIGNIFICANT CHANGE UP (ref 3.5–5)
POTASSIUM SERPL-SCNC: 4.2 MMOL/L — SIGNIFICANT CHANGE UP (ref 3.5–5)
PROT SERPL-MCNC: 7 G/DL — SIGNIFICANT CHANGE UP (ref 6–8)
PROT UR-MCNC: ABNORMAL
RBC # BLD: 4.55 M/UL — SIGNIFICANT CHANGE UP (ref 4.2–5.4)
RBC # BLD: 4.72 M/UL — SIGNIFICANT CHANGE UP (ref 4.2–5.4)
RBC # FLD: 13.6 % — SIGNIFICANT CHANGE UP (ref 11.5–14.5)
RBC # FLD: 13.7 % — SIGNIFICANT CHANGE UP (ref 11.5–14.5)
RBC CASTS # UR COMP ASSIST: 3 /HPF — SIGNIFICANT CHANGE UP (ref 0–4)
SARS-COV-2 RNA SPEC QL NAA+PROBE: SIGNIFICANT CHANGE UP
SODIUM SERPL-SCNC: 143 MMOL/L — SIGNIFICANT CHANGE UP (ref 135–146)
SP GR SPEC: 1.03 — SIGNIFICANT CHANGE UP (ref 1.01–1.03)
UROBILINOGEN FLD QL: SIGNIFICANT CHANGE UP
WBC # BLD: 13.71 K/UL — HIGH (ref 4.8–10.8)
WBC # BLD: 17.87 K/UL — HIGH (ref 4.8–10.8)
WBC # FLD AUTO: 13.71 K/UL — HIGH (ref 4.8–10.8)
WBC # FLD AUTO: 17.87 K/UL — HIGH (ref 4.8–10.8)
WBC UR QL: 11 /HPF — HIGH (ref 0–5)

## 2022-06-17 PROCEDURE — 99285 EMERGENCY DEPT VISIT HI MDM: CPT

## 2022-06-17 PROCEDURE — 99283 EMERGENCY DEPT VISIT LOW MDM: CPT

## 2022-06-17 PROCEDURE — 70450 CT HEAD/BRAIN W/O DYE: CPT | Mod: 26,MA

## 2022-06-17 PROCEDURE — 71045 X-RAY EXAM CHEST 1 VIEW: CPT | Mod: 26

## 2022-06-17 RX ORDER — METOCLOPRAMIDE HCL 10 MG
10 TABLET ORAL ONCE
Refills: 0 | Status: COMPLETED | OUTPATIENT
Start: 2022-06-17 | End: 2022-06-17

## 2022-06-17 RX ORDER — DIPHENHYDRAMINE HCL 50 MG
25 CAPSULE ORAL ONCE
Refills: 0 | Status: COMPLETED | OUTPATIENT
Start: 2022-06-17 | End: 2022-06-17

## 2022-06-17 RX ORDER — SODIUM CHLORIDE 9 MG/ML
1000 INJECTION, SOLUTION INTRAVENOUS ONCE
Refills: 0 | Status: COMPLETED | OUTPATIENT
Start: 2022-06-17 | End: 2022-06-17

## 2022-06-17 RX ORDER — DIVALPROEX SODIUM 500 MG/1
250 TABLET, DELAYED RELEASE ORAL ONCE
Refills: 0 | Status: COMPLETED | OUTPATIENT
Start: 2022-06-17 | End: 2022-06-17

## 2022-06-17 RX ORDER — PROCHLORPERAZINE MALEATE 5 MG
10 TABLET ORAL ONCE
Refills: 0 | Status: COMPLETED | OUTPATIENT
Start: 2022-06-17 | End: 2022-06-17

## 2022-06-17 RX ADMIN — SODIUM CHLORIDE 1000 MILLILITER(S): 9 INJECTION, SOLUTION INTRAVENOUS at 10:36

## 2022-06-17 RX ADMIN — Medication 25 MILLIGRAM(S): at 12:36

## 2022-06-17 RX ADMIN — Medication 104 MILLIGRAM(S): at 10:36

## 2022-06-17 RX ADMIN — Medication 104 MILLIGRAM(S): at 12:37

## 2022-06-17 RX ADMIN — DIVALPROEX SODIUM 250 MILLIGRAM(S): 500 TABLET, DELAYED RELEASE ORAL at 14:52

## 2022-06-17 NOTE — ED PROVIDER NOTE - NSFOLLOWUPINSTRUCTIONS_ED_ALL_ED_FT
- Please follow up with Dr Castro    General Headache Without Cause  A headache is pain or discomfort felt around the head or neck area. The specific cause of a headache may not be found. There are many causes and types of headaches. A few common ones are:    Tension headaches.  Migraine headaches.  Cluster headaches.  Chronic daily headaches.    Follow these instructions at home:  Watch your condition for any changes. Take these steps to help with your condition:    Managing pain     Take over-the-counter and prescription medicines only as told by your health care provider.  Lie down in a dark, quiet room when you have a headache.  If directed, apply ice to the head and neck area:    Put ice in a plastic bag.  Place a towel between your skin and the bag.  Leave the ice on for 20 minutes, 2–3 times per day.    Use a heating pad or hot shower to apply heat to the head and neck area as told by your health care provider.  ImageKeep lights dim if bright lights bother you or make your headaches worse.  Eating and drinking     Eat meals on a regular schedule.  Limit alcohol use.  Decrease the amount of caffeine you drink, or stop drinking caffeine.  General instructions     Keep all follow-up visits as told by your health care provider. This is important.  Keep a headache journal to help find out what may trigger your headaches. For example, write down:    What you eat and drink.  How much sleep you get.  Any change to your diet or medicines.    Try massage or other relaxation techniques.  Limit stress.  Sit up straight, and do not tense your muscles.  Do not use tobacco products, including cigarettes, chewing tobacco, or e-cigarettes. If you need help quitting, ask your health care provider.  Exercise regularly as told by your health care provider.  ImageSleep on a regular schedule. Get 7–9 hours of sleep, or the amount recommended by your health care provider.  Contact a health care provider if:  Your symptoms are not helped by medicine.  You have a headache that is different from the usual headache.  You have nausea or you vomit.  You have a fever.  Get help right away if:  Your headache becomes severe.  You have repeated vomiting.  You have a stiff neck.  You have a loss of vision.  You have problems with speech.  You have pain in the eye or ear.  You have muscular weakness or loss of muscle control.  You lose your balance or have trouble walking.  You feel faint or pass out.  You have confusion.  This information is not intended to replace advice given to you by your health care provider. Make sure you discuss any questions you have with your health care provider.

## 2022-06-17 NOTE — ED PROVIDER NOTE - PHYSICAL EXAMINATION
CONSTITUTIONAL: in mild acute distress, afebrile  SKIN: Warm, dry  HEAD: Normocephalic; atraumatic  EYES: No conjunctival injection. EOMI. PERRLA, no nystagmus  ENT: No nasal discharge; oropharynx nonerythematous; airway clear  NECK: Supple; non tender  CARD:  Regular rate and rhythm  RESP: CTAB; No wheezes, crackles, rales or rhonchi  ABD: Soft NTND; No guarding or rebound tenderness  EXT: Normal ROM.  No clubbing or cyanosis.  No edema; R groin site nontender, non erythematous  NEURO: A&O x3, grossly unremarkable, no focal deficits; CN 2-12 grossly intact. +5/5 strength and sensation wnl in all extremities. No pronator drift, no dysarthria, no ataxia, normal gait, nml FNF, no facial droop  *Chaperone RN Sindy was used during the encounter. A professional environment was maintained and discussed with patient

## 2022-06-17 NOTE — ED ADULT NURSE NOTE - NSICDXPASTMEDICALHX_GEN_ALL_CORE_FT
PAST MEDICAL HISTORY:  Bicornuate uterus     Migraine headache     Obesity     PCOS (polycystic ovarian syndrome)     TIA (transient ischemic attack) [facility]-4/2022-inf lat visual field loss

## 2022-06-17 NOTE — ED PROVIDER NOTE - WR ORDER STATUS 1
Finger or Toe Contusion (Child)  A contusion is another word for a bruise. It happens when small blood vessels break open and leak blood into the nearby area. A finger or toe contusion can result from a bump, hit, or fall. Symptoms of a contusion often include changes in skin color (bruising), swelling, and pain. It may take several hours for a deep bruise to show up. If the injury is severe, your child may need an X-ray to check for broken bones.  The finger or toe may be taped or wrapped to protect it and help reduce swelling. For a severely bruised toe, the child may need crutches to get around for a few days.  Swelling should decrease in a few days. Bruising and pain may take several weeks to go away. Your child can gradually go back to normal activities when the swelling has gone down and he or she feels better.   Home care  Follow these guidelines when caring for your child at home:    Your child s healthcare provider may prescribe medicines for pain and inflammation. Follow all instructions for giving these to your child.    Have your child rest the leg or arm. You may need to restrict your child's activities for a few days.    When your child sits or lies down, have your child elevate the affected hand or foot above the level of his or her heart as often as possible. This is to help ease swelling. For a child older than one year, prop the child's hand or foot on pillows.    Use cold to help reduce swelling and pain. For infants or toddlers, wet a clean cloth with cold water, then wring it out. For older children, use a cold pack or a plastic bag of ice cubes wrapped in a thin, dry cloth.  Apply the cold source to the bruised area for up to 20 minutes. Repeat this several times a day while your child is awake. Continue for 1 or 2 days or as instructed.    When the swelling has gone away, start using warm compresses. This is a clean cloth that s damp with warm water. Apply this to the area for 10 minutes,  several times a day.    If your child was given tape or a wrap, follow instructions for how to use it and when to remove it.    Follow any other instructions you were given.    Keep in mind that bruising may take several weeks to go away.  Follow-up care  Follow up with your child s healthcare provider.  Special note to parents  Healthcare providers are trained to see injuries such as this in young children as a sign of possible abuse. You may be asked questions about how your child was injured. Healthcare providers are required by law to ask you these questions. This is done to protect your child. Please try to be patient.  When to seek medical advice  Call your child's healthcare provider right away if your child has any of these:    Bruising that gets worse    Pain or swelling that doesn't get better or that gets worse    Numbness or tingling of the affected foot or hand    The affected finger or hand or affected toe or foot feels cold or looks very pale  Date Last Reviewed: 3/1/2017    6916-7699 [employer]. [address] [place] [address]. All rights reserved. This information is not intended as a substitute for professional medical care. Always follow your healthcare professional's instructions.          Understanding Black-and-Blue Nails    A black-and-blue nail (also called a black nail) is usually caused by sudden or repetitive injury to a toe. This might occur during sports that involve running or stopping quickly. The injury may also result from a heavy object falling on a toe. If your toe is black and blue but not injured, see your healthcare provider immediately.  Symptoms  The big toe is most often affected. Bruised, broken blood vessels cause the black-and-blue colors under the nail. If the condition is the result of a sudden injury, pain may be severe.  Evaluation  Your healthcare provider will talk with you about your symptoms and physical activities. He or she may press the  area at the end of the toe to determine the extent of pain. Your toe and foot will be examined for any signs of infection. If a fracture or a bone spur is suspected, X-rays may be needed. If small black spots are present under the nail, other problems may need to be ruled out.  Treatment  If pain is severe, the nail may be removed, or a hole may be drilled in the nail to allow drainage of the fluid underneath. This relieves the pressure. A local anesthetic may be used. Pain may also be relieved with prescription medicines, or by soaking or icing the area. If pain is not severe, you may not need treatment. The nail can be thinned or left alone to fall off. A new nail should grow to replace it.  Prevention  Many nail problems can be prevented by wearing the right shoes and trimming your nails properly. To help avoid infection, keep your feet clean and dry. If you have diabetes, talk with your healthcare provider before doing any foot self-care.    The right shoes: Get your feet measured (your size may change as you age). Wear shoes that are supportive and roomy enough for your toes to wiggle. Look for shoes made of natural materials such as leather, which allow your feet to breathe.    Proper trimming: To avoid problems, trim your toenails straight across without cutting down into the corners. If you can t trim your own nails, ask a healthcare provider to do so for you.  Date Last Reviewed: 10/1/2016    1522-7679 [employer]. [address] [place] [address], [place] [address]. All rights reserved. This information is not intended as a substitute for professional medical care. Always follow your healthcare professional's instructions.      You can start the Keflex if the redness/warmth of the toe gets worse.    Performed Resulted

## 2022-06-17 NOTE — CONSULT NOTE ADULT - SUBJECTIVE AND OBJECTIVE BOX
Neurology Consult    Patient is a 31y old  Female who presents with a chief complaint of headache    HPI:  Patient is a 31 year old woman with a PMH of [facility] 2022, PCOS, and migraines. Patient is one day post uneventful diagnostic cerebral angiogram. Patient reports a mild frontal headache last evening with associated nausea and vomiting. Patient often experienced headaches but was concerned and presented to the ED. While in ED found to have elevated WBC(17) and mildly febrile (99.3).    PAST MEDICAL & SURGICAL HISTORY:  Bicornuate uterus      Migraine headache      TIA (transient ischemic attack)  [facility]-2022-inf lat visual field loss      PCOS (polycystic ovarian syndrome)      Obesity      H/O unilateral salpingectomy  gyn surgery-ectopic pregnancy   section  Cerebral angiogram          FAMILY HISTORY:  Family history of systemic lupus erythematosus (SLE) in mother (Mother)    Family history of hemophilia A (Sibling)    Family history of antiphospholipid syndrome (Mother)        Social History: (-) x 3    Allergies    No Known Allergies    Intolerances        MEDICATIONS  (STANDING):    MEDICATIONS  (PRN):      Vital Signs Last 24 Hrs  T(C): 36.4 (2022 15:31), Max: 37.4 (2022 09:16)  T(F): 97.5 (2022 15:31), Max: 99.3 (2022 09:16)  HR: 104 (2022 15:31) (97 - 104)  BP: 111/64 (2022 15:31) (111/64 - 134/74)  BP(mean): --  RR: 18 (2022 15:31) (18 - 18)  SpO2: 100% (2022 15:31) (100% - 100%)    Examination:  General:  Appearance is consistent with chronologic age.  No abnormal facies.  Gross skin survey within normal limits.    Cognitive/Language:  The patient is oriented to person, place, time and date.  Recent and remote memory intact.  Fund of knowledge is intact and normal.  Language with normal repetition, comprehension and naming.  Nondysarthric.    Eyes: intact VA, VFF.  EOMI w/o nystagmus, skew or reported double vision.  PERRL.  No ptosis/weakness of eyelid closure.    Face:  Facial sensation normal V1 - 3, no facial asymmetry.    Motor examination:   Normal tone, bulk and range of motion.  No tenderness, twitching, tremors or involuntary movements.  Formal Muscle Strength Testing: (MRC grade R/L) 5/5 UE; 5/5 LE.  No observable drift.  Sensory examination:   Intact to light touch in all extremities.  Cerebellum:   FTN/HKS intact with normal SUN in all limbs.  No dysmetria or dysdiadokinesia.  Gait deferred    NIHSS 0      Labs:   CBC Full  -  ( 2022 10:15 )  WBC Count : 17.87 K/uL  RBC Count : 4.72 M/uL  Hemoglobin : 12.3 g/dL  Hematocrit : 36.6 %  Platelet Count - Automated : 462 K/uL  Mean Cell Volume : 77.5 fL  Mean Cell Hemoglobin : 26.1 pg  Mean Cell Hemoglobin Concentration : 33.6 g/dL  Auto Neutrophil # : 16.28 K/uL  Auto Lymphocyte # : 0.83 K/uL  Auto Monocyte # : 0.59 K/uL  Auto Eosinophil # : 0.03 K/uL  Auto Basophil # : 0.07 K/uL  Auto Neutrophil % : 91.1 %  Auto Lymphocyte % : 4.6 %  Auto Monocyte % : 3.3 %  Auto Eosinophil % : 0.2 %  Auto Basophil % : 0.4 %        143  |  105  |  11  ----------------------------<  116<H>  4.2   |  24  |  0.7    Ca    10.0      2022 10:15  Mg     1.7         TPro  7.0  /  Alb  4.4  /  TBili  0.5  /  DBili  x   /  AST  16  /  ALT  20  /  AlkPhos  105  -17    LIVER FUNCTIONS - ( 2022 10:15 )  Alb: 4.4 g/dL / Pro: 7.0 g/dL / ALK PHOS: 105 U/L / ALT: 20 U/L / AST: 16 U/L / GGT: x             Urinalysis Basic - ( 2022 16:01 )    Color: Yellow / Appearance: Slightly Turbid / S.026 / pH: x  Gluc: x / Ketone: Negative  / Bili: Negative / Urobili: <2 mg/dL   Blood: x / Protein: 30 mg/dL / Nitrite: Negative   Leuk Esterase: Negative / RBC: x / WBC x   Sq Epi: x / Non Sq Epi: x / Bacteria: x          Neuroimaging:  NCHCT: CT Head No Cont:   ACC: [id number] EXAM:  CT BRAIN                          IMPRESSION:  As correlated with the brain MRI 2022:    1.  Resolving hematoma within the right occipital lobe (hematoma no   longer visualized, edema has decreased).    2.  No new intracranial hemorrhage or evidence of infarct.    --- End of Report ---            ELIDA RIZZO MD; Attending Radiologist  This document has been electronically signed. 2022 11:46AM (22 @ 11:36)      22 @ 16:27

## 2022-06-17 NOTE — ED ADULT NURSE NOTE - CAS DISCH ACCOMP BY
Spinal Block    Patient location during procedure: OR  Start time: 8/8/2019 7:31 AM  End time: 8/8/2019 7:34 AM  Reason for block: primary anesthetic    Staffing:  Performing  Anesthesiologist: Austin Young MD    Preanesthetic Checklist  Completed: patient identified, risks, benefits, and alternatives discussed, timeout performed, consent obtained, airway assessed, oxygen available, suction available, emergency drugs available and hand hygiene performed  Spinal Block  Patient position: sitting  Prep: ChloraPrep and site prepped and draped  Patient monitoring: blood pressure, continuous pulse ox, cardiac monitor and heart rate  Approach: midline  Location: L2-3  Injection technique: single-shot  Needle type: pencil-tip   Needle gauge: 24 G    Assessment  Sensory level: T6    Additional Notes:  3cc 1% lidocaine skin,+csf            Self

## 2022-06-17 NOTE — ED PROVIDER NOTE - NSICDXPASTMEDICALHX_GEN_ALL_CORE_FT
PAST MEDICAL HISTORY:  Bicornuate uterus     Migraine headache     Obesity     PCOS (polycystic ovarian syndrome)     TIA (transient ischemic attack) [facility]-4/2022-inf lat visual field loss

## 2022-06-17 NOTE — ED PROVIDER NOTE - ATTENDING CONTRIBUTION TO CARE
30 yo F with PMHx of Migraines, Intracranial Hemorrhage, s/p diagnostic cerebral angio yesterday by Dr. Castro  pt presents for eval of headache. pt states she did not eat for the procedure and was nauseous after. pt states she did not take her night time migraine meds because she did not have an appetite. pt states today, headache was worse than normal w/ associated nausea, vomiting. pt denies fevers/chills/cp/ap/dysuria.  no pain from R groin site.  no new visual complaints (old L lower hemianopsia)    vss  gen- uncomfortable appearing, aaox3  Eyes- EOMI/PERRL  card-rrr  lungs-ctab, no wheezing or rhonchi  abd-sntnd, no guarding or rebound  neuro- full str/sensation, cn ii-xii grossly intact, normal coordination  Spine- no midline c/t/l spine ttp, neck supple, FROM to neck, neck supple  Skin- R groin access site w/o erythema/tenderness/fluctuance    headache- given hx, will get interval cth, r/o acute bleed/mass effect vs migraine  migraine cocktail  screening labs  c/s neurovascular given recent procedure

## 2022-06-17 NOTE — ED PROVIDER NOTE - PATIENT PORTAL LINK FT
You can access the FollowMyHealth Patient Portal offered by [facility] by registering at the following website: [email or web address]. By joining [employer]’s FollowMyHealth portal, you will also be able to view your health information using other applications (apps) compatible with our system.

## 2022-06-17 NOTE — ED PROVIDER NOTE - OBJECTIVE STATEMENT
30 yo F with PMHx of Migraines, recent Intracranial Hemorrhage, s/p cerebral angiogram with Dr Castro yesterday who presents with progressive headache.  Right-sided throbbing in nature.  Associated with nausea and vomiting.  Patient did not take her migraine medication because she does not have appetite.  Patient denies fevers, chills, chest pain, shortness of breath, focal weakness, new blurry vision, numbness, tingling.

## 2022-06-17 NOTE — CONSULT NOTE ADULT - ATTENDING COMMENTS
PT WAS SEEN AND EXAMINED. PLAN OF MANAGEMENT WAS DISCUSSED WITH THE PATIENT AND ED ATTENDING, AS BRIEFED ABOVE BY CLARE.

## 2022-06-17 NOTE — ED ADULT NURSE NOTE - CHIEF COMPLAINT QUOTE
Pt here AAOX3 c/o headache and nausea after angiogram yesterday. Reports hx of bleed but followup angiogram yesterday was normal . GCS 15.

## 2022-06-17 NOTE — ED ADULT TRIAGE NOTE - CHIEF COMPLAINT QUOTE
Pt here AAOX3 c/o headache and nausea after angioplasty yesterday. Reports hx of bleed . Pt here AAOX3 c/o headache and nausea after angiogram yesterday. Reports hx of bleed but followup angiogram yesterday was normal . GCS 15.

## 2022-06-17 NOTE — ED PROVIDER NOTE - NS ED ROS FT
Review of Systems:  CONSTITUTIONAL: No fever, No diaphoresis, No generalized weakness  SKIN: No rash  HEMATOLOGIC: No abnormal bleeding or bruising  EYES: No eye pain, No blurred vision  ENT: No change in hearing, No sore throat, No neck pain, No rhinorrhea, No ear pain  RESPIRATORY: No shortness of breath, No cough  CARDIAC: No chest pain, No palpitations  GI: No abdominal pain, No nausea, No vomiting, No diarrhea, No constipation  MUSCULOSKELETAL: No joint paint, No swelling, No back pain  NEUROLOGIC: No numbness, No focal weakness, + headache, No dizziness  All other systems negative, unless specified in HPI

## 2022-06-17 NOTE — ED ADULT TRIAGE NOTE - BEFAST SPEECH SLURRED
Patient is needing refills for her Spiriva to be sent to [facility] mail order.   Please approve   No

## 2022-06-17 NOTE — ED PROVIDER NOTE - CARE PROVIDER_API CALL
Quentin Castro)  Neurology; Vascular Neurology  [address] [place] [address], [address] [place] [id number]  Phone: [phone]  Fax: [phone]  Follow Up Time: 1-3 Days

## 2022-06-17 NOTE — ED PROVIDER NOTE - PROGRESS NOTE DETAILS
co- case d/w dr sterling- pending repeat cbc, reassess, dispo labs w/ leukocytosis, oral temp borderline, pt denies infectious complaints and clinically is improving. case. repeat temp nml. case d/w dr. valderrama who states from neuro standpoint, pt can be cleared. some worsening of headache and nausea can occur after endovasc procedure from contrast. overall, sx improving w/ reglan and compazine. his only rec is to repeat cbc given elevation. cxr clear and ua negative AH - repeat cbc improved, pt feeling much better, exam unchanged, will f/u; Patient to be discharged from ED. Any available test results were discussed with patient and/or family. Verbal instructions given, including instructions to return to ED immediately for any new, worsening, or concerning symptoms. Strict return precautions given. Patient endorsed understanding. Written discharge instructions additionally given, including follow-up plan

## 2022-06-17 NOTE — CONSULT NOTE ADULT - ASSESSMENT
Impression:  Patient is a 31 year old woman with a PMH of IP 4/2022, PCOS, and migraines. Patient is one day post uneventful diagnostic cerebral angiogram. Patient reports a mild frontal headache last evening with associated nausea and vomiting. While in ED found to have elevated WBC(17) and mildly febrile (99.3). CT head was stable. NIHSS 0. Patient reports feeling back to her baseline, denies chills.     Suggestion:  -Repeat WBC and temperature.   -If within normal limits, from a neuroendovascular perspective  can follow up in the office at her scheduled appointment.   -Discussed with ED attending.  Impression:  Patient is a 31 year old woman with a PMH of IPH 4/2022, PCOS, and migraines. Patient is one day post uneventful diagnostic cerebral angiogram. Patient reports a mild frontal headache last evening with associated nausea and vomiting. While in ED found to have elevated WBC(17) and mildly febrile (99.3). CT head was stable. NIHSS 0. Patient reports feeling back to her baseline, denies chills.     Suggestion:  -Repeat WBC and temperature.   -If the tests and exams within normal limits, from a neuroendovascular perspective  can follow up in the office at her scheduled appointment.   -Discussed with ED attending.

## 2022-06-22 LAB
CULTURE RESULTS: SIGNIFICANT CHANGE UP
CULTURE RESULTS: SIGNIFICANT CHANGE UP
SPECIMEN SOURCE: SIGNIFICANT CHANGE UP
SPECIMEN SOURCE: SIGNIFICANT CHANGE UP

## 2022-06-23 ENCOUNTER — NON-APPOINTMENT (OUTPATIENT)
Age: 32
End: 2022-06-23

## 2022-06-24 ENCOUNTER — APPOINTMENT (OUTPATIENT)
Dept: NEUROLOGY | Facility: CLINIC | Age: 32
End: 2022-06-24

## 2022-06-24 VITALS
DIASTOLIC BLOOD PRESSURE: 85 MMHG | TEMPERATURE: 97.6 F | HEART RATE: 107 BPM | HEIGHT: 61 IN | BODY MASS INDEX: 38.33 KG/M2 | SYSTOLIC BLOOD PRESSURE: 124 MMHG | WEIGHT: 203 LBS | OXYGEN SATURATION: 99 %

## 2022-06-24 DIAGNOSIS — I61.8 OTHER NONTRAUMATIC INTRACEREBRAL HEMORRHAGE: ICD-10-CM

## 2022-06-24 PROCEDURE — 99214 OFFICE O/P EST MOD 30 MIN: CPT

## 2022-06-28 ENCOUNTER — APPOINTMENT (OUTPATIENT)
Dept: NEUROLOGY | Facility: CLINIC | Age: 32
End: 2022-06-28

## 2022-07-19 NOTE — ASSESSMENT
[FreeTextEntry1] : Patient is 32 yo RH woman, never smoker, with FMHx of Hemophilia A and PMHx of migraines, pre-eclampsia (normotensive afterwards w/o meds) and PCOS who presents after repeat diagnostic cerebral angiogram on 6/16/22 to evaluate for intracranial vessel abnormality that may have caused R occipital intraparenchymal hemorrhage found during work up for sudden, unusual, severe HA in 4/2022. Repeat cerebral angiogram showed small early shunting about 2.5 sec of the right parieto-occipital artery which seemed WORSE in comparison to the previous cerebral DSA. Her case was discussed at [facility] and she was thought to have a Pial AV fistula which could be an explanation for her IPH. In the largest retrospective study of 17 similar case reports, there was a high risk of bleeding but the veins of the patients in the sample group were much more dilated than in our patient. We discussed recommendations for treatment, with the option of Edmond embolization of the fistula or open microsurgery. [facility] I have reviewed the imaging in detail and spoken extensively with the patient in layman language.  All of the patient's questions were answered to satisfaction. Right groin access site is c/d/i. There is no erythema, edema, ecchymosis, or tenderness to palpation. Right pedal pulse is palpable.\par \par PLAN:\par -Follow up at [facility] and let us know her decision. We'd be happy to provide any information that she needs.\par -BP goal <140/80\par -Instructed patient to call 911 or report to ED if any acute neurological changes occur, such as having severe, sudden, unusual headache\par \par

## 2022-07-19 NOTE — DATA REVIEWED
[de-identified] : FINDINGS: \par -B/L VA runs showed a significant early shunting (2.5 second) of the right parieto-occipital artery to the cortical venous system. This shunting was about 1 second faster than previous DSA on 4/28/22, and about two seconds earlier than contralateral side. The caliber of the right Parieto-occipital artery seems to be at least twice of the contralateral side. No vascular irregularity of the caliber of this vessel seen. No obvious nidus at the place of shunting to cortical vein seen. There are two cortical veins draining the fistula. The main one is draining into superior sagittal sinus, and the other one seems to be draining into interhemispheric veins. The feeder of the shunt seems to be mainly right parieto-occipital artery end branche/s, close to dome of parieto-occipital cortex. No venous stagnation or aneurysm noticed. The right VA seemed to be the dominant one. \par -The right CCA runs revealed a small indentation in distal carotid bulb, likely representing a blunt web. \par -The right ICA runs failed to show significant vascular abnormality. \par -The right ECA runs failed to show significant vascular abnormality. \par -The left CCA runs failed to show significant vascular abnormality in the cervical region. \par -The left ICA runs failed to show significant vascular abnormality, and the ophthalmic artery was not seen during these runs. \par -The left ECA runs failed to show significant vascular abnormality, and the Ophthalmic artery is being supplied by this vessel. \par \par VENOUS PHASE: No significant other venous abnormality seen.

## 2022-07-19 NOTE — PHYSICAL EXAM
[FreeTextEntry1] : Right groin access site is c/d/i. There is no erythema, edema, ecchymosis, or tenderness to palpation. Right pedal pulse is palpable.\par \par NIH STROKE SCALE \par \par Item	 Score \par \par 1 a.	Level of Consciousness	 0 \par 1 b. LOC Questions	 0 \par 1 c.	LOC Commands	 0 \par 2.	Best Gaze	 1 \par 3.	Visual	 0 \par 4.	Facial Palsy 0 \par 5 a.	Motor Arm - Left	 0 \par 5 b.	Motor Arm - Right	 0 \par 6 a.	Motor Leg - Left	 0 \par 6 b.	Motor Leg - Right	 0 \par 7.	Limb Ataxia	 0 \par 8.	Sensory	 0 \par 9.	Language	 0 \par 10.	Dysarthria	 0 \par 11.	Extinction and Inattention 	 0 \par __________________________________________ \par \par TOTAL	 1 \par \par mRS: 0 No symptoms at all \par \par \par Neurologic Exam: \par \par Mental status: Awake, alert and oriented x 4. Recent and remote memory intact. \par \par Language: Follows all commands. Naming, repetition and comprehension intact. Attention/concentration intact. No dysarthria, no aphasia. Fund of knowledge appropriate. \par \par Cranial nerves: Pupils equally round and reactive to light, Left eye lateral inferior quadrant defect. No nystagmus, EOMI, face symmetric, hearing intact bilaterally, palate elevation symmetric, tongue was midline, sternocleidomastoid/ shoulder shrug strength bilaterally 5/5. \par \par Motor: No drifting in all extremities. Normal bulk and tone, strength 5/5 in bilateral upper and lower extremities.  strength 5/5. \par \par Sensation: Intact to light touch. No neglect. \par \par Coordination: No dysmetria on finger-to-nose and heel-to-shin. \par \par Gait: Steady.

## 2022-07-19 NOTE — END OF VISIT
[FreeTextEntry3] : PT WITH THE PARIETO-OCCIPITAL PIAL FISTULA. I REVIEWED THE IMAGES AND FINDINGS IN DETAIL WITH THE PATIENT AND EXPLAINED IN DETAIL IN LAYMAN LANGUAGE THE FINDINGS TO HER SATISFACTION. PLAN OF MANAGEMENT AS ABOVE.

## 2022-07-28 ENCOUNTER — APPOINTMENT (OUTPATIENT)
Dept: NEUROSURGERY | Facility: CLINIC | Age: 32
End: 2022-07-28

## 2022-07-28 VITALS
TEMPERATURE: 98.6 F | OXYGEN SATURATION: 98 % | SYSTOLIC BLOOD PRESSURE: 125 MMHG | DIASTOLIC BLOOD PRESSURE: 86 MMHG | HEART RATE: 100 BPM | WEIGHT: 190 LBS | HEIGHT: 62 IN | BODY MASS INDEX: 34.96 KG/M2

## 2022-07-28 DIAGNOSIS — I67.1 CEREBRAL ANEURYSM, NONRUPTURED: ICD-10-CM

## 2022-07-28 PROCEDURE — 99203 OFFICE O/P NEW LOW 30 MIN: CPT

## 2022-08-02 NOTE — DATA REVIEWED
[de-identified] : FINDINGS: \par -B/L VA runs showed a significant early shunting (2.5 second) of the right parieto-occipital artery to the cortical venous system. This shunting was about 1 second faster than previous DSA on 4/28/22, and about two seconds earlier than contralateral side. The caliber of the right Parieto-occipital artery seems to be at least twice of the contralateral side. No vascular irregularity of the caliber of this vessel seen. No obvious nidus at the place of shunting to cortical vein seen. There are two cortical veins draining the fistula. The main one is draining into superior sagittal sinus, and the other one seems to be draining into interhemispheric veins. The feeder of the shunt seems to be mainly right parieto-occipital artery end branche/s, close to dome of parieto-occipital cortex. No venous stagnation or aneurysm noticed. The right VA seemed to be the dominant one. \par -The right CCA runs revealed a small indentation in distal carotid bulb, likely representing a blunt web. \par -The right ICA runs failed to show significant vascular abnormality. \par -The right ECA runs failed to show significant vascular abnormality. \par -The left CCA runs failed to show significant vascular abnormality in the cervical region. \par -The left ICA runs failed to show significant vascular abnormality, and the ophthalmic artery was not seen during these runs. \par -The left ECA runs failed to show significant vascular abnormality, and the Ophthalmic artery is being supplied by this vessel. \par \par VENOUS PHASE: No significant other venous abnormality seen.

## 2022-08-02 NOTE — ASSESSMENT
[FreeTextEntry1] : PLAN\par \par Reviewed images and discussed\par Would recommend Cerebral angiogram intent to treat  ? glue placment\par Risks and benefits discussed with pt and father\par Continue current medical regime\par Would need medical clearance and covid test \par \par \par I, Dr. Galindo, personally performed the evaluation and management (E/M) services for this new patient. That E/M includes conducting the initial examination, assessing all conditions, and establishing the plan of care. Today, my ACP, Katie Willis, was here to observe my evaluation and management services for this patient to be followed going forward.\par \par

## 2022-08-02 NOTE — PHYSICAL EXAM
[General Appearance - Alert] : alert [Oriented To Time, Place, And Person] : oriented to person, place, and time [I: Normal Smell] : smell intact bilaterally [Cranial Nerves Oculomotor (III)] : extraocular motion intact [Cranial Nerves Trigeminal (V)] : facial sensation intact symmetrically [Cranial Nerves Facial (VII)] : face symmetrical [Cranial Nerves Vestibulocochlear (VIII)] : hearing was intact bilaterally [Cranial Nerves Glossopharyngeal (IX)] : tongue and palate midline [Cranial Nerves Accessory (XI - Cranial And Spinal)] : head turning and shoulder shrug symmetric [Cranial Nerves Hypoglossal (XII)] : there was no tongue deviation with protrusion [Motor Tone] : muscle tone was normal in all four extremities [Motor Strength] : muscle strength was normal in all four extremities [No Visual Abnormalities] : no visible abnormailities [FreeTextEntry5] : decrease left eye peripheal loss [FreeTextEntry1] : \par .

## 2022-08-02 NOTE — HISTORY OF PRESENT ILLNESS
[FreeTextEntry1] : Patient is 30 yo RH woman, never smoker, with FMHx of Hemophilia A and PMHx of migraines formerly on Imitrex, pre-eclampsia (normotensive afterwards w/o meds) and had  diagnostic cerebral angiogram on 6/16/22 to evaluate for intracranial vessel abnormality that may have led to R occipital intraparenchymal hemorrhage found during work up for sudden, unusual, severe HA in 4/2022. Her initial diagnostic cerebral angiogram with Dr. Castro on 4/28/22 showed mild small early shunting of the R distal PCA into the venous system. This finding was in the same area of IPH and thought to be either primary abnormality or secondary to IPH.  \par \par Repeat cerebral angiogram in 6/16/2022 showed small early shunting of the right parieto-occipital artery which seemed WORSE in comparison to the previous cerebral DSA. This can be secondary to the regional IPH or indication of underlying vascular malformation. No significant beading of intracranial vessels noticed. \par \par Pt is here today to review images with Dr. Galindo and if any surgical intervention is required\par \par

## 2022-08-04 ENCOUNTER — NON-APPOINTMENT (OUTPATIENT)
Age: 32
End: 2022-08-04

## 2022-08-17 NOTE — OCCUPATIONAL THERAPY INITIAL EVALUATION ADULT - ADL RETRAINING, OT EVAL
Patient will perform upper body dressing independently by discharge. ; Patient will perform lower body dressing independently with use of appropriate adaptive equipment as needed by discharge. None

## 2022-08-22 ENCOUNTER — APPOINTMENT (OUTPATIENT)
Dept: MRI IMAGING | Facility: HOSPITAL | Age: 32
End: 2022-08-22

## 2022-08-22 ENCOUNTER — RESULT REVIEW (OUTPATIENT)
Age: 32
End: 2022-08-22

## 2022-08-22 ENCOUNTER — OUTPATIENT (OUTPATIENT)
Dept: OUTPATIENT SERVICES | Facility: HOSPITAL | Age: 32
LOS: 1 days | End: 2022-08-22
Payer: COMMERCIAL

## 2022-08-22 DIAGNOSIS — Z98.890 OTHER SPECIFIED POSTPROCEDURAL STATES: Chronic | ICD-10-CM

## 2022-08-22 PROCEDURE — A9585: CPT

## 2022-08-22 PROCEDURE — 70553 MRI BRAIN STEM W/O & W/DYE: CPT | Mod: 26

## 2022-08-22 PROCEDURE — 70553 MRI BRAIN STEM W/O & W/DYE: CPT

## 2022-08-25 ENCOUNTER — NON-APPOINTMENT (OUTPATIENT)
Age: 32
End: 2022-08-25

## 2022-08-29 ENCOUNTER — APPOINTMENT (OUTPATIENT)
Dept: NEUROSURGERY | Facility: HOSPITAL | Age: 32
End: 2022-08-29

## 2022-08-29 ENCOUNTER — TRANSCRIPTION ENCOUNTER (OUTPATIENT)
Age: 32
End: 2022-08-29

## 2022-08-29 ENCOUNTER — INPATIENT (INPATIENT)
Facility: HOSPITAL | Age: 32
LOS: 1 days | Discharge: HOME | DRG: 26 | End: 2022-08-31
Attending: NEUROLOGICAL SURGERY | Admitting: NEUROLOGICAL SURGERY
Payer: COMMERCIAL

## 2022-08-29 VITALS
OXYGEN SATURATION: 93 % | SYSTOLIC BLOOD PRESSURE: 123 MMHG | HEART RATE: 106 BPM | DIASTOLIC BLOOD PRESSURE: 63 MMHG | RESPIRATION RATE: 20 BRPM | TEMPERATURE: 98 F

## 2022-08-29 DIAGNOSIS — G43.909 MIGRAINE, UNSPECIFIED, NOT INTRACTABLE, WITHOUT STATUS MIGRAINOSUS: ICD-10-CM

## 2022-08-29 DIAGNOSIS — I67.1 CEREBRAL ANEURYSM, NONRUPTURED: ICD-10-CM

## 2022-08-29 DIAGNOSIS — Z98.890 OTHER SPECIFIED POSTPROCEDURAL STATES: Chronic | ICD-10-CM

## 2022-08-29 LAB
ANION GAP SERPL CALC-SCNC: 11 MMOL/L — SIGNIFICANT CHANGE UP (ref 5–17)
APTT BLD: 32.5 SEC — SIGNIFICANT CHANGE UP (ref 27.5–35.5)
BASE EXCESS BLDA CALC-SCNC: -1.7 MMOL/L — SIGNIFICANT CHANGE UP (ref -2–3)
BASOPHILS # BLD AUTO: 0.08 K/UL — SIGNIFICANT CHANGE UP (ref 0–0.2)
BASOPHILS NFR BLD AUTO: 0.7 % — SIGNIFICANT CHANGE UP (ref 0–2)
BUN SERPL-MCNC: 16 MG/DL — SIGNIFICANT CHANGE UP (ref 7–23)
CALCIUM SERPL-MCNC: 9.6 MG/DL — SIGNIFICANT CHANGE UP (ref 8.4–10.5)
CHLORIDE SERPL-SCNC: 102 MMOL/L — SIGNIFICANT CHANGE UP (ref 96–108)
CO2 BLDA-SCNC: 24 MMOL/L — SIGNIFICANT CHANGE UP (ref 19–24)
CO2 SERPL-SCNC: 27 MMOL/L — SIGNIFICANT CHANGE UP (ref 22–31)
CREAT SERPL-MCNC: 0.69 MG/DL — SIGNIFICANT CHANGE UP (ref 0.5–1.3)
EGFR: 117 ML/MIN/1.73M2 — SIGNIFICANT CHANGE UP
EOSINOPHIL # BLD AUTO: 0.28 K/UL — SIGNIFICANT CHANGE UP (ref 0–0.5)
EOSINOPHIL NFR BLD AUTO: 2.4 % — SIGNIFICANT CHANGE UP (ref 0–6)
GAS PNL BLDA: SIGNIFICANT CHANGE UP
GLUCOSE BLDC GLUCOMTR-MCNC: 144 MG/DL — HIGH (ref 70–99)
GLUCOSE BLDC GLUCOMTR-MCNC: 163 MG/DL — HIGH (ref 70–99)
GLUCOSE SERPL-MCNC: 96 MG/DL — SIGNIFICANT CHANGE UP (ref 70–99)
HCG SERPL-ACNC: <0 MIU/ML — SIGNIFICANT CHANGE UP
HCO3 BLDA-SCNC: 23 MMOL/L — SIGNIFICANT CHANGE UP (ref 21–28)
HCT VFR BLD CALC: 38.8 % — SIGNIFICANT CHANGE UP (ref 34.5–45)
HGB BLD-MCNC: 12.7 G/DL — SIGNIFICANT CHANGE UP (ref 11.5–15.5)
IMM GRANULOCYTES NFR BLD AUTO: 0.4 % — SIGNIFICANT CHANGE UP (ref 0–1.5)
INR BLD: 1 — SIGNIFICANT CHANGE UP (ref 0.88–1.16)
ISTAT ACTK (ACTIVATED CLOTTING TIME KAOLIN): 132 SEC — SIGNIFICANT CHANGE UP (ref 74–137)
ISTAT INR: 1.2 — HIGH (ref 0.88–1.16)
ISTAT PT: 13.9 SEC — HIGH (ref 10–12.9)
LYMPHOCYTES # BLD AUTO: 2.54 K/UL — SIGNIFICANT CHANGE UP (ref 1–3.3)
LYMPHOCYTES # BLD AUTO: 22.2 % — SIGNIFICANT CHANGE UP (ref 13–44)
MCHC RBC-ENTMCNC: 25.6 PG — LOW (ref 27–34)
MCHC RBC-ENTMCNC: 32.7 GM/DL — SIGNIFICANT CHANGE UP (ref 32–36)
MCV RBC AUTO: 78.2 FL — LOW (ref 80–100)
MONOCYTES # BLD AUTO: 0.67 K/UL — SIGNIFICANT CHANGE UP (ref 0–0.9)
MONOCYTES NFR BLD AUTO: 5.9 % — SIGNIFICANT CHANGE UP (ref 2–14)
NEUTROPHILS # BLD AUTO: 7.82 K/UL — HIGH (ref 1.8–7.4)
NEUTROPHILS NFR BLD AUTO: 68.4 % — SIGNIFICANT CHANGE UP (ref 43–77)
NRBC # BLD: 0 /100 WBCS — SIGNIFICANT CHANGE UP (ref 0–0)
PCO2 BLDA: 38 MMHG — HIGH (ref 32–35)
PH BLDA: 7.39 — SIGNIFICANT CHANGE UP (ref 7.35–7.45)
PLATELET # BLD AUTO: 492 K/UL — HIGH (ref 150–400)
PO2 BLDA: 89 MMHG — SIGNIFICANT CHANGE UP (ref 83–108)
POCT ISTAT CREATININE: 0.7 MG/DL — SIGNIFICANT CHANGE UP (ref 0.5–1.3)
POTASSIUM SERPL-MCNC: 3.9 MMOL/L — SIGNIFICANT CHANGE UP (ref 3.5–5.3)
POTASSIUM SERPL-SCNC: 3.9 MMOL/L — SIGNIFICANT CHANGE UP (ref 3.5–5.3)
PROTHROM AB SERPL-ACNC: 11.9 SEC — SIGNIFICANT CHANGE UP (ref 10.5–13.4)
RBC # BLD: 4.96 M/UL — SIGNIFICANT CHANGE UP (ref 3.8–5.2)
RBC # FLD: 14.5 % — SIGNIFICANT CHANGE UP (ref 10.3–14.5)
SAO2 % BLDA: 98.1 % — HIGH (ref 94–98)
SODIUM SERPL-SCNC: 140 MMOL/L — SIGNIFICANT CHANGE UP (ref 135–145)
WBC # BLD: 11.44 K/UL — HIGH (ref 3.8–10.5)
WBC # FLD AUTO: 11.44 K/UL — HIGH (ref 3.8–10.5)

## 2022-08-29 PROCEDURE — 99291 CRITICAL CARE FIRST HOUR: CPT

## 2022-08-29 PROCEDURE — 75898 FOLLOW-UP ANGIOGRAPHY: CPT | Mod: 26

## 2022-08-29 PROCEDURE — 71045 X-RAY EXAM CHEST 1 VIEW: CPT | Mod: 26,77

## 2022-08-29 PROCEDURE — 36226 PLACE CATH VERTEBRAL ART: CPT | Mod: RT

## 2022-08-29 PROCEDURE — 36228 PLACE CATH INTRACRANIAL ART: CPT | Mod: RT

## 2022-08-29 PROCEDURE — 61624 TCAT PERM OCCLS/EMBOLJ CNS: CPT

## 2022-08-29 PROCEDURE — 75894 X-RAYS TRANSCATH THERAPY: CPT | Mod: 26

## 2022-08-29 PROCEDURE — 99024 POSTOP FOLLOW-UP VISIT: CPT

## 2022-08-29 PROCEDURE — 36224 PLACE CATH CAROTD ART: CPT | Mod: RT

## 2022-08-29 PROCEDURE — 71045 X-RAY EXAM CHEST 1 VIEW: CPT | Mod: 26

## 2022-08-29 RX ORDER — IPRATROPIUM/ALBUTEROL SULFATE 18-103MCG
3 AEROSOL WITH ADAPTER (GRAM) INHALATION EVERY 6 HOURS
Refills: 0 | Status: COMPLETED | OUTPATIENT
Start: 2022-08-29 | End: 2022-08-30

## 2022-08-29 RX ORDER — TRAMADOL HYDROCHLORIDE 50 MG/1
50 TABLET ORAL EVERY 4 HOURS
Refills: 0 | Status: DISCONTINUED | OUTPATIENT
Start: 2022-08-29 | End: 2022-08-30

## 2022-08-29 RX ORDER — IPRATROPIUM/ALBUTEROL SULFATE 18-103MCG
3 AEROSOL WITH ADAPTER (GRAM) INHALATION EVERY 6 HOURS
Refills: 0 | Status: DISCONTINUED | OUTPATIENT
Start: 2022-08-29 | End: 2022-08-29

## 2022-08-29 RX ORDER — SODIUM CHLORIDE 9 MG/ML
1000 INJECTION INTRAMUSCULAR; INTRAVENOUS; SUBCUTANEOUS
Refills: 0 | Status: DISCONTINUED | OUTPATIENT
Start: 2022-08-29 | End: 2022-08-29

## 2022-08-29 RX ORDER — ACETAMINOPHEN 500 MG
1000 TABLET ORAL ONCE
Refills: 0 | Status: COMPLETED | OUTPATIENT
Start: 2022-08-29 | End: 2022-08-29

## 2022-08-29 RX ORDER — DIVALPROEX SODIUM 500 MG/1
250 TABLET, DELAYED RELEASE ORAL DAILY
Refills: 0 | Status: DISCONTINUED | OUTPATIENT
Start: 2022-08-30 | End: 2022-08-31

## 2022-08-29 RX ORDER — NICARDIPINE HYDROCHLORIDE 30 MG/1
5 CAPSULE, EXTENDED RELEASE ORAL
Qty: 40 | Refills: 0 | Status: DISCONTINUED | OUTPATIENT
Start: 2022-08-29 | End: 2022-08-30

## 2022-08-29 RX ORDER — PANTOPRAZOLE SODIUM 20 MG/1
40 TABLET, DELAYED RELEASE ORAL
Refills: 0 | Status: DISCONTINUED | OUTPATIENT
Start: 2022-08-29 | End: 2022-08-31

## 2022-08-29 RX ORDER — METOCLOPRAMIDE HCL 10 MG
10 TABLET ORAL EVERY 4 HOURS
Refills: 0 | Status: DISCONTINUED | OUTPATIENT
Start: 2022-08-29 | End: 2022-08-31

## 2022-08-29 RX ORDER — ACETAMINOPHEN 500 MG
1000 TABLET ORAL EVERY 6 HOURS
Refills: 0 | Status: DISCONTINUED | OUTPATIENT
Start: 2022-08-29 | End: 2022-08-30

## 2022-08-29 RX ORDER — CHLORHEXIDINE GLUCONATE 213 G/1000ML
1 SOLUTION TOPICAL ONCE
Refills: 0 | Status: DISCONTINUED | OUTPATIENT
Start: 2022-08-29 | End: 2022-08-31

## 2022-08-29 RX ORDER — TRAMADOL HYDROCHLORIDE 50 MG/1
25 TABLET ORAL EVERY 4 HOURS
Refills: 0 | Status: DISCONTINUED | OUTPATIENT
Start: 2022-08-29 | End: 2022-08-30

## 2022-08-29 RX ORDER — DEXAMETHASONE 0.5 MG/5ML
2 ELIXIR ORAL EVERY 6 HOURS
Refills: 0 | Status: DISCONTINUED | OUTPATIENT
Start: 2022-08-30 | End: 2022-08-30

## 2022-08-29 RX ORDER — DEXAMETHASONE 0.5 MG/5ML
4 ELIXIR ORAL EVERY 6 HOURS
Refills: 0 | Status: COMPLETED | OUTPATIENT
Start: 2022-08-29 | End: 2022-08-30

## 2022-08-29 RX ORDER — INSULIN LISPRO 100/ML
VIAL (ML) SUBCUTANEOUS
Refills: 0 | Status: DISCONTINUED | OUTPATIENT
Start: 2022-08-29 | End: 2022-08-31

## 2022-08-29 RX ORDER — CHLORHEXIDINE GLUCONATE 213 G/1000ML
1 SOLUTION TOPICAL
Refills: 0 | Status: DISCONTINUED | OUTPATIENT
Start: 2022-08-29 | End: 2022-08-31

## 2022-08-29 RX ORDER — NORTRIPTYLINE HYDROCHLORIDE 10 MG/1
10 CAPSULE ORAL AT BEDTIME
Refills: 0 | Status: DISCONTINUED | OUTPATIENT
Start: 2022-08-29 | End: 2022-08-31

## 2022-08-29 RX ADMIN — TRAMADOL HYDROCHLORIDE 50 MILLIGRAM(S): 50 TABLET ORAL at 22:04

## 2022-08-29 RX ADMIN — Medication 400 MILLIGRAM(S): at 11:17

## 2022-08-29 RX ADMIN — TRAMADOL HYDROCHLORIDE 50 MILLIGRAM(S): 50 TABLET ORAL at 17:17

## 2022-08-29 RX ADMIN — Medication 1000 MILLIGRAM(S): at 19:54

## 2022-08-29 RX ADMIN — Medication 3 MILLILITER(S): at 16:00

## 2022-08-29 RX ADMIN — NICARDIPINE HYDROCHLORIDE 25 MG/HR: 30 CAPSULE, EXTENDED RELEASE ORAL at 11:30

## 2022-08-29 RX ADMIN — Medication 1 DROP(S): at 22:03

## 2022-08-29 RX ADMIN — Medication 4 MILLIGRAM(S): at 19:58

## 2022-08-29 RX ADMIN — NICARDIPINE HYDROCHLORIDE 25 MG/HR: 30 CAPSULE, EXTENDED RELEASE ORAL at 20:10

## 2022-08-29 RX ADMIN — Medication 4 MILLIGRAM(S): at 14:45

## 2022-08-29 RX ADMIN — Medication 1000 MILLIGRAM(S): at 11:41

## 2022-08-29 RX ADMIN — TRAMADOL HYDROCHLORIDE 50 MILLIGRAM(S): 50 TABLET ORAL at 18:15

## 2022-08-29 RX ADMIN — TRAMADOL HYDROCHLORIDE 50 MILLIGRAM(S): 50 TABLET ORAL at 23:58

## 2022-08-29 RX ADMIN — NICARDIPINE HYDROCHLORIDE 25 MG/HR: 30 CAPSULE, EXTENDED RELEASE ORAL at 17:17

## 2022-08-29 RX ADMIN — PANTOPRAZOLE SODIUM 40 MILLIGRAM(S): 20 TABLET, DELAYED RELEASE ORAL at 15:09

## 2022-08-29 RX ADMIN — Medication 2: at 15:16

## 2022-08-29 RX ADMIN — Medication 10 MILLIGRAM(S): at 22:03

## 2022-08-29 RX ADMIN — Medication 1000 MILLIGRAM(S): at 19:06

## 2022-08-29 RX ADMIN — SODIUM CHLORIDE 100 MILLILITER(S): 9 INJECTION INTRAMUSCULAR; INTRAVENOUS; SUBCUTANEOUS at 11:25

## 2022-08-29 RX ADMIN — NORTRIPTYLINE HYDROCHLORIDE 10 MILLIGRAM(S): 10 CAPSULE ORAL at 21:25

## 2022-08-29 NOTE — PROGRESS NOTE ADULT - ASSESSMENT
31y/F with  Bicornuate uterus    Migraine headache    TIA (transient ischemic attack)    PCOS (polycystic ovarian syndrome)    Obesity    Cerebrovascular dural AV fistula        PLAN:   NEURO:  REHAB:  physical therapy evaluation and management    EARLY MOB:      PULM:  Room air, incentive spirometry  CARDIO:  SBP goal 100-150mm Hg  ENDO:  Blood sugar goals 140-180 mg/dL, continue insulin sliding scale  GI:  PPI for GI prophylaxis  DIET:  RENAL:    HEM/ONC:  VTE Prophylaxis:     ID: afebrile, no leukocytosis  ====================   T(F): , Max: 97.8 (08-29-22 @ 10:30)    WBC Count: 11.44 (08-29)    ====================  Social: will update family    Active issues:  What's keeping patient in the ICU?  What is this patient's dispo plan?      ATTENDING ATTESTATION:  I was physically present for the key portions of the evaluation and management (E/M) service provided.  I agree with the above history, physical and plan, which I have reviewed and edited where appropriate.    Patient at high risk for neurological deterioration or death due to:  ICU delirium, aspiration PNA, DVT / PE.  Critical care time:  I have personally provided 45 minutes of critical care time, excluding time spent on separate procedures.      Plan discussed with RN, house staff. 31y/F with  cerebral AVM, brain compression  migraine  h/o ICH  h/o L inferior homonymous quadrantanopsia  h/o TIA  PCOS    PLAN:   NEURO: neurochecks q1h, PRN pain meds with acetaminophen, opiates  SBP control, steroids as per INR  REHAB:  physical therapy evaluation and management    EARLY MOB:  bedrest    PULM:  NRB, incentive spirometry  CARDIO:  SBP goal  mm Hg  ENDO:  Blood sugar goals 140-180 mg/dL, continue insulin sliding scale  GI:  PPI for GI prophylaxis  DIET: advance as tolerated  RENAL:  d/c IVF once eating well  HEM/ONC: check post-procedure Hb  VTE Prophylaxis: SCDs, no DVT chemoprophylaxis for now as patient is high risk for bleed  ID: afebrile, leukocytosis  Social: will update family    Active issues:  What's keeping patient in the ICU?  What is this patient's dispo plan?    ATTENDING ATTESTATION:  I was physically present for the key portions of the evaluation and management (E/M) service provided.  I agree with the above history, physical and plan, which I have reviewed and edited where appropriate.    Patient at high risk for neurological deterioration or death due to:  ICU delirium, aspiration PNA, DVT / PE.  Critical care time:  I have personally provided 45 minutes of critical care time, excluding time spent on separate procedures.      Plan discussed with RN, house staff.

## 2022-08-29 NOTE — H&P ADULT - NSICDXPASTMEDICALHX_GEN_ALL_CORE_FT
PAST MEDICAL HISTORY:  Bicornuate uterus     Cerebrovascular dural AV fistula     Migraine headache     Obesity     PCOS (polycystic ovarian syndrome)     TIA (transient ischemic attack) [facility]-4/2022-inf lat visual field loss

## 2022-08-29 NOTE — H&P ADULT - HISTORY OF PRESENT ILLNESS
30 y/o right handed female, never smoker, pmhx migraines, pre-eclampsia presenting for cerebral angiogram with endovascular embolization of right PCA fistula. Pt presented with a sudden, unusual, severe HA and found to have R occipital intraparenchymal hemorrhage (4/2022). She had a diagnostic angiogram on 4/28/22 with Dr. Castro which showed mild small early shunting of the R distal PCA into the venous system. This finding was in the same area of IPH and thought to be either primary abnormality or secondary to IPH.   Repeat cerebral angiogram on 6/16/2022 which showed small early shunting of the right parieto-occipital artery which seemed worse in comparison to the previous cerebral DSA. Pt presents today for treatment of right PCA fistula.  30 y/o right handed female, never smoker, pmhx migraines, pre-eclampsia presenting for cerebral angiogram with endovascular embolization of right PCA fistula. Pt presented with a sudden, unusual, severe HA and found to have R occipital intraparenchymal hemorrhage (4/2022). She had a diagnostic angiogram on 4/28/22 with Dr. Castro which showed mild small early shunting of the R distal PCA into the venous system. This finding was in the same area of IPH and thought to be either primary abnormality or secondary to IPH.   Repeat cerebral angiogram on 6/16/2022 which showed small early shunting of the right parieto-occipital artery which seemed worse in comparison to the previous cerebral DSA. Pt presents today for treatment of right PCA fistula. Patient reports that left lower visual field cut has improved. Has had neuro-opthalmology exam which was normal. Has intermittent headaches with aura which she takes depakote and nortriptyline.

## 2022-08-29 NOTE — PROGRESS NOTE ADULT - SUBJECTIVE AND OBJECTIVE BOX
NEUROSURGERY POST OP NOTE:    POD# 0 S/P cerebral angiogram for glue NBCA embolization of R     S:       T(C): 36.6 (08-29-22 @ 12:24), Max: 36.6 (08-29-22 @ 10:30)  HR: 106 (08-29-22 @ 15:00) (96 - 117)  BP: 122/55 (08-29-22 @ 12:24) (114/56 - 130/61)  RR: 22 (08-29-22 @ 15:00) (10 - 23)  SpO2: 96% (08-29-22 @ 15:00) (89% - 96%)      08-29-22 @ 07:01  -  08-29-22 @ 15:58  --------------------------------------------------------  IN: 1385 mL / OUT: 540 mL / NET: 845 mL        albuterol/ipratropium for Nebulization 3 milliLiter(s) Nebulizer every 6 hours PRN  chlorhexidine 2% Cloths 1 Application(s) Topical <User Schedule>  chlorhexidine 4% Liquid 1 Application(s) Topical once  dexAMETHasone     Tablet 4 milliGRAM(s) Oral every 6 hours  insulin lispro (ADMELOG) corrective regimen sliding scale   SubCutaneous Before meals and at bedtime  niCARdipine Infusion 5 mG/Hr IV Continuous <Continuous>  nortriptyline 10 milliGRAM(s) Oral at bedtime  pantoprazole    Tablet 40 milliGRAM(s) Oral before breakfast  sodium chloride 0.9%. 1000 milliLiter(s) IV Continuous <Continuous>      RADIOLOGY:     Exam:    WOUND/DRAINS:    DEVICES:       Assessment: 31yFemale      Plan:      Assessment:  Present when checked    []  GCS  E   V  M     Heart Failure: []Acute, [] acute on chronic , []chronic  Heart Failure:  [] Diastolic (HFpEF), [] Systolic (HFrEF), []Combined (HFpEF and HFrEF), [] RHF, [] Pulm HTN, [] Other    [] HILARIO, [] ATN, [] AIN, [] other  [] CKD1, [] CKD2, [] CKD 3, [] CKD 4, [] CKD 5, []ESRD    Encephalopathy: [] Metabolic, [] Hepatic, [] toxic, [] Neurological, [] Other    Abnormal Nurtitional Status: [] malnurtition (see nutrition note), [ ]underweight: BMI < 19, [] morbid obesity: BMI >40, [] Cachexia    [] Sepsis  [] hypovolemic shock,[] cardiogenic shock, [] hemorrhagic shock, [] neuogenic shock  [] Acute Respiratory Failure  []Cerebral edema, [] Brain compression/ herniation,   [] Functional quadriplegia  [] Acute blood loss anemia     NEUROSURGERY POST OP NOTE:    POD# 0 S/P cerebral angiogram for glue NBCA embolization of AVM from R 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     S:   Patient seen and examined at bedside in PACU. Pt on 6L NC O2 sat low 90s/high 80s. Patient denies SOB or CP, states she feels that her breathing is fine. Not in any apparent respiratory distress.     T(C): 36.6 (08-29-22 @ 12:24), Max: 36.6 (08-29-22 @ 10:30)  HR: 106 (08-29-22 @ 15:00) (96 - 117)  BP: 122/55 (08-29-22 @ 12:24) (114/56 - 130/61)  RR: 22 (08-29-22 @ 15:00) (10 - 23)  SpO2: 96% (08-29-22 @ 15:00) (89% - 96%)      08-29-22 @ 07:01  -  08-29-22 @ 15:58  --------------------------------------------------------  IN: 1385 mL / OUT: 540 mL / NET: 845 mL        albuterol/ipratropium for Nebulization 3 milliLiter(s) Nebulizer every 6 hours PRN  chlorhexidine 2% Cloths 1 Application(s) Topical <User Schedule>  chlorhexidine 4% Liquid 1 Application(s) Topical once  dexAMETHasone     Tablet 4 milliGRAM(s) Oral every 6 hours  insulin lispro (ADMELOG) corrective regimen sliding scale   SubCutaneous Before meals and at bedtime  niCARdipine Infusion 5 mG/Hr IV Continuous <Continuous>  nortriptyline 10 milliGRAM(s) Oral at bedtime  pantoprazole    Tablet 40 milliGRAM(s) Oral before breakfast  sodium chloride 0.9%. 1000 milliLiter(s) IV Continuous <Continuous>      RADIOLOGY:     Exam:  General: NAD, pt is comfortably sitting up in bed, A&O x3, on 6L NC   HEENT: CN II-XII grossly intact, PERRL 3mm, EOMI b/l, face symmetric, tongue midline, neck FROM  Cardiovascular: RRR, normal S1 and S2   Respiratory: lungs CTAB, no wheezing, rhonchi, or crackles   GI: normoactive BS to auscultation, abd soft, NTND   Neuro: no aphasia, speech clear, no dysmetria, no pronator drift  strength 5/5 throughout all 4 extremities  sensation intact to light touch throughout   Extremities: distal pulses 2+ x4   Wound/incision: R groin site dressing C/D/I, no evidence of hematoma       Assessment:   32 y/o right handed female with PMHx of migraines, pre-eclampsia presents for angio embolization of AVM 8/29/22. Now s/p cerebral angiogram for glue NBCA embolization of AVM from R PCA that drains to superior sagittal sinus (8/20/22).       Plan:        Assessment:  Present when checked    []  GCS  E   V  M     Heart Failure: []Acute, [] acute on chronic , []chronic  Heart Failure:  [] Diastolic (HFpEF), [] Systolic (HFrEF), []Combined (HFpEF and HFrEF), [] RHF, [] Pulm HTN, [] Other    [] HILARIO, [] ATN, [] AIN, [] other  [] CKD1, [] CKD2, [] CKD 3, [] CKD 4, [] CKD 5, []ESRD    Encephalopathy: [] Metabolic, [] Hepatic, [] toxic, [] Neurological, [] Other    Abnormal Nurtitional Status: [] malnurtition (see nutrition note), [ ]underweight: BMI < 19, [] morbid obesity: BMI >40, [] Cachexia    [] Sepsis  [] hypovolemic shock,[] cardiogenic shock, [] hemorrhagic shock, [] neuogenic shock  [] Acute Respiratory Failure  []Cerebral edema, [] Brain compression/ herniation,   [] Functional quadriplegia  [] Acute blood loss anemia     NEUROSURGERY POST OP NOTE:    POD# 0 S/P cerebral angiogram for glue NBCA embolization of AVM from R 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     S:   Patient seen and examined at bedside in PACU. Pt on 6L NC O2 sat low 90s/high 80s. Patient denies SOB or CP, states she feels that her breathing is fine. Not in any apparent respiratory distress.     T(C): 36.6 (08-29-22 @ 12:24), Max: 36.6 (08-29-22 @ 10:30)  HR: 106 (08-29-22 @ 15:00) (96 - 117)  BP: 122/55 (08-29-22 @ 12:24) (114/56 - 130/61)  RR: 22 (08-29-22 @ 15:00) (10 - 23)  SpO2: 96% (08-29-22 @ 15:00) (89% - 96%)      08-29-22 @ 07:01  -  08-29-22 @ 15:58  --------------------------------------------------------  IN: 1385 mL / OUT: 540 mL / NET: 845 mL        albuterol/ipratropium for Nebulization 3 milliLiter(s) Nebulizer every 6 hours PRN  chlorhexidine 2% Cloths 1 Application(s) Topical <User Schedule>  chlorhexidine 4% Liquid 1 Application(s) Topical once  dexAMETHasone     Tablet 4 milliGRAM(s) Oral every 6 hours  insulin lispro (ADMELOG) corrective regimen sliding scale   SubCutaneous Before meals and at bedtime  niCARdipine Infusion 5 mG/Hr IV Continuous <Continuous>  nortriptyline 10 milliGRAM(s) Oral at bedtime  pantoprazole    Tablet 40 milliGRAM(s) Oral before breakfast  sodium chloride 0.9%. 1000 milliLiter(s) IV Continuous <Continuous>      RADIOLOGY:     Exam:  General: NAD, pt is comfortably sitting up in bed, A&O x3, on 6L NC   HEENT: CN II-XII grossly intact, PERRL 3mm, EOMI b/l, face symmetric, tongue midline, neck FROM  Cardiovascular: RRR, normal S1 and S2   Respiratory: lungs CTAB, no wheezing, rhonchi, or crackles   GI: normoactive BS to auscultation, abd soft, NTND   Neuro: no aphasia, speech clear, no dysmetria, no pronator drift  strength 5/5 throughout all 4 extremities  sensation intact to light touch throughout   Extremities: distal pulses 2+ x4   Wound/incision: R groin site dressing C/D/I, no evidence of hematoma       Assessment:   32 y/o right handed female with PMHx of migraines, pre-eclampsia presents for angio embolization of AVM 8/29/22. Now s/p cerebral angiogram for glue NBCA embolization of AVM from R PCA that drains to superior sagittal sinus (8/20/22).       Plan:  NEURO  - neuro checks/vital signs q1hr   - decadron x3days   - cont home depakote 250mg qd  - cont home nortriptyline 10mg qd     CARDIO  - SBP goal   - cardene gtt    PULM  - nonrebreather, SpO2 goal >92%  - CXR post-op   - encourage IS    GI   - ADAT  - bowel regimen   - PPI while on steroids     RENAL/  - Na goal 135-145  - NS held at this time due to pulmonary congestion on CXR, pt tolerating PO diet     ENDO  - ISS while on steroids, f/u A1C    ID  - afebrile, trend leukocytosis    HEME  - SCDs for DVT ppx     DISPO: ICU status, full code, dispo pend     Assessment and Plan d/w Dr. Galindo and Dr. Dorman         Assessment:  Present when checked    []  GCS  E   V  M     Heart Failure: []Acute, [] acute on chronic , []chronic  Heart Failure:  [] Diastolic (HFpEF), [] Systolic (HFrEF), []Combined (HFpEF and HFrEF), [] RHF, [] Pulm HTN, [] Other    [] HILARIO, [] ATN, [] AIN, [] other  [] CKD1, [] CKD2, [] CKD 3, [] CKD 4, [] CKD 5, []ESRD    Encephalopathy: [] Metabolic, [] Hepatic, [] toxic, [] Neurological, [] Other    Abnormal Nurtitional Status: [] malnurtition (see nutrition note), [ ]underweight: BMI < 19, [] morbid obesity: BMI >40, [] Cachexia    [] Sepsis  [] hypovolemic shock,[] cardiogenic shock, [] hemorrhagic shock, [] neuogenic shock  [] Acute Respiratory Failure  []Cerebral edema, [] Brain compression/ herniation,   [] Functional quadriplegia  [] Acute blood loss anemia     NEUROSURGERY POST OP NOTE:    POD# 0 S/P cerebral angiogram for glue NBCA embolization of AVM from R 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     S:   Patient seen and examined at bedside in PACU. Pt on 6L NC O2 sat low 90s/high 80s. Patient denies SOB or CP, states she feels that her breathing is fine. Not in any apparent respiratory distress.       T(C): 36.6 (08-29-22 @ 12:24), Max: 36.6 (08-29-22 @ 10:30)  HR: 106 (08-29-22 @ 15:00) (96 - 117)  BP: 122/55 (08-29-22 @ 12:24) (114/56 - 130/61)  RR: 22 (08-29-22 @ 15:00) (10 - 23)  SpO2: 96% (08-29-22 @ 15:00) (89% - 96%)      08-29-22 @ 07:01  -  08-29-22 @ 15:58  --------------------------------------------------------  IN: 1385 mL / OUT: 540 mL / NET: 845 mL        albuterol/ipratropium for Nebulization 3 milliLiter(s) Nebulizer every 6 hours PRN  chlorhexidine 2% Cloths 1 Application(s) Topical <User Schedule>  chlorhexidine 4% Liquid 1 Application(s) Topical once  dexAMETHasone     Tablet 4 milliGRAM(s) Oral every 6 hours  insulin lispro (ADMELOG) corrective regimen sliding scale   SubCutaneous Before meals and at bedtime  niCARdipine Infusion 5 mG/Hr IV Continuous <Continuous>  nortriptyline 10 milliGRAM(s) Oral at bedtime  pantoprazole    Tablet 40 milliGRAM(s) Oral before breakfast  sodium chloride 0.9%. 1000 milliLiter(s) IV Continuous <Continuous>      RADIOLOGY:     Exam:  General: NAD, pt is comfortably sitting up in bed, A&O x3, on 6L NC   HEENT: CN II-XII grossly intact, PERRL 3mm, EOMI b/l, face symmetric, tongue midline, neck FROM  Cardiovascular: RRR, normal S1 and S2   Respiratory: lungs CTAB, no wheezing, rhonchi, or crackles   GI: normoactive BS to auscultation, abd soft, NTND   Neuro: no aphasia, speech clear, no dysmetria, no pronator drift  strength 5/5 throughout all 4 extremities  sensation intact to light touch throughout   Extremities: distal pulses 2+ x4   Wound/incision: R groin site dressing C/D/I, no evidence of hematoma       Assessment:   30 y/o right handed female with PMHx of migraines, pre-eclampsia presents for angio embolization of AVM 8/29/22. Now s/p cerebral angiogram for glue NBCA embolization of AVM from R PCA that drains to superior sagittal sinus (8/20/22).       Plan:  NEURO  - neuro checks/vital signs q1hr   - decadron x3days   - cont home depakote 250mg qd  - cont home nortriptyline 10mg qd     CARDIO  - SBP goal   - cardene gtt    PULM  - nonrebreather, SpO2 goal >92%  - CXR post-op   - encourage IS    GI   - ADAT  - bowel regimen   - PPI while on steroids     RENAL/  - Na goal 135-145  - NS held at this time due to pulmonary congestion on CXR, pt tolerating PO diet     ENDO  - ISS while on steroids, f/u A1C    ID  - afebrile, trend leukocytosis    HEME  - SCDs for DVT ppx     DISPO: ICU status, full code, dispo pend     Assessment and Plan d/w Dr. Galindo and Dr. Dorman         Assessment:  Present when checked    []  GCS  E   V  M     Heart Failure: []Acute, [] acute on chronic , []chronic  Heart Failure:  [] Diastolic (HFpEF), [] Systolic (HFrEF), []Combined (HFpEF and HFrEF), [] RHF, [] Pulm HTN, [] Other    [] HILARIO, [] ATN, [] AIN, [] other  [] CKD1, [] CKD2, [] CKD 3, [] CKD 4, [] CKD 5, []ESRD    Encephalopathy: [] Metabolic, [] Hepatic, [] toxic, [] Neurological, [] Other    Abnormal Nurtitional Status: [] malnurtition (see nutrition note), [ ]underweight: BMI < 19, [] morbid obesity: BMI >40, [] Cachexia    [] Sepsis  [] hypovolemic shock,[] cardiogenic shock, [] hemorrhagic shock, [] neuogenic shock  [] Acute Respiratory Failure  []Cerebral edema, [] Brain compression/ herniation,   [] Functional quadriplegia  [] Acute blood loss anemia

## 2022-08-29 NOTE — PROGRESS NOTE ADULT - SUBJECTIVE AND OBJECTIVE BOX
PM EVENTS: no acute overnight events as of documentaiton time of this note.    ROS: c/o bilateral holo visual field disturbance when the patient turns head to right.    T(C): 36.3 (08-29-22 @ 21:28), Max: 36.6 (08-29-22 @ 10:30)  HR: 105 (08-29-22 @ 22:00) (96 - 118)  BP: 117/63 (08-29-22 @ 22:00) (114/56 - 130/61)  RR: 20 (08-29-22 @ 22:00) (8 - 23)  SpO2: 95% (08-29-22 @ 22:00) (89% - 96%)        I&O's Summary    29 Aug 2022 07:01  -  30 Aug 2022 00:02  --------------------------------------------------------  IN: 1685 mL / OUT: 1065 mL / NET: 620 mL        EXAM:     Baldemar Coma Scale:     General: normocephalic, atraumatic, laying in bed, in no distress  Neuro     MS: A/Ox3, cooperative, normal attention, no neglect, comprehension intact, speech with preserved fluency, naming, and repetition    CN: PERRL, (+)L. inferior homonymous hemianopia, EOMI and no ptosis bilaterally, sensation intact to crude touch V1-V3, face symmetric, hearing grossly intact    Mot: bulk normal, tone normal, power 5/5 in bilateral upper and lower proximal extremities    Sens: intact to crude touch in bilateral upper and lower extremities    Reflexes: deferred    Coord: no dysmetria or ataxia on finger to nose/heel to shin, respectively, no focal bradykinetic movements    Gait: deferred  Chest: nonlabored respirations, no adventitious lung sounds bilaterally, heart regular rate/rhythm, present S1/S2, no murmurs or rubs  Abdomen: nondistended, soft and nontender without peritoneal signs, normoactive bowel sounds  Extremities: no clubbing, well-perfused, no edema      ABG - ( 29 Aug 2022 17:39 )  pH, Arterial: 7.39  pH, Blood: x     /  pCO2: 38    /  pO2: 89    / HCO3: 23    / Base Excess: -1.7  /  SaO2: 98.1                                    12.7   11.44 )-----------( 492      ( 29 Aug 2022 07:48 )             38.8     08-29    140  |  102  |  16  ----------------------------<  96  3.9   |  27  |  0.69    Ca    9.6      29 Aug 2022 07:45        MEDICATIONS  (STANDING):  acetaminophen     Tablet .. 1000 milliGRAM(s) Oral every 6 hours  albuterol/ipratropium for Nebulization 3 milliLiter(s) Nebulizer every 6 hours  chlorhexidine 2% Cloths 1 Application(s) Topical <User Schedule>  chlorhexidine 4% Liquid 1 Application(s) Topical once  dexAMETHasone     Tablet 4 milliGRAM(s) Oral every 6 hours  insulin lispro (ADMELOG) corrective regimen sliding scale   SubCutaneous Before meals and at bedtime  niCARdipine Infusion 5 mG/Hr (25 mL/Hr) IV Continuous <Continuous>  nortriptyline 10 milliGRAM(s) Oral at bedtime  pantoprazole    Tablet 40 milliGRAM(s) Oral before breakfast    MEDICATIONS  (PRN):  artificial  tears Solution 1 Drop(s) Both EYES every 4 hours PRN Dry Eyes  metoclopramide Injectable 10 milliGRAM(s) IV Push every 4 hours PRN Nausea and vomittng  traMADol 25 milliGRAM(s) Oral every 4 hours PRN Moderate Pain (4 - 6)  traMADol 50 milliGRAM(s) Oral every 4 hours PRN Severe Pain (7 - 10)      Please see the day's note documented by Dr. Dorman for detailed ongoing assessment and plan.     PM EVENTS: no acute overnight events as of documentaiton time of this note.    ROS: c/o bilateral holo visual field disturbance when the patient turns head to right.    T(C): 36.3 (08-29-22 @ 21:28), Max: 36.6 (08-29-22 @ 10:30)  HR: 105 (08-29-22 @ 22:00) (96 - 118)  BP: 117/63 (08-29-22 @ 22:00) (114/56 - 130/61)  RR: 20 (08-29-22 @ 22:00) (8 - 23)  SpO2: 95% (08-29-22 @ 22:00) (89% - 96%)        I&O's Summary    29 Aug 2022 07:01  -  30 Aug 2022 00:02  --------------------------------------------------------  IN: 1685 mL / OUT: 1065 mL / NET: 620 mL        EXAM:     Baldemar Coma Scale:     General: normocephalic, atraumatic, laying in bed, in no distress  Neuro     MS: A/Ox3, cooperative, normal attention, no neglect, comprehension intact, speech with preserved fluency, naming, and repetition    CN: PERRL, (+)L. inferior homonymous quadrantanopia, EOMI and no ptosis bilaterally, sensation intact to crude touch V1-V3, face symmetric, hearing grossly intact    Mot: bulk normal, tone normal, power 5/5 in bilateral upper and lower proximal extremities    Sens: intact to crude touch in bilateral upper and lower extremities    Reflexes: deferred    Coord: no dysmetria or ataxia on finger to nose/heel to shin, respectively, no focal bradykinetic movements    Gait: deferred  Chest: nonlabored respirations, no adventitious lung sounds bilaterally, heart regular rate/rhythm, present S1/S2, no murmurs or rubs  Abdomen: nondistended, soft and nontender without peritoneal signs, normoactive bowel sounds  Extremities: no clubbing, well-perfused, no edema      ABG - ( 29 Aug 2022 17:39 )  pH, Arterial: 7.39  pH, Blood: x     /  pCO2: 38    /  pO2: 89    / HCO3: 23    / Base Excess: -1.7  /  SaO2: 98.1                                    12.7   11.44 )-----------( 492      ( 29 Aug 2022 07:48 )             38.8     08-29    140  |  102  |  16  ----------------------------<  96  3.9   |  27  |  0.69    Ca    9.6      29 Aug 2022 07:45        MEDICATIONS  (STANDING):  acetaminophen     Tablet .. 1000 milliGRAM(s) Oral every 6 hours  albuterol/ipratropium for Nebulization 3 milliLiter(s) Nebulizer every 6 hours  chlorhexidine 2% Cloths 1 Application(s) Topical <User Schedule>  chlorhexidine 4% Liquid 1 Application(s) Topical once  dexAMETHasone     Tablet 4 milliGRAM(s) Oral every 6 hours  insulin lispro (ADMELOG) corrective regimen sliding scale   SubCutaneous Before meals and at bedtime  niCARdipine Infusion 5 mG/Hr (25 mL/Hr) IV Continuous <Continuous>  nortriptyline 10 milliGRAM(s) Oral at bedtime  pantoprazole    Tablet 40 milliGRAM(s) Oral before breakfast    MEDICATIONS  (PRN):  artificial  tears Solution 1 Drop(s) Both EYES every 4 hours PRN Dry Eyes  metoclopramide Injectable 10 milliGRAM(s) IV Push every 4 hours PRN Nausea and vomittng  traMADol 25 milliGRAM(s) Oral every 4 hours PRN Moderate Pain (4 - 6)  traMADol 50 milliGRAM(s) Oral every 4 hours PRN Severe Pain (7 - 10)      Please see the day's note documented by Dr. Dorman for detailed ongoing assessment and plan.

## 2022-08-29 NOTE — H&P ADULT - NSHPPHYSICALEXAM_GEN_ALL_CORE
Neuro: A&Ox3, speech clear. Answers questions appropriately  CN: CN II-XII intact.   Facial movements symmetrical, no facial droop, tongue protrusion midline.   No dysarthria.  Motor strength: Full and equal 5/5 strength B/L upper and lower extremities  Sensory:  Sensation intact.  Cerebellar: normal finger to nose, normal heel to shin     CHEST/LUNG: Clear to auscultation bilaterally; No rales, rhonchi, wheezing, or rubs  HEART: Regular rate and rhythm; No murmurs, rubs, or gallops  ABDOMEN: Soft, Nontender, Nondistended; Bowel sounds present  EXTREMITIES:  2+ DP pulses, No clubbing, cyanosis, or edema  SKIN: No rashes or lesions Neuro: A&Ox3, speech clear. Answers questions appropriately  CN: CN II-XII intact. No visual field cut.   Facial movements symmetrical, no facial droop, tongue protrusion midline.   No dysarthria.  Motor strength: Full and equal 5/5 strength B/L upper and lower extremities  Sensory:  Sensation intact.  Cerebellar: normal finger to nose, normal heel to shin     CHEST/LUNG: Clear to auscultation bilaterally; No rales, rhonchi, wheezing, or rubs  HEART: Regular rate and rhythm; No murmurs, rubs, or gallops  ABDOMEN: Soft, Nontender, Nondistended; Bowel sounds present  EXTREMITIES:  2+ DP pulses, No clubbing, cyanosis, or edema  SKIN: No rashes or lesions

## 2022-08-29 NOTE — H&P ADULT - PROBLEM SELECTOR PLAN 1
- Consent for cerebral angiogram with endovascular embolization of dural AVF obtained and placed in chart. Risks and benefits of procedure explained, pt understands  - NPO/IVF  - SBP<110  - c/w depakote and nortriptyline  - Admit to NSICU    Discussed with Dr. Gonzalez/Mateo

## 2022-08-29 NOTE — H&P ADULT - ASSESSMENT
30 y/o right handed female, never smoker, pmhx migraines, pre-eclampsia presenting for cerebral angiogram with endovascular embolization of right PCA fistula.

## 2022-08-29 NOTE — BRIEF OPERATIVE NOTE - OPERATION/FINDINGS
Patient was brought to the angio suite, placed on x-ray table, placed on standard monitor by anesthesiologist. B/l groins were prepped and draped in usual sterile fashion.   6 Urdu shore sheath was used in the right common femoral artery for access. A diagnostic angiogram was performed under general anesthesia care. Right vertebral artery was injected and studied.     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  The right PCA was selectively microcatheterized and injected glue with obliteration of the AVF.     Full report to follow  Patient tolerated the procedure well and at baseline neurological condition.   Right groin vascular access site was closed with exoseal and applied manual compression.   There is no hematoma.   Patient was transferred to NSICU.     Above discussed with Dr. Gonzalez. Patient was brought to the angio suite, placed on x-ray table, placed on standard monitor by anesthesiologist. B/l groins were prepped and draped in usual sterile fashion.   6 [place]uba shore sheath was used in the right common femoral artery for access. A diagnostic angiogram was performed under general anesthesia care. Right vertebral artery was injected and studied.     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  The right PCA was selectively microcatheterized and injected glue NBCA with obliteration of the AVF.     Full report to follow  Patient tolerated the procedure well and at baseline neurological condition.   Right groin vascular access site was closed with exoseal and applied manual compression.   There is no hematoma.   Patient was transferred to NSICU.     Above discussed with Dr. Gonzalez. Patient was brought to the angio suite, placed on x-ray table, placed on standard monitor by anesthesiologist. B/l groins were prepped and draped in usual sterile fashion.   6 [place] shore sheath was used in the right common femoral artery for access. A diagnostic angiogram was performed under general anesthesia care. Right vertebral artery was injected and studied.     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  The right PCA was selectively microcatheterized and injected glue NBCA with obliteration of the AVM.    Full report to follow  Patient tolerated the procedure well and at baseline neurological condition.   Right groin vascular access site was closed with exoseal and applied manual compression.   There is no hematoma.   Patient was transferred to NSICU.     Above discussed with Dr. Gonzalez. Patient was brought to the angio suite, placed on x-ray table, placed on standard monitor by anesthesiologist. B/l groins were prepped and draped in usual sterile fashion.   6 [place] shore sheath was used in the right common femoral artery for access. A diagnostic angiogram was performed under general anesthesia care. Right vertebral artery was injected and studied.     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  The right PCA was selectively microcatheterized and injected glue NBCA with obliteration of the AVM.    Full report to follow  Patient tolerated the procedure well and at baseline neurological condition.   Right groin vascular access site was closed with exoseal and applied manual compression.   There is no hematoma.   Patient was transferred to NSICU.     Above discussed with Dr. Gonzalez.

## 2022-08-29 NOTE — CHART NOTE - NSCHARTNOTEFT_GEN_A_CORE
Patient seen and evaluated at bedside in PACU, nurse called for patient's O2 sat high 80s/low 90s with 6L NC, asymptomatic. Patient states she does not feel SOB, lung fields clear to auscultation. Patient able to use incentive spirometer, O2 sat increases to 95 with inhalation. CXR, duoneb, abg ordered, encourage incentive spirometer post-op atelectasis.

## 2022-08-29 NOTE — PROGRESS NOTE ADULT - SUBJECTIVE AND OBJECTIVE BOX
=================================  NEUROCRITICAL CARE ATTENDING NOTE  =================================    ISREAL DOWELL   MRN-[id number]  Summary:  31y/F right handed female, never smoker, pmhx migraines, pre-eclampsia presenting for cerebral angiogram with endovascular embolization of right PCA fistula. Pt presented with a sudden, unusual, severe HA and found to have R occipital intraparenchymal hemorrhage (4/2022). She had a diagnostic angiogram on 4/28/22 with Dr. Castro which showed mild small early shunting of the R distal PCA into the venous system. This finding was in the same area of IPH and thought to be either primary abnormality or secondary to IPH.   Repeat cerebral angiogram on 6/16/2022 which showed small early shunting of the right parieto-occipital artery which seemed worse in comparison to the previous cerebral DSA. Pt presents today for treatment of right PCA fistula. Patient reports that left lower visual field cut has improved. Has had neuro-opthalmology exam which was normal. Has intermittent headaches with aura which she takes depakote and nortriptyline.  (29 Aug 2022 06:57)    COURSE IN THE HOSPITAL:  08/29 Admitted to [facility], s/p angio    Past Medical History: Bicornuate uterus Migraine headache TIA (transient ischemic attack) PCOS (polycystic ovarian syndrome) Obesity Cerebrovascular dural AV fistula  Allergies:  No Known Allergies  Home meds:   ·	Depakote 250 mg oral delayed release tablet: 1 tab(s) orally once a day   ·	nortriptyline 10 mg oral capsule: 1 cap(s) orally once a day    PHYSICAL EXAMINATION  T(C): 36.6 (08-29 @ 12:24), Max: 36.6 (08-29 @ 10:30) HR: 106 (08-29 @ 15:00) (96 - 117) BP: 122/55 (08-29 @ 12:24) (114/56 - 130/61) RR: 22 (08-29 @ 15:00) (10 - 23) SpO2: 96% (08-29 @ 15:00) (89% - 96%)  NEUROLOGIC EXAMINATION:  Patient is awake, alert, fully oriented, pupils 2-3mm equal and briskly reactive to light, EOMs intact, moves all 4s, no visual field cuts  GENERAL: not intubated, not in cardiorespiratory distress  EENT:  anicteric  CARDIOVASCULAR: (+) S1 S2, normal rate and regular rhythm  PULMONARY: clear to auscultation bilaterally  ABDOMEN: soft, nontender with normoactive bowel sounds  EXTREMITIES: no edema  SKIN: no rash    LABS:  CAPILLARY BLOOD GLUCOSE 163               12.7   11.44 )-----------( 492      ( 29 Aug 2022 07:48 )             38.8     140  |  102  |  16  ----------------------------<  96  3.9   |  27  |  0.69    Ca    9.6      29 Aug 2022 07:45    08-29 @ 07:01  -  08-29 @ 15:57  IN: 1385 mL / OUT: 540 mL / NET: 845 mL    Bacteriology:  CSF studies:  EEG:  Neuroimaging:  Other imaging:    MEDICATIONS:  ·	nortriptyline 10 milliGRAM(s) Oral at bedtime  ·	albuterol/ipratropium for Nebulization 3 milliLiter(s) Nebulizer every 6 hours PRN  ·	pantoprazole    Tablet 40 milliGRAM(s) Oral before breakfast  ·	dexAMETHasone     Tablet 4 milliGRAM(s) Oral every 6 hours  ·	insulin lispro (ADMELOG) corrective regimen sliding scale   SubCutaneous Before meals and at bedtime    IV FLUIDS:  ·	niCARdipine Infusion 5 mG/Hr IV Continuous <Continuous>  DRIPS:  DIET: NPO  Lines:  Drains:    Wounds:    CODE STATUS:  Full Code                       GOALS OF CARE:  aggressive                      DISPOSITION:  ICU

## 2022-08-29 NOTE — H&P ADULT - NSHPROSALLOTHERNEGRD_GEN_ALL_CORE
The patient is a 37y Male complaining of altered mental status.
All other review of systems negative, except as noted in HPI

## 2022-08-30 PROBLEM — I67.1 CEREBRAL ANEURYSM, NONRUPTURED: Chronic | Status: ACTIVE | Noted: 2022-08-29

## 2022-08-30 LAB
A1C WITH ESTIMATED AVERAGE GLUCOSE RESULT: 5.6 % — SIGNIFICANT CHANGE UP (ref 4–5.6)
ANION GAP SERPL CALC-SCNC: 10 MMOL/L — SIGNIFICANT CHANGE UP (ref 5–17)
BUN SERPL-MCNC: 14 MG/DL — SIGNIFICANT CHANGE UP (ref 7–23)
CALCIUM SERPL-MCNC: 8.8 MG/DL — SIGNIFICANT CHANGE UP (ref 8.4–10.5)
CHLORIDE SERPL-SCNC: 100 MMOL/L — SIGNIFICANT CHANGE UP (ref 96–108)
CO2 SERPL-SCNC: 25 MMOL/L — SIGNIFICANT CHANGE UP (ref 22–31)
CREAT SERPL-MCNC: 0.7 MG/DL — SIGNIFICANT CHANGE UP (ref 0.5–1.3)
EGFR: 117 ML/MIN/1.73M2 — SIGNIFICANT CHANGE UP
ESTIMATED AVERAGE GLUCOSE: 114 MG/DL — SIGNIFICANT CHANGE UP (ref 68–114)
GLUCOSE BLDC GLUCOMTR-MCNC: 105 MG/DL — HIGH (ref 70–99)
GLUCOSE BLDC GLUCOMTR-MCNC: 170 MG/DL — HIGH (ref 70–99)
GLUCOSE BLDC GLUCOMTR-MCNC: 174 MG/DL — HIGH (ref 70–99)
GLUCOSE BLDC GLUCOMTR-MCNC: 174 MG/DL — HIGH (ref 70–99)
GLUCOSE SERPL-MCNC: 171 MG/DL — HIGH (ref 70–99)
HCT VFR BLD CALC: 37.1 % — SIGNIFICANT CHANGE UP (ref 34.5–45)
HGB BLD-MCNC: 12.4 G/DL — SIGNIFICANT CHANGE UP (ref 11.5–15.5)
MAGNESIUM SERPL-MCNC: 1.8 MG/DL — SIGNIFICANT CHANGE UP (ref 1.6–2.6)
MCHC RBC-ENTMCNC: 25.6 PG — LOW (ref 27–34)
MCHC RBC-ENTMCNC: 33.4 GM/DL — SIGNIFICANT CHANGE UP (ref 32–36)
MCV RBC AUTO: 76.5 FL — LOW (ref 80–100)
NRBC # BLD: 0 /100 WBCS — SIGNIFICANT CHANGE UP (ref 0–0)
PHOSPHATE SERPL-MCNC: 2.9 MG/DL — SIGNIFICANT CHANGE UP (ref 2.5–4.5)
PLATELET # BLD AUTO: 468 K/UL — HIGH (ref 150–400)
POTASSIUM SERPL-MCNC: 4.1 MMOL/L — SIGNIFICANT CHANGE UP (ref 3.5–5.3)
POTASSIUM SERPL-SCNC: 4.1 MMOL/L — SIGNIFICANT CHANGE UP (ref 3.5–5.3)
RBC # BLD: 4.85 M/UL — SIGNIFICANT CHANGE UP (ref 3.8–5.2)
RBC # FLD: 14.2 % — SIGNIFICANT CHANGE UP (ref 10.3–14.5)
SODIUM SERPL-SCNC: 135 MMOL/L — SIGNIFICANT CHANGE UP (ref 135–145)
WBC # BLD: 19.31 K/UL — HIGH (ref 3.8–10.5)
WBC # FLD AUTO: 19.31 K/UL — HIGH (ref 3.8–10.5)

## 2022-08-30 PROCEDURE — 99291 CRITICAL CARE FIRST HOUR: CPT

## 2022-08-30 PROCEDURE — 99292 CRITICAL CARE ADDL 30 MIN: CPT

## 2022-08-30 RX ORDER — TRAMADOL HYDROCHLORIDE 50 MG/1
75 TABLET ORAL EVERY 4 HOURS
Refills: 0 | Status: DISCONTINUED | OUTPATIENT
Start: 2022-08-30 | End: 2022-08-31

## 2022-08-30 RX ORDER — TRAMADOL HYDROCHLORIDE 50 MG/1
50 TABLET ORAL EVERY 4 HOURS
Refills: 0 | Status: DISCONTINUED | OUTPATIENT
Start: 2022-08-30 | End: 2022-08-31

## 2022-08-30 RX ORDER — NICARDIPINE HYDROCHLORIDE 30 MG/1
5 CAPSULE, EXTENDED RELEASE ORAL
Qty: 40 | Refills: 0 | Status: DISCONTINUED | OUTPATIENT
Start: 2022-08-30 | End: 2022-08-31

## 2022-08-30 RX ORDER — TRAMADOL HYDROCHLORIDE 50 MG/1
25 TABLET ORAL ONCE
Refills: 0 | Status: DISCONTINUED | OUTPATIENT
Start: 2022-08-30 | End: 2022-08-30

## 2022-08-30 RX ORDER — ACETAMINOPHEN 500 MG
650 TABLET ORAL EVERY 6 HOURS
Refills: 0 | Status: DISCONTINUED | OUTPATIENT
Start: 2022-08-30 | End: 2022-08-31

## 2022-08-30 RX ORDER — MAGNESIUM SULFATE 500 MG/ML
2 VIAL (ML) INJECTION ONCE
Refills: 0 | Status: COMPLETED | OUTPATIENT
Start: 2022-08-30 | End: 2022-08-30

## 2022-08-30 RX ADMIN — NORTRIPTYLINE HYDROCHLORIDE 10 MILLIGRAM(S): 10 CAPSULE ORAL at 21:53

## 2022-08-30 RX ADMIN — TRAMADOL HYDROCHLORIDE 50 MILLIGRAM(S): 50 TABLET ORAL at 20:35

## 2022-08-30 RX ADMIN — Medication 25 GRAM(S): at 08:41

## 2022-08-30 RX ADMIN — Medication 2 MILLIGRAM(S): at 11:48

## 2022-08-30 RX ADMIN — TRAMADOL HYDROCHLORIDE 25 MILLIGRAM(S): 50 TABLET ORAL at 23:24

## 2022-08-30 RX ADMIN — NICARDIPINE HYDROCHLORIDE 25 MG/HR: 30 CAPSULE, EXTENDED RELEASE ORAL at 03:17

## 2022-08-30 RX ADMIN — Medication 4 MILLIGRAM(S): at 01:49

## 2022-08-30 RX ADMIN — Medication 1000 MILLIGRAM(S): at 05:55

## 2022-08-30 RX ADMIN — TRAMADOL HYDROCHLORIDE 25 MILLIGRAM(S): 50 TABLET ORAL at 22:23

## 2022-08-30 RX ADMIN — CHLORHEXIDINE GLUCONATE 1 APPLICATION(S): 213 SOLUTION TOPICAL at 05:47

## 2022-08-30 RX ADMIN — Medication 3 MILLILITER(S): at 05:46

## 2022-08-30 RX ADMIN — NICARDIPINE HYDROCHLORIDE 25 MG/HR: 30 CAPSULE, EXTENDED RELEASE ORAL at 07:21

## 2022-08-30 RX ADMIN — Medication 2: at 11:08

## 2022-08-30 RX ADMIN — Medication 1 DROP(S): at 14:25

## 2022-08-30 RX ADMIN — Medication 1000 MILLIGRAM(S): at 01:10

## 2022-08-30 RX ADMIN — Medication 650 MILLIGRAM(S): at 17:52

## 2022-08-30 RX ADMIN — Medication 650 MILLIGRAM(S): at 17:58

## 2022-08-30 RX ADMIN — Medication 1000 MILLIGRAM(S): at 00:10

## 2022-08-30 RX ADMIN — Medication 2: at 22:23

## 2022-08-30 RX ADMIN — NICARDIPINE HYDROCHLORIDE 25 MG/HR: 30 CAPSULE, EXTENDED RELEASE ORAL at 00:13

## 2022-08-30 RX ADMIN — TRAMADOL HYDROCHLORIDE 50 MILLIGRAM(S): 50 TABLET ORAL at 21:37

## 2022-08-30 RX ADMIN — Medication 1000 MILLIGRAM(S): at 05:46

## 2022-08-30 RX ADMIN — Medication 2: at 16:39

## 2022-08-30 RX ADMIN — PANTOPRAZOLE SODIUM 40 MILLIGRAM(S): 20 TABLET, DELAYED RELEASE ORAL at 07:02

## 2022-08-30 RX ADMIN — Medication 4 MILLIGRAM(S): at 07:21

## 2022-08-30 RX ADMIN — DIVALPROEX SODIUM 250 MILLIGRAM(S): 500 TABLET, DELAYED RELEASE ORAL at 09:32

## 2022-08-30 RX ADMIN — Medication 1000 MILLIGRAM(S): at 11:41

## 2022-08-30 NOTE — PROGRESS NOTE ADULT - SUBJECTIVE AND OBJECTIVE BOX
S/Overnight events:  [facility] Course:   8/29: Now POD#0 s/p cerebral angiogram for glue NBCA embolization of AVM from R 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         Vital Signs Last 24 Hrs  T(C): 36.3 (29 Aug 2022 21:28), Max: 36.6 (29 Aug 2022 10:30)  T(F): 97.4 (29 Aug 2022 21:28), Max: 97.8 (29 Aug 2022 10:30)  HR: 113 (30 Aug 2022 04:00) (96 - 118)  BP: 110/58 (30 Aug 2022 04:00) (107/54 - 130/61)  BP(mean): 79 (30 Aug 2022 04:00) (70 - 105)  RR: 20 (30 Aug 2022 04:00) (8 - 25)  SpO2: 94% (30 Aug 2022 04:00) (89% - 96%)    Parameters below as of 30 Aug 2022 04:00  Patient On (Oxygen Delivery Method): mask, nonrebreather  O2 Flow (L/min): 8      I&O's Detail    29 Aug 2022 07:01  -  30 Aug 2022 04:41  --------------------------------------------------------  IN:    IV PiggyBack: 100 mL    NiCARdipine: 770 mL    Oral Fluid: 365 mL    sodium chloride 0.9%: 700 mL  Total IN: 1935 mL    OUT:    Indwelling Catheter - Urethral (mL): 1495 mL  Total OUT: 1495 mL    Total NET: 440 mL        I&O's Summary    29 Aug 2022 07:01  -  30 Aug 2022 04:41  --------------------------------------------------------  IN: 1935 mL / OUT: 1495 mL / NET: 440 mL      Exam:  General: NAD, pt is comfortably sitting up in bed, A&O x3, on 6L NC   HEENT: CN II-XII grossly intact, PERRL 3mm, EOMI b/l, face symmetric, tongue midline, neck FROM  Cardiovascular: RRR, normal S1 and S2   Respiratory: lungs CTAB, no wheezing, rhonchi, or crackles   GI: normoactive BS to auscultation, abd soft, NTND   Neuro: no aphasia, speech clear, no dysmetria, no pronator drift  strength 5/5 throughout all 4 extremities  sensation intact to light touch throughout   Extremities: distal pulses 2+ x4   Wound/incision: R groin site dressing C/D/I, no evidence of hematoma     LABS:                        12.7   11.44 )-----------( 492      ( 29 Aug 2022 07:48 )             38.8     08-29    140  |  102  |  16  ----------------------------<  96  3.9   |  27  |  0.69    Ca    9.6      29 Aug 2022 07:45      PT/INR - ( 29 Aug 2022 07:45 )   PT: 11.9 sec;   INR: 1.00          PTT - ( 29 Aug 2022 07:45 )  PTT:32.5 sec        CAPILLARY BLOOD GLUCOSE      POCT Blood Glucose.: 144 mg/dL (29 Aug 2022 21:18)  POCT Blood Glucose.: 163 mg/dL (29 Aug 2022 15:03)      Drug Levels: [] N/A    CSF Analysis: [] N/A      Allergies    No Known Allergies    Intolerances      MEDICATIONS:  Antibiotics:    Neuro:  acetaminophen     Tablet .. 1000 milliGRAM(s) Oral every 6 hours  diVALproex  milliGRAM(s) Oral daily  metoclopramide Injectable 10 milliGRAM(s) IV Push every 4 hours PRN  nortriptyline 10 milliGRAM(s) Oral at bedtime  traMADol 25 milliGRAM(s) Oral every 4 hours PRN  traMADol 50 milliGRAM(s) Oral every 4 hours PRN    Anticoagulation:    OTHER:  albuterol/ipratropium for Nebulization 3 milliLiter(s) Nebulizer every 6 hours  artificial  tears Solution 1 Drop(s) Both EYES every 4 hours PRN  chlorhexidine 2% Cloths 1 Application(s) Topical <User Schedule>  chlorhexidine 4% Liquid 1 Application(s) Topical once  dexAMETHasone     Tablet 4 milliGRAM(s) Oral every 6 hours  dexAMETHasone     Tablet 2 milliGRAM(s) Oral every 6 hours  insulin lispro (ADMELOG) corrective regimen sliding scale   SubCutaneous Before meals and at bedtime  niCARdipine Infusion 5 mG/Hr IV Continuous <Continuous>  pantoprazole    Tablet 40 milliGRAM(s) Oral before breakfast  ASSESSMENT:  30 y/o right handed female with PMHx of migraines, pre-eclampsia presents for angio embolization of AVM 8/29/22. Now s/p cerebral angiogram for glue NBCA embolization of AVM from R PCA that drains to superior sagittal sinus (8/29/22).     PLAN:  NEURO  - neuro checks/vital signs q1hr   - decadron x3days   - cont home depakote 250mg qd  - cont home nortriptyline 10mg qd     CARDIO  - SBP goal   - cardene gtt    PULM  - nonrebreather, SpO2 goal >92%  - CXR post-op   - encourage IS    GI   - ADAT  - bowel regimen   - PPI while on steroids     RENAL/  - Na goal 135-145  - NS held at this time due to pulmonary congestion on CXR, pt tolerating PO diet     ENDO  - ISS while on steroids, f/u A1C    ID  - afebrile, trend leukocytosis    HEME  - SCDs for DVT ppx     DISPO: ICU status, full code, dispo pend     Assessment and Plan d/w Dr. Galindo and Dr. Dorman

## 2022-08-30 NOTE — PROGRESS NOTE ADULT - ASSESSMENT
31y/F with  cerebral AVM, brain compression  migraine  h/o ICH  h/o L inferior homonymous quadrantanopsia  h/o TIA  PCOS    PLAN:   NEURO: neurochecks q1h, PRN pain meds with acetaminophen, opiates  SBP control, steroids as per INR  REHAB:  physical therapy evaluation and management    EARLY MOB:  bedrest    PULM:  NRB, incentive spirometry  CARDIO:  SBP goal  mm Hg  ENDO:  Blood sugar goals 140-180 mg/dL, continue insulin sliding scale  GI:  PPI for GI prophylaxis  DIET: advance as tolerated  RENAL:  d/c IVF once eating well  HEM/ONC: check post-procedure Hb  VTE Prophylaxis: SCDs, no DVT chemoprophylaxis for now as patient is high risk for bleed  ID: afebrile, leukocytosis  Social: will update family    Active issues:  What's keeping patient in the ICU?  What is this patient's dispo plan?    ATTENDING ATTESTATION:  I was physically present for the key portions of the evaluation and management (E/M) service provided.  I agree with the above history, physical and plan, which I have reviewed and edited where appropriate.    Patient at high risk for neurological deterioration or death due to:  ICU delirium, aspiration PNA, DVT / PE.  Critical care time:  I have personally provided 45 minutes of critical care time, excluding time spent on separate procedures.      Plan discussed with RN, house staff. 31y/F with  cerebral AVM, brain compression  migraine  h/o ICH  h/o L inferior homonymous quadrantanopsia  h/o TIA  PCOS    PLAN:   NEURO: neurochecks q1h, VSq1 PRN pain meds with acetaminophen, opiates  SBP control, steroids as per INR  migraines divalproic  REHAB:  physical therapy evaluation and management    EARLY MOB:  OOB to chair    PULM:  PRN O2 support to keep sats >/=90%, incentive spirometry  CARDIO:  SBP goal 100-120 mm Hg, taper cardene to off  ENDO:  Blood sugar goals 140-180 mg/dL, continue insulin sliding scale  GI:  PPI for GI prophylaxis  DIET: advance as tolerated  RENAL:  IVL   HEM/ONC: Hb stable  VTE Prophylaxis: SCDs, no DVT chemoprophylaxis for now as patient is high risk for bleed  ID: afebrile, leukocytosis  Social: will update family    Active issues:  What's keeping patient in the ICU? beba martines  What is this patient's dispo plan?    ATTENDING ATTESTATION:  I was physically present for the key portions of the evaluation and management (E/M) service provided.  I agree with the above history, physical and plan, which I have reviewed and edited where appropriate.    Patient at high risk for neurological deterioration or death due to:  ICU delirium, aspiration PNA, DVT / PE.  Critical care time:  I have personally provided 45 minutes of critical care time, excluding time spent on separate procedures.      Plan discussed with RN, house staff.

## 2022-08-30 NOTE — PROGRESS NOTE ADULT - SUBJECTIVE AND OBJECTIVE BOX
=================================  NEUROCRITICAL CARE ATTENDING NOTE  =================================    ISREAL DOWELL   MRN-[id number]  Summary:  31y/F right handed female, never smoker, pmhx migraines, pre-eclampsia presenting for cerebral angiogram with endovascular embolization of right PCA fistula. Pt presented with a sudden, unusual, severe HA and found to have R occipital intraparenchymal hemorrhage (4/2022). She had a diagnostic angiogram on 4/28/22 with Dr. Castro which showed mild small early shunting of the R distal PCA into the venous system. This finding was in the same area of IPH and thought to be either primary abnormality or secondary to IPH.   Repeat cerebral angiogram on 6/16/2022 which showed small early shunting of the right parieto-occipital artery which seemed worse in comparison to the previous cerebral DSA. Pt presents today for treatment of right PCA fistula. Patient reports that left lower visual field cut has improved. Has had neuro-opthalmology exam which was normal. Has intermittent headaches with aura which she takes depakote and nortriptyline.  (29 Aug 2022 06:57)    COURSE IN THE HOSPITAL:  08/29 Admitted to [facility], s/p angio embo  08/30 POD#1    Past Medical History: Bicornuate uterus Migraine headache TIA (transient ischemic attack) PCOS (polycystic ovarian syndrome) Obesity Cerebrovascular dural AV fistula  Allergies:  No Known Allergies  Home meds:   ·	Depakote 250 mg oral delayed release tablet: 1 tab(s) orally once a day   ·	nortriptyline 10 mg oral capsule: 1 cap(s) orally once a day    PHYSICAL EXAMINATION  T(C): 36.3 (08-29 @ 21:28), Max: 36.6 (08-29 @ 10:30) HR: 113 (08-30 @ 07:00) (96 - 118) BP: 113/56 (08-30 @ 07:00) (107/54 - 130/61) RR: 20 (08-30 @ 07:00) (8 - 25) SpO2: 94% (08-30 @ 07:00) (89% - 96%)  NEUROLOGIC EXAMINATION:  Patient is awake, alert, fully oriented, pupils 2-3mm equal and briskly reactive to light, EOMs intact, moves all 4s, no visual field cuts  GENERAL: not intubated, not in cardiorespiratory distress  EENT:  anicteric  CARDIOVASCULAR: (+) S1 S2, tachycardic rate and regular rhythm  PULMONARY: clear to auscultation bilaterally  ABDOMEN: soft, nontender with normoactive bowel sounds  EXTREMITIES: no edema  SKIN: no rash    LABS: 08-30  CAPILLARY BLOOD GLUCOSE 105 144 163     (11.44)  12.4 (12.7)  19.31 )-----------( 468      ( 30 Aug 2022 05:46 )             37.1   (140)  135  |  100  |  14  ----------------------------<  171<H>  4.1   |  25  |  0.70    Ca    8.8      30 Aug 2022 05:46  Phos  2.9     08-30  Mg     1.8     08-30 08-29 @ 07:01  -  08-30 @ 07:00  IN: 2085 mL / OUT: 1570 mL / NET: 515 mL     Bacteriology:  CSF studies:  EEG:  Neuroimaging:  Other imaging:    MEDICATIONS: 08-30    ·	acetaminophen     Tablet .. 1000 Oral every 6 hours  ·	diVALproex  Oral daily  ·	nortriptyline 10 Oral at bedtime  ·	albuterol/ipratropium for Nebulization 3 Nebulizer every 6 hours  ·	pantoprazole    Tablet 40 Oral before breakfast  ·	dexAMETHasone     Tablet 4 Oral every 6 hours  ·	insulin lispro (ADMELOG) corrective regimen sliding scale  SubCutaneous Before meals and at bedtime  ·	artificial  tears Solution 1 Both EYES every 4 hours PRN  ·	metoclopramide Injectable 10 IV Push every 4 hours PRN  ·	traMADol 25 Oral every 4 hours PRN  ·	traMADol 50 Oral every 4 hours PRN    IV FLUIDS:  ·	niCARdipine Infusion 5 mG/Hr IV Continuous <Continuous>  DRIPS:  DIET: NPO  Lines:  Drains:    Wounds:    CODE STATUS:  Full Code                       GOALS OF CARE:  aggressive                      DISPOSITION:  ICU =================================  NEUROCRITICAL CARE ATTENDING NOTE  =================================    ISREAL DOWELL   MRN-[id number]  Summary:  31y/F right handed female, never smoker, pmhx migraines, pre-eclampsia presenting for cerebral angiogram with endovascular embolization of right PCA fistula. Pt presented with a sudden, unusual, severe HA and found to have R occipital intraparenchymal hemorrhage (4/2022). She had a diagnostic angiogram on 4/28/22 with Dr. Castro which showed mild small early shunting of the R distal PCA into the venous system. This finding was in the same area of IPH and thought to be either primary abnormality or secondary to IPH.   Repeat cerebral angiogram on 6/16/2022 which showed small early shunting of the right parieto-occipital artery which seemed worse in comparison to the previous cerebral DSA. Pt presents today for treatment of right PCA fistula. Patient reports that left lower visual field cut has improved. Has had neuro-opthalmology exam which was normal. Has intermittent headaches with aura which she takes depakote and nortriptyline.  (29 Aug 2022 06:57)    COURSE IN THE HOSPITAL:  08/29 Admitted to [facility], s/p angio embo  08/30 POD#1 on NRB overnight, desats to high 80s off NRB, c/o     Past Medical History: Bicornuate uterus Migraine headache TIA (transient ischemic attack) PCOS (polycystic ovarian syndrome) Obesity Cerebrovascular dural AV fistula  Allergies:  No Known Allergies  Home meds:   ·	Depakote 250 mg oral delayed release tablet: 1 tab(s) orally once a day   ·	nortriptyline 10 mg oral capsule: 1 cap(s) orally once a day    PHYSICAL EXAMINATION  T(C): 36.3 (08-29 @ 21:28), Max: 36.6 (08-29 @ 10:30) HR: 113 (08-30 @ 07:00) (96 - 118) BP: 113/56 (08-30 @ 07:00) (107/54 - 130/61) RR: 20 (08-30 @ 07:00) (8 - 25) SpO2: 94% (08-30 @ 07:00) (89% - 96%)  NEUROLOGIC EXAMINATION:  Patient is awake, alert, fully oriented, pupils 2-3mm equal and briskly reactive to light, EOMs intact, moves all 4s, no visual field cuts  GENERAL: not intubated, not in cardiorespiratory distress  EENT:  anicteric  CARDIOVASCULAR: (+) S1 S2, tachycardic rate and regular rhythm  PULMONARY: clear to auscultation bilaterally  ABDOMEN: soft, nontender with normoactive bowel sounds  EXTREMITIES: no edema  SKIN: no rash    LABS: 08-30  CAPILLARY BLOOD GLUCOSE 105 144 163     (11.44)  12.4 (12.7)  19.31 )-----------( 468      ( 30 Aug 2022 05:46 )             37.1   (140)  135  |  100  |  14  ----------------------------<  171<H>  4.1   |  25  |  0.70    Ca    8.8      30 Aug 2022 05:46  Phos  2.9     08-30  Mg     1.8     08-30 08-29 @ 07:01  -  08-30 @ 07:00  IN: 2085 mL / OUT: 1570 mL / NET: 515 mL     Bacteriology:  CSF studies:  EEG:  Neuroimaging:  Other imaging:    MEDICATIONS: 08-30    ·	acetaminophen     Tablet .. 1000 Oral every 6 hours  ·	diVALproex  Oral daily  ·	nortriptyline 10 Oral at bedtime  ·	albuterol/ipratropium for Nebulization 3 Nebulizer every 6 hours  ·	pantoprazole    Tablet 40 Oral before breakfast  ·	dexAMETHasone     Tablet 4 Oral every 6 hours  ·	insulin lispro (ADMELOG) corrective regimen sliding scale  SubCutaneous Before meals and at bedtime  ·	artificial  tears Solution 1 Both EYES every 4 hours PRN  ·	metoclopramide Injectable 10 IV Push every 4 hours PRN  ·	traMADol 25 Oral every 4 hours PRN  ·	traMADol 50 Oral every 4 hours PRN    IV FLUIDS: IVL  ·	niCARdipine Infusion 5 mG/Hr IV Continuous <Continuous> - 5mg/hr  DRIPS:  DIET: regular   Lines:  Drains:    Wounds:    CODE STATUS:  Full Code                       GOALS OF CARE:  aggressive                      DISPOSITION:  ICU

## 2022-08-30 NOTE — PROGRESS NOTE ADULT - SUBJECTIVE AND OBJECTIVE BOX
PM EVENTS: no acute overnight events as of documentation time of this note.    ROS: negative except as mentioned above.    T(C): 36.1 (08-30-22 @ 22:04), Max: 36.7 (08-30-22 @ 08:00)  HR: 92 (08-31-22 @ 00:00) (92 - 121)  BP: 107/63 (08-31-22 @ 00:00) (104/53 - 121/59)  RR: 16 (08-31-22 @ 00:00) (14 - 27)  SpO2: 100% (08-31-22 @ 00:00) (90% - 100%)        I&O's Summary    29 Aug 2022 07:01  -  30 Aug 2022 07:00  --------------------------------------------------------  IN: 2085 mL / OUT: 1670 mL / NET: 415 mL    30 Aug 2022 07:01  -  31 Aug 2022 00:42  --------------------------------------------------------  IN: 957.5 mL / OUT: 1430 mL / NET: -472.5 mL        EXAM:     [place] Coma Scale: 15    General: normocephalic, atraumatic, laying in bed, in no distress  Neuro     MS: A/Ox3, cooperative, normal attention, no neglect, comprehension intact, speech with preserved fluency, naming, and repetition    CN: PERRL, (+)L. inferior homonymous quadrantanopia, EOMI and no ptosis bilaterally, sensation intact to crude touch V1-V3, face symmetric, hearing grossly intact    Mot: bulk normal, tone normal, power 5/5 in bilateral upper and lower proximal extremities    Sens: intact to crude touch in bilateral upper and lower extremities    Reflexes: deferred    Coord: no dysmetria or ataxia on finger to nose/heel to shin, respectively, no focal bradykinetic movements    Gait: deferred  Chest: nonlabored respirations, no adventitious lung sounds bilaterally, heart regular rate/rhythm, present S1/S2, no murmurs or rubs  Abdomen: nondistended, soft and nontender without peritoneal signs, normoactive bowel sounds  Extremities: no clubbing, well-perfused, no edema      ABG - ( 29 Aug 2022 17:39 )  pH, Arterial: 7.39  pH, Blood: x     /  pCO2: 38    /  pO2: 89    / HCO3: 23    / Base Excess: -1.7  /  SaO2: 98.1                                    12.4   19.31 )-----------( 468      ( 30 Aug 2022 05:46 )             37.1     08-30    135  |  100  |  14  ----------------------------<  171<H>  4.1   |  25  |  0.70    Ca    8.8      30 Aug 2022 05:46  Phos  2.9     08-30  Mg     1.8     08-30        MEDICATIONS  (STANDING):  albuterol/ipratropium for Nebulization 3 milliLiter(s) Nebulizer every 6 hours  chlorhexidine 2% Cloths 1 Application(s) Topical <User Schedule>  chlorhexidine 4% Liquid 1 Application(s) Topical once  diVALproex  milliGRAM(s) Oral daily  insulin lispro (ADMELOG) corrective regimen sliding scale   SubCutaneous Before meals and at bedtime  niCARdipine Infusion 5 mG/Hr (25 mL/Hr) IV Continuous <Continuous>  nortriptyline 10 milliGRAM(s) Oral at bedtime  pantoprazole    Tablet 40 milliGRAM(s) Oral before breakfast    MEDICATIONS  (PRN):  acetaminophen     Tablet .. 650 milliGRAM(s) Oral every 6 hours PRN Temp greater or equal to 38C (100.4F), Mild Pain (1 - 3)  acetaminophen 300 mG/butalbital 50 mG/ caffeine 40 mG 1 Capsule(s) Oral every 6 hours PRN headache  artificial  tears Solution 1 Drop(s) Both EYES every 4 hours PRN Dry Eyes  metoclopramide Injectable 10 milliGRAM(s) IV Push every 4 hours PRN Nausea and vomittng  traMADol 75 milliGRAM(s) Oral every 4 hours PRN Severe Pain (7 - 10)  traMADol 50 milliGRAM(s) Oral every 4 hours PRN Moderate Pain (4 - 6)      Please see the day's note documented by Dr. Dorman for detailed ongoing assessment and plan.

## 2022-08-30 NOTE — PATIENT PROFILE ADULT - MONEY FOR FOOD
fingers/toes warm to touch/no paresthesia/no swelling/no cyanosis of extremity/capillary refill time < 2 seconds no

## 2022-08-30 NOTE — PATIENT PROFILE ADULT - FALL HARM RISK - HARM RISK INTERVENTIONS

## 2022-08-31 ENCOUNTER — TRANSCRIPTION ENCOUNTER (OUTPATIENT)
Age: 32
End: 2022-08-31

## 2022-08-31 VITALS — TEMPERATURE: 97 F

## 2022-08-31 LAB
ANION GAP SERPL CALC-SCNC: 7 MMOL/L — SIGNIFICANT CHANGE UP (ref 5–17)
BUN SERPL-MCNC: 15 MG/DL — SIGNIFICANT CHANGE UP (ref 7–23)
CALCIUM SERPL-MCNC: 8.8 MG/DL — SIGNIFICANT CHANGE UP (ref 8.4–10.5)
CHLORIDE SERPL-SCNC: 101 MMOL/L — SIGNIFICANT CHANGE UP (ref 96–108)
CO2 SERPL-SCNC: 29 MMOL/L — SIGNIFICANT CHANGE UP (ref 22–31)
CREAT SERPL-MCNC: 0.61 MG/DL — SIGNIFICANT CHANGE UP (ref 0.5–1.3)
EGFR: 121 ML/MIN/1.73M2 — SIGNIFICANT CHANGE UP
GLUCOSE BLDC GLUCOMTR-MCNC: 144 MG/DL — HIGH (ref 70–99)
GLUCOSE SERPL-MCNC: 153 MG/DL — HIGH (ref 70–99)
HCT VFR BLD CALC: 34.4 % — LOW (ref 34.5–45)
HGB BLD-MCNC: 11.7 G/DL — SIGNIFICANT CHANGE UP (ref 11.5–15.5)
MAGNESIUM SERPL-MCNC: 2.2 MG/DL — SIGNIFICANT CHANGE UP (ref 1.6–2.6)
MCHC RBC-ENTMCNC: 26.3 PG — LOW (ref 27–34)
MCHC RBC-ENTMCNC: 34 GM/DL — SIGNIFICANT CHANGE UP (ref 32–36)
MCV RBC AUTO: 77.3 FL — LOW (ref 80–100)
NRBC # BLD: 0 /100 WBCS — SIGNIFICANT CHANGE UP (ref 0–0)
PHOSPHATE SERPL-MCNC: 2.8 MG/DL — SIGNIFICANT CHANGE UP (ref 2.5–4.5)
PLATELET # BLD AUTO: 457 K/UL — HIGH (ref 150–400)
POTASSIUM SERPL-MCNC: 4.5 MMOL/L — SIGNIFICANT CHANGE UP (ref 3.5–5.3)
POTASSIUM SERPL-SCNC: 4.5 MMOL/L — SIGNIFICANT CHANGE UP (ref 3.5–5.3)
RBC # BLD: 4.45 M/UL — SIGNIFICANT CHANGE UP (ref 3.8–5.2)
RBC # FLD: 14.6 % — HIGH (ref 10.3–14.5)
SODIUM SERPL-SCNC: 137 MMOL/L — SIGNIFICANT CHANGE UP (ref 135–145)
WBC # BLD: 25.05 K/UL — HIGH (ref 3.8–10.5)
WBC # FLD AUTO: 25.05 K/UL — HIGH (ref 3.8–10.5)

## 2022-08-31 PROCEDURE — 83735 ASSAY OF MAGNESIUM: CPT

## 2022-08-31 PROCEDURE — 71045 X-RAY EXAM CHEST 1 VIEW: CPT

## 2022-08-31 PROCEDURE — 82565 ASSAY OF CREATININE: CPT

## 2022-08-31 PROCEDURE — C1760: CPT

## 2022-08-31 PROCEDURE — 85730 THROMBOPLASTIN TIME PARTIAL: CPT

## 2022-08-31 PROCEDURE — 84100 ASSAY OF PHOSPHORUS: CPT

## 2022-08-31 PROCEDURE — 84702 CHORIONIC GONADOTROPIN TEST: CPT

## 2022-08-31 PROCEDURE — 36415 COLL VENOUS BLD VENIPUNCTURE: CPT

## 2022-08-31 PROCEDURE — 85027 COMPLETE CBC AUTOMATED: CPT

## 2022-08-31 PROCEDURE — 94640 AIRWAY INHALATION TREATMENT: CPT

## 2022-08-31 PROCEDURE — C1894: CPT

## 2022-08-31 PROCEDURE — 82962 GLUCOSE BLOOD TEST: CPT

## 2022-08-31 PROCEDURE — C1889: CPT

## 2022-08-31 PROCEDURE — 80048 BASIC METABOLIC PNL TOTAL CA: CPT

## 2022-08-31 PROCEDURE — 99233 SBSQ HOSP IP/OBS HIGH 50: CPT

## 2022-08-31 PROCEDURE — C1887: CPT

## 2022-08-31 PROCEDURE — 85025 COMPLETE CBC W/AUTO DIFF WBC: CPT

## 2022-08-31 PROCEDURE — 83036 HEMOGLOBIN GLYCOSYLATED A1C: CPT

## 2022-08-31 PROCEDURE — 85347 COAGULATION TIME ACTIVATED: CPT

## 2022-08-31 PROCEDURE — 85610 PROTHROMBIN TIME: CPT

## 2022-08-31 PROCEDURE — 97161 PT EVAL LOW COMPLEX 20 MIN: CPT

## 2022-08-31 PROCEDURE — C1769: CPT

## 2022-08-31 PROCEDURE — 82803 BLOOD GASES ANY COMBINATION: CPT

## 2022-08-31 RX ADMIN — DIVALPROEX SODIUM 250 MILLIGRAM(S): 500 TABLET, DELAYED RELEASE ORAL at 12:20

## 2022-08-31 RX ADMIN — PANTOPRAZOLE SODIUM 40 MILLIGRAM(S): 20 TABLET, DELAYED RELEASE ORAL at 06:54

## 2022-08-31 RX ADMIN — CHLORHEXIDINE GLUCONATE 1 APPLICATION(S): 213 SOLUTION TOPICAL at 06:54

## 2022-08-31 NOTE — PHYSICAL THERAPY INITIAL EVALUATION ADULT - SENSORY TESTS
R hand dominant; (L) hand  5/5, (R) hand  5/5. CN Testing: B/L Frontalis intact; B/L buccinator intact; smile mild R droop; tongue protrusion at midline; B/L eyes open/close intact; Shoulder elevation: intact bilaterally; Vision H-Test: bilateral tracking and smooth pursuit intact; Convergence/Divergence: intact; Vision Quadrant Test: impaired L lower temporal

## 2022-08-31 NOTE — PROGRESS NOTE ADULT - THIS PATIENT HAS THE FOLLOWING CONDITION(S)/DIAGNOSES ON THIS ADMISSION:
None
Cerebral Edema/Brain Compression / Herniation
None
None
Cerebral Edema/Brain Compression / Herniation
Cerebral Edema/Brain Compression / Herniation

## 2022-08-31 NOTE — DISCHARGE NOTE PROVIDER - CARE PROVIDERS DIRECT ADDRESSES
[email or web address]

## 2022-08-31 NOTE — OCCUPATIONAL THERAPY INITIAL EVALUATION ADULT - GENERAL OBSERVATIONS, REHAB EVAL
PT Jose Alfredo present. Patient A&Ox4, agreeable and tolerated session well. Patient received semisupine in bed +tele, +heplock IV, +b/l SCD's, room air, NAD.

## 2022-08-31 NOTE — PHYSICAL THERAPY INITIAL EVALUATION ADULT - GENERAL OBSERVATIONS, REHAB EVAL
PT IE completed. Chart reviewed. Pt received semi-supine, NAD, +tele, +IV heplock, +B/L SCDs. DUNCAN Padilla cleared pt for PT.

## 2022-08-31 NOTE — DISCHARGE NOTE NURSING/CASE MANAGEMENT/SOCIAL WORK - PATIENT PORTAL LINK FT
You can access the FollowMyHealth Patient Portal offered by [facility] by registering at the following website: [email or web address]. By joining [facility]’s FollowMyHealth portal, you will also be able to view your health information using other applications (apps) compatible with our system.

## 2022-08-31 NOTE — DISCHARGE NOTE PROVIDER - NSDCMRMEDTOKEN_GEN_ALL_CORE_FT
Depakote 250 mg oral delayed release tablet: 1 tab(s) orally once a day   nortriptyline 10 mg oral capsule: 1 cap(s) orally once a day

## 2022-08-31 NOTE — PHYSICAL THERAPY INITIAL EVALUATION ADULT - PERTINENT HX OF CURRENT PROBLEM, REHAB EVAL
30 y/o right handed female with PMHx of migraines, pre-eclampsia presents for angio embolization of AVM 8/29/22. Now s/p cerebral angiogram for glue NBCA embolization of AVM from R PCA that drains to superior sagittal sinus (8/29/22).

## 2022-08-31 NOTE — DISCHARGE NOTE PROVIDER - HOSPITAL COURSE
HPI:  32 y/o right handed female, never smoker, pmhx migraines, pre-eclampsia presenting for cerebral angiogram with endovascular embolization of right PCA fistula. Pt presented with a sudden, unusual, severe HA and found to have R occipital intraparenchymal hemorrhage (4/2022). She had a diagnostic angiogram on 4/28/22 with Dr. Castro which showed mild small early shunting of the R distal PCA into the venous system. This finding was in the same area of IPH and thought to be either primary abnormality or secondary to IPH.   Repeat cerebral angiogram on 6/16/2022 which showed small early shunting of the right parieto-occipital artery which seemed worse in comparison to the previous cerebral DSA. Pt presents today for treatment of right PCA fistula. Patient reports that left lower visual field cut has improved. Has had neuro-opthalmology exam which was normal. Has intermittent headaches with aura which she takes depakote and nortriptyline.     Hospital Course:  8/29: Now POD#0 s/p cerebral angiogram for glue NBCA embolization of AVM from R 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   8/30: POD1 glue embo AVM  8/31: POD#2. ASIA overnight. Pain well controlled.       Patient evaluated by PT/OT who recommended home with no needs   Patient is going home    Uncomplicated hospital course    Exam on day of discharge:  General: NAD, pt is comfortably sitting up in bed, A&O x3, on 6L NC   HEENT: CN II-XII grossly intact, PERRL 3mm, EOMI b/l, face symmetric, tongue midline, neck FROM  Cardiovascular: RRR, normal S1 and S2   Respiratory: lungs CTAB, no wheezing, rhonchi, or crackles   GI: normoactive BS to auscultation, abd soft, NTND   Neuro: no aphasia, speech clear, no dysmetria, no pronator drift  strength 5/5 throughout all 4 extremities  sensation intact to light touch throughout   Extremities: distal pulses 2+ x4   Wound/incision: R groin site C/D/I, no evidence of hematoma, steri-strips in place       The patient is neurologically stable for discharge. Labs and vitals are stable. Pain is adequately controlled.

## 2022-08-31 NOTE — PROGRESS NOTE ADULT - REASON FOR ADMISSION
cerebral angiogram with endovascular embolization of brain AVM
4 = No assist / stand by assistance

## 2022-08-31 NOTE — OCCUPATIONAL THERAPY INITIAL EVALUATION ADULT - DIAGNOSIS, OT EVAL
Patient POD #2 s/p cerebral angiogram for glue NBCA embolization of AVM from R PCA that drains to superior sagittal sinus, presents with L eye inferior temporal field cut from previous hospitalization however does not impacting independence with functional activities and mobility. Patient demonstrates safe and independent performance of ADL's and functional mobility with no AD and is d/c off skilled OT at this time.

## 2022-08-31 NOTE — PROGRESS NOTE ADULT - ASSESSMENT
31y/F with  cerebral AVM, brain compression  migraine  h/o ICH  h/o L inferior homonymous quadrantanopsia  h/o TIA  PCOS    PLAN:   NEURO: neurochecks q4h, PRN pain meds with acetaminophen, tramadol  SBP control, off steroids  migraines divalproic  nortriptyline  REHAB:  physical therapy evaluation and management    EARLY MOB:  OOB to chair    PULM:  room air  CARDIO:  SBP goal 100-120 mm Hg  ENDO:  Blood sugar goals 140-180 mg/dL, continue insulin sliding scale  GI:  PPI for GI prophylaxisd/c PPI  DIET: regular   RENAL:  IVL   HEM/ONC: Hb stable  VTE Prophylaxis: SCDs, no DVT chem (going home)  ID: afebrile, leukocytosis likely from steroids and from procedure  Social: will update family    Active issues:  What's keeping patient in the ICU? none  What is this patient's dispo plan? home    ATTENDING ATTESTATION:  I was physically present for the key portions of the evaluation and management (E/M) service provided.  I agree with the above history, physical and plan which I have reviewed and edited where appropriate.     Patient not at high risk for neurologic deterioration / death.  Time spent on this noncritically ill patient: 45 minutes spent on total encounter, more than 50% of the visit was spent counseling and/or coordinating care by the attending physician.    Plan discussed with RN, house staff.    REVIEW OF SYSTEMS:  No headaches, no nausea or vomiting; 14 -point review of systems otherwise unremarkable.    ICU stepdown Checklist:    Completed: 08-31 @ 11:57    [X] hemodynamically stable – VS WNL and stable x 24hours, UO adequate  [n/a ] if  previously on HDA - off pressors x 24h with stable neuro exam    [X] no new symptoms x 24h (i.e. new fever, new-onset nausea/vomiting)  [X] stable labs: (i.e. WBC not rising, sodium not dropping)  [X] patient not at high risk for aspiration, if high risk then:                  [ ] should have definitive plans for trach/PEG (alternative option is to discharge from ICU to facilty)                  [ ] stepdown to bed close to nurse’s station  [n/a] low suctioning requirements (i.e. q4h or less)  [X] sign-off from primary RN*  [X] drains do not require ICU level of care  [X] if patient previously agitated or with behavioral issues – controlled   [X] pain controlled

## 2022-08-31 NOTE — PROGRESS NOTE ADULT - SUBJECTIVE AND OBJECTIVE BOX
=================================  NEUROCRITICAL CARE ATTENDING NOTE  =================================    ISREAL DOWELL   MRN-[id number]  Summary:  31y/F right handed female, never smoker, pmhx migraines, pre-eclampsia presenting for cerebral angiogram with endovascular embolization of right PCA fistula. Pt presented with a sudden, unusual, severe HA and found to have R occipital intraparenchymal hemorrhage (4/2022). She had a diagnostic angiogram on 4/28/22 with Dr. Castro which showed mild small early shunting of the R distal PCA into the venous system. This finding was in the same area of IPH and thought to be either primary abnormality or secondary to IPH.   Repeat cerebral angiogram on 6/16/2022 which showed small early shunting of the right parieto-occipital artery which seemed worse in comparison to the previous cerebral DSA. Pt presents today for treatment of right PCA fistula. Patient reports that left lower visual field cut has improved. Has had neuro-opthalmology exam which was normal. Has intermittent headaches with aura which she takes depakote and nortriptyline.  (29 Aug 2022 06:57)    COURSE IN THE HOSPITAL:  08/29 Admitted to [facility], s/p angio embo  08/30 POD#1 on NRB overnight, desats to high 80s off NRB, c/o   08/31 POD#2 No significant events overnight.     Past Medical History: Bicornuate uterus Migraine headache TIA (transient ischemic attack) PCOS (polycystic ovarian syndrome) Obesity Cerebrovascular dural AV fistula  Allergies:  No Known Allergies  Home meds:   ·	Depakote 250 mg oral delayed release tablet: 1 tab(s) orally once a day   ·	nortriptyline 10 mg oral capsule: 1 cap(s) orally once a day    PHYSICAL EXAMINATION  T(C): 36.1 (08-31 @ 10:00), Max: 36.7 (08-30 @ 14:05) HR: 84 (08-31 @ 11:00) (80 - 117) BP: 109/60 (08-31 @ 11:00) (100/62 - 121/59) RR: 17 (08-31 @ 11:00) (14 - 27) SpO2: 96% (08-31 @ 11:00) (90% - 100%)  NEUROLOGIC EXAMINATION:  Patient is awake, alert, fully oriented, pupils 2-3mm equal and briskly reactive to light, EOMs intact, moves all 4s, no visual field cuts  GENERAL: not intubated, not in cardiorespiratory distress  EENT:  anicteric  CARDIOVASCULAR: (+) S1 S2, tachycardic rate and regular rhythm  PULMONARY: clear to auscultation bilaterally  ABDOMEN: soft, nontender with normoactive bowel sounds  EXTREMITIES: no edema  SKIN: no rash    LABS: 08-31  CAPILLARY BLOOD GLUCOSE 144 174 170     (19.31)  11.7   25.05 )-----------( 457      ( 31 Aug 2022 05:05 )             34.4     137  |  101  |  15  ----------------------------<  153<H>  4.5   |  29  |  0.61    Ca    8.8      31 Aug 2022 05:05  Phos  2.8     08-31  Mg     2.2     08-31 08-30 @ 07:01  -  08-31 @ 07:00  IN: 957.5 mL / OUT: 1430 mL / NET: -472.5 mL     Bacteriology:  CSF studies:  EEG:  Neuroimaging:  Other imaging:    MEDICATIONS: 08-31    ·	diVALproex  Oral daily  ·	nortriptyline 10 Oral at bedtime  ·	albuterol/ipratropium for Nebulization 3 Nebulizer every 6 hours  ·	pantoprazole    Tablet 40 Oral before breakfast  ·	insulin lispro (ADMELOG) corrective regimen sliding scale  SubCutaneous Before meals and at bedtime  ·	acetaminophen     Tablet .. 650 Oral every 6 hours PRN  ·	acetaminophen 300 mG/butalbital 50 mG/ caffeine 40 mG 1 Oral every 6 hours PRN  ·	artificial  tears Solution 1 Both EYES every 4 hours PRN  ·	metoclopramide Injectable 10 IV Push every 4 hours PRN  ·	traMADol 75 Oral every 4 hours PRN  ·	traMADol 50 Oral every 4 hours PRN    IV FLUIDS: IVL  ·	niCARdipine Infusion 5 mG/Hr IV Continuous <Continuous> - OFF  DRIPS:  DIET: regular   Lines:  Drains:    Wounds:    CODE STATUS:  Full Code                       GOALS OF CARE:  aggressive                      DISPOSITION:  Home

## 2022-08-31 NOTE — PHYSICAL THERAPY INITIAL EVALUATION ADULT - ADDITIONAL COMMENTS
Pt reports living in house with 1 FOS to enter. Reports living with  and daughter. Reports being independent with daily activities without use of DME.

## 2022-08-31 NOTE — DISCHARGE NOTE NURSING/CASE MANAGEMENT/SOCIAL WORK - NSDCPEFALRISK_GEN_ALL_CORE
For information on Fall & Injury Prevention, visit: [email or web address] OR  [email or web address] OR  https://www.cdc.gov/steadi/patient.html

## 2022-08-31 NOTE — OCCUPATIONAL THERAPY INITIAL EVALUATION ADULT - ADDITIONAL COMMENTS
Patient reports living with her , daughter and dog in a private home with 1 FOS to enter and 1 FOS to the second floor where the bedroom/bathroom is. Patient states she was independent with all ADL's, IADL's and functional mobility with no AD. Patient is R hand dominant and has a bathtub shower. Patient is currently not working at this time and is taking care of her daughter.

## 2022-08-31 NOTE — DISCHARGE NOTE NURSING/CASE MANAGEMENT/SOCIAL WORK - NSDCFUADDAPPT_GEN_ALL_CORE_FT
Please follow up with Dr. Galindo in office. Call the office to confirm appointment date/time    Follow-up with your primary care doctor

## 2022-08-31 NOTE — OCCUPATIONAL THERAPY INITIAL EVALUATION ADULT - REHAB POTENTIAL, OT EVAL
Patient demonstrates safe and independent performance of ADL's and functional mobility with no AD and is d/c off skilled OT at this time.

## 2022-08-31 NOTE — OCCUPATIONAL THERAPY INITIAL EVALUATION ADULT - PERTINENT HX OF CURRENT PROBLEM, REHAB EVAL
32 y/o right handed female with PMHx of migraines, pre-eclampsia presents for angio embolization of AVM 8/29/22. Now s/p cerebral angiogram for glue NBCA embolization of AVM from R PCA that drains to superior sagittal sinus (8/29/22).

## 2022-08-31 NOTE — DISCHARGE NOTE PROVIDER - NSDCFUADDINST_GEN_ALL_CORE_FT
Andrez Gaxiola(Attending) Neurosurgery follow up appointment date/time:  - you have steri-strips (small pieces of tape) over the groin puncture site, please do not take these off they will fall off on their own, you can shower with them in place  - please call the office to confirm appointment: [phone]     Wound Care:  - you may shower regularly      Activity:  - fatigue is common after surgery, rest if you feel tired   - no bending, lifting, twisting or heavy lifting   - walking is recommended, ambulate as tolerated  - you may shower when you get home, keep puncture site clean and dry, you may shower with the steri-strips on   - no driving within 24 hours of anesthesia or while taking prescription pain medications   - keep hydrated, drink plenty of water     Please also follow up with your primary care doctor.     Medications:  - you may take extra strength tylenol as needed for pain   - please take home medications as prescribed  - you may continue your depakote and nortriptyline   - Avoid taking Advil (ibuprofen), Motrin (naproxen), or Aspirin for pain as they can cause bleeding     Call the office or come to ED if:  - wound has drainage or bleeding, increased redness or pain at incision/puncture site, neurological change, fever (>101), chills, night sweats, syncope, nausea/vomiting      Playback:  - discharge instructions uploaded to playback    WITHIN 24 HOURS OF DISCHARGE, PLEASE CONTACT NEURO PA  WITH ANY QUESTIONS OR CONCERNS: [phone]   OTHERWISE, PLEASE CALL THE OFFICE WITH ANY QUESTIONS OR CONCERNS: [phone]

## 2022-08-31 NOTE — DISCHARGE NOTE PROVIDER - CARE PROVIDER_API CALL
External INR result from [facility], result of 2.4. Goal range should be 2.0-3.0.    Colton Galindo)  Neurosurgery  [address], [address] [place] [address]  Phone: [phone]  Fax: [phone]  Follow Up Time:     Branden Gonzalez)  Neurology; Vascular Neurology  [address], [address] [place], [place] [address]  Phone: [phone]  Fax: [phone]  Follow Up Time:

## 2022-08-31 NOTE — PHYSICAL THERAPY INITIAL EVALUATION ADULT - MANUAL MUSCLE TESTING RESULTS, REHAB EVAL
MMT performed: (R)UE and (R)LE 5/5 for all major pivots; (L)shoulder flexion 5/5, (L)elbow flexion 5/5, (L)elbow extension 5/5, (L)wrist extension N/T, (L)wrist flexion N/T; (L)hip flexion 5/5, (L)knee extension 5/5, (L)knee flexion 5/5, (L)ankle DF 5/5, (L)ankle PF 5/5

## 2022-08-31 NOTE — DISCHARGE NOTE PROVIDER - NSDCCPTREATMENT_GEN_ALL_CORE_FT
PRINCIPAL PROCEDURE  Procedure: Angiogram, cerebral, with embolization  Findings and Treatment:

## 2022-08-31 NOTE — PHYSICAL THERAPY INITIAL EVALUATION ADULT - PREDICTED DURATION OF THERAPY (DAYS/WKS), PT EVAL
Pt to be D/C from skilled PT in acute care setting as pt demo independence during functional mobility

## 2022-08-31 NOTE — PROGRESS NOTE ADULT - SUBJECTIVE AND OBJECTIVE BOX
S/Overnight events:  remains on cardene, increased tramadol for headaches       Hospital Course:   8/29: Now POD#0 s/p cerebral angiogram for glue NBCA embolization of AVM from R 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   8/30: POD1 glue embo AVM     Vital Signs Last 24 Hrs  T(C): 36.1 (30 Aug 2022 22:04), Max: 36.7 (30 Aug 2022 08:00)  T(F): 97 (30 Aug 2022 22:04), Max: 98 (30 Aug 2022 08:00)  HR: 84 (31 Aug 2022 05:00) (82 - 121)  BP: 103/55 (31 Aug 2022 05:00) (100/62 - 121/59)  BP(mean): 71 (31 Aug 2022 05:00) (71 - 84)  RR: 16 (31 Aug 2022 05:00) (14 - 27)  SpO2: 100% (31 Aug 2022 05:00) (90% - 100%)    Parameters below as of 31 Aug 2022 05:00  Patient On (Oxygen Delivery Method): room air        I&O's Detail    29 Aug 2022 07:01  -  30 Aug 2022 07:00  --------------------------------------------------------  IN:    IV PiggyBack: 100 mL    NiCARdipine: 920 mL    Oral Fluid: 365 mL    sodium chloride 0.9%: 700 mL  Total IN: 2085 mL    OUT:    Indwelling Catheter - Urethral (mL): 1670 mL  Total OUT: 1670 mL    Total NET: 415 mL      30 Aug 2022 07:01  -  31 Aug 2022 05:37  --------------------------------------------------------  IN:    IV PiggyBack: 100 mL    NiCARdipine: 100 mL    NiCARdipine: 127.5 mL    Oral Fluid: 630 mL  Total IN: 957.5 mL    OUT:    Indwelling Catheter - Urethral (mL): 180 mL    Voided (mL): 1250 mL  Total OUT: 1430 mL    Total NET: -472.5 mL        I&O's Summary    29 Aug 2022 07:01  -  30 Aug 2022 07:00  --------------------------------------------------------  IN: 2085 mL / OUT: 1670 mL / NET: 415 mL    30 Aug 2022 07:01  -  31 Aug 2022 05:37  --------------------------------------------------------  IN: 957.5 mL / OUT: 1430 mL / NET: -472.5 mL        Exam:  General: NAD, pt is comfortably sitting up in bed, A&O x3, on 6L NC   HEENT: CN II-XII grossly intact, PERRL 3mm, EOMI b/l, face symmetric, tongue midline, neck FROM  Cardiovascular: RRR, normal S1 and S2   Respiratory: lungs CTAB, no wheezing, rhonchi, or crackles   GI: normoactive BS to auscultation, abd soft, NTND   Neuro: no aphasia, speech clear, no dysmetria, no pronator drift  strength 5/5 throughout all 4 extremities  sensation intact to light touch throughout   Extremities: distal pulses 2+ x4   Wound/incision: R groin site dressing C/D/I, no evidence of hematoma     LABS:                        11.7   25.05 )-----------( 457      ( 31 Aug 2022 05:05 )             34.4     08-31    137  |  101  |  15  ----------------------------<  153<H>  4.5   |  29  |  0.61    Ca    8.8      31 Aug 2022 05:05  Phos  2.8     08-31  Mg     2.2     08-31      PT/INR - ( 29 Aug 2022 07:45 )   PT: 11.9 sec;   INR: 1.00          PTT - ( 29 Aug 2022 07:45 )  PTT:32.5 sec        CAPILLARY BLOOD GLUCOSE      POCT Blood Glucose.: 174 mg/dL (30 Aug 2022 21:54)  POCT Blood Glucose.: 170 mg/dL (30 Aug 2022 16:14)  POCT Blood Glucose.: 174 mg/dL (30 Aug 2022 09:35)  POCT Blood Glucose.: 105 mg/dL (30 Aug 2022 05:39)      Drug Levels: [] N/A    CSF Analysis: [] N/A      Allergies    No Known Allergies    Intolerances      MEDICATIONS:  Antibiotics:    Neuro:  acetaminophen     Tablet .. 650 milliGRAM(s) Oral every 6 hours PRN  acetaminophen 300 mG/butalbital 50 mG/ caffeine 40 mG 1 Capsule(s) Oral every 6 hours PRN  diVALproex  milliGRAM(s) Oral daily  metoclopramide Injectable 10 milliGRAM(s) IV Push every 4 hours PRN  nortriptyline 10 milliGRAM(s) Oral at bedtime  traMADol 75 milliGRAM(s) Oral every 4 hours PRN  traMADol 50 milliGRAM(s) Oral every 4 hours PRN    Anticoagulation:    OTHER:  albuterol/ipratropium for Nebulization 3 milliLiter(s) Nebulizer every 6 hours  artificial  tears Solution 1 Drop(s) Both EYES every 4 hours PRN  chlorhexidine 2% Cloths 1 Application(s) Topical <User Schedule>  chlorhexidine 4% Liquid 1 Application(s) Topical once  insulin lispro (ADMELOG) corrective regimen sliding scale   SubCutaneous Before meals and at bedtime  niCARdipine Infusion 5 mG/Hr IV Continuous <Continuous>  pantoprazole    Tablet 40 milliGRAM(s) Oral before breakfast    ASSESSMENT:  32 y/o right handed female with PMHx of migraines, pre-eclampsia presents for angio embolization of AVM 8/29/22. Now s/p cerebral angiogram for glue NBCA embolization of AVM from R PCA that drains to superior sagittal sinus (8/29/22).     PLAN:  NEURO  - neuro checks/vital signs q1hr   - decadron x3days   - cont home depakote 250mg qd, Fioricet   - cont home nortriptyline 10mg qd     CARDIO  - SBP goal 100-120  - cardene gtt    PULM  - NC, SpO2 goal >90%  - CXR post-op   - encourage IS    GI   - regular diet  - bowel regimen   - PPI while on steroids     RENAL/  - Na goal 135-145  - NS held at this time due to pulmonary congestion on CXR, pt tolerating PO diet   - Remove rice/ TOV     ENDO  - ISS while on steroids, A1C = 5.6     ID  - afebrile, trend leukocytosis    HEME  - SCDs for DVT ppx     DISPO: ICU status, full code, dispo pend     Assessment and Plan d/w Dr. Galindo and Dr. Dorman

## 2022-08-31 NOTE — DISCHARGE NOTE PROVIDER - NSDCFUADDAPPT_GEN_ALL_CORE_FT
Please follow up with Dr. Galindo in office. Call the office to confirm appointment date/time    Follow-up with your primary care doctor  Please follow up with Dr. Galindo in office on 9/15/22 at 10:00am. Call the office to confirm date/time    Follow-up with your primary care doctor

## 2022-09-07 DIAGNOSIS — Z86.79 PERSONAL HISTORY OF OTHER DISEASES OF THE CIRCULATORY SYSTEM: ICD-10-CM

## 2022-09-07 DIAGNOSIS — Q51.3 BICORNATE UTERUS: ICD-10-CM

## 2022-09-07 DIAGNOSIS — Q28.2 ARTERIOVENOUS MALFORMATION OF CEREBRAL VESSELS: ICD-10-CM

## 2022-09-07 DIAGNOSIS — E28.2 POLYCYSTIC OVARIAN SYNDROME: ICD-10-CM
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
## 2022-09-07 DIAGNOSIS — J98.11 ATELECTASIS: ICD-10-CM

## 2022-09-07 DIAGNOSIS — Z86.73 PERSONAL HISTORY OF TRANSIENT ISCHEMIC ATTACK (TIA), AND CEREBRAL INFARCTION WITHOUT RESIDUAL DEFICITS: ICD-10-CM

## 2022-09-07 DIAGNOSIS — T38.0X5A ADVERSE EFFECT OF GLUCOCORTICOIDS AND SYNTHETIC ANALOGUES, INITIAL ENCOUNTER: ICD-10-CM

## 2022-09-07 DIAGNOSIS — H53.462 HOMONYMOUS BILATERAL FIELD DEFECTS, LEFT SIDE: ICD-10-CM

## 2022-09-07 DIAGNOSIS — G47.33 OBSTRUCTIVE SLEEP APNEA (ADULT) (PEDIATRIC): ICD-10-CM

## 2022-09-07 DIAGNOSIS — E66.9 OBESITY, UNSPECIFIED: ICD-10-CM

## 2022-09-07 DIAGNOSIS — D72.828 OTHER ELEVATED WHITE BLOOD CELL COUNT: ICD-10-CM

## 2022-09-07 DIAGNOSIS — G43.909 MIGRAINE, UNSPECIFIED, NOT INTRACTABLE, WITHOUT STATUS MIGRAINOSUS: ICD-10-CM

## 2022-09-14 ENCOUNTER — NON-APPOINTMENT (OUTPATIENT)
Age: 32
End: 2022-09-14

## 2022-09-15 ENCOUNTER — APPOINTMENT (OUTPATIENT)
Dept: NEUROSURGERY | Facility: CLINIC | Age: 32
End: 2022-09-15

## 2022-09-15 PROCEDURE — 99202 OFFICE O/P NEW SF 15 MIN: CPT | Mod: 95

## 2022-09-19 NOTE — HISTORY OF PRESENT ILLNESS
[FreeTextEntry1] : Patient is 32 yo RH woman, never smoker, with FMHx of Hemophilia A and PMHx of migraines formerly on Imitrex, pre-eclampsia (normotensive afterwards w/o meds) and PCOS who presents after repeat diagnostic cerebral angiogram on 6/16/22 to evaluate for intracranial vessel abnormality that may have led to R occipital intraparenchymal hemorrhage found during work up for sudden, unusual, severe HA in 4/2022. Her initial diagnostic cerebral angiogram with Dr. Castro on 4/28/22 showed mild small early shunting of the R distal PCA into the venous system. This finding was in the same area of IPH and thought to be either primary abnormality or secondary to IPH.  \par \par Repeat cerebral angiogram in 6/16/2022 showed small early shunting of the right parieto-occipital artery which seemed WORSE in comparison to the previous cerebral DSA. This can be secondary to the regional IPH or indication of underlying vascular malformation. No significant beading of intracranial vessels noticed. \par \par She was in the ED post-op on 6/17/22 w/ c/o of mild frontal HA and associated n/v. She was found to have leukocytosis to wbc 17 and mildly febrile. CTH was stable and NIHSS 0, eventually felt back to baseline.  \par \par Her case was discussed at [facility] Conference. \par \par Today, she states that she feels well and is getting a second opinion at [facility]. Patient denies erythema, edema, pain or ecchymosis of Right groin access site and has no discomfort in the RLE. She denies easy bleeding/bruising. She states that when she lies down, her L eye lateral inferior quadrant defect improves. \par \par \par  Jessica Moreno(Resident)

## 2022-09-21 ENCOUNTER — NON-APPOINTMENT (OUTPATIENT)
Age: 32
End: 2022-09-21

## 2022-09-22 ENCOUNTER — APPOINTMENT (OUTPATIENT)
Dept: OBGYN | Facility: CLINIC | Age: 32
End: 2022-09-22

## 2022-09-22 VITALS — WEIGHT: 208 LBS | BODY MASS INDEX: 38.04 KG/M2 | DIASTOLIC BLOOD PRESSURE: 76 MMHG | SYSTOLIC BLOOD PRESSURE: 118 MMHG

## 2022-09-22 DIAGNOSIS — N89.8 OTHER SPECIFIED NONINFLAMMATORY DISORDERS OF VAGINA: ICD-10-CM

## 2022-09-22 DIAGNOSIS — N76.2 ACUTE VULVITIS: ICD-10-CM

## 2022-09-22 DIAGNOSIS — B37.3 CANDIDIASIS OF VULVA AND VAGINA: ICD-10-CM

## 2022-09-22 LAB
GLUCOSE UR-MCNC: NORMAL
HGB UR QL STRIP.AUTO: NORMAL
LEUKOCYTE ESTERASE UR QL STRIP: NORMAL
PROT UR STRIP-MCNC: NORMAL

## 2022-09-22 PROCEDURE — 81002 URINALYSIS NONAUTO W/O SCOPE: CPT

## 2022-09-22 PROCEDURE — 99214 OFFICE O/P EST MOD 30 MIN: CPT

## 2022-09-22 NOTE — HISTORY OF PRESENT ILLNESS
[FreeTextEntry1] : Patient is 32 yo RH woman, never smoker, with FMHx of Hemophilia A and PMHx of migraines formerly on Imitrex, pre-eclampsia (normotensive afterwards w/o meds) and had  diagnostic cerebral angiogram on 6/16/22 to evaluate for intracranial vessel abnormality that may have led to R occipital intraparenchymal hemorrhage found during work up for sudden, unusual, severe HA in 4/2022. Her initial diagnostic cerebral angiogram with Dr. Castro on 4/28/22 showed mild small early shunting of the R distal PCA into the venous system. This finding was in the same area of IPH and thought to be either primary abnormality or secondary to IPH.  \par \par Repeat cerebral angiogram in 6/16/2022 showed small early shunting of the right parieto-occipital artery which seemed WORSE in comparison to the previous cerebral DSA. This can be secondary to the regional IPH or indication of underlying vascular malformation. No significant beading of intracranial vessels noticed. \par \par Pt is here today post op visit  s/p 8/29/2022  Cerebral angiogram and embolization of AV Fistula\par Pt is here via telehealth to review pt\par Doing well   at times c/o left visual deficits and noticed some white flashes \par

## 2022-09-22 NOTE — HISTORY OF PRESENT ILLNESS
[FreeTextEntry1] : Patient is 32 years old para 0-1-2-1 last menstrual period August 29, 2022.\par Patient states that she notes vaginal discharge with vulvar irritation and inflammation.

## 2022-09-22 NOTE — REASON FOR VISIT
[Home] : at home, [unfilled] , at the time of the visit. [Medical Office: ([facility])___] : at the medical office located in  [Patient] : the patient [de-identified] : Cerebral angiogram and embolization of AVM [de-identified] : 8/29/2022

## 2022-09-22 NOTE — ADDENDUM
[FreeTextEntry1] : PATIENT:	Ana Laura Benítez 				\par \par \par Thursday, September 15, 2022\par \par I saw Ana Laura in telehealth consultation.  She was at her home address, and I was in the [facility] Neurosurgery offices.  We spent approximately 15 minutes discussing her aftercare.  She is status post NBCA embolization of a left parietal AVM that was successful by Dr. Gonzalez with complete cure.  I have given her the green light to return to her previous quality of life.  I do not see any reason to be concerned.  She has had some, what sound like, ophthalmic migraines, and I have recommended that she follow up with her neurologist in [place].  Overall, I am very happy with her progress, and she will be seeing Dr. Gonzalez in followup as well.\par \par \par \par Colton Galindo MD\par \par HENRIQUE/ag [facility] #[id number]\par

## 2022-10-13 NOTE — OCCUPATIONAL THERAPY INITIAL EVALUATION ADULT - SOCIAL CONCERNS
"Subjective:   Truong Levi  is a 19 y.o. male who presents for Pharyngitis (Headache x 2 days )      Pharyngitis   Associated symptoms include headaches. Pertinent negatives include no abdominal pain, coughing, diarrhea, ear pain, shortness of breath or vomiting.   Patient presents urgent care complaining of sore throat times last 2 days.  Notes sore throat only on right side.  Complains of full lymph node on right side as well as generalized headache.  Headache without specific nausea.  Notes mild photophobia.  Denies phonophobia.  Denies cough.  Denies ear pain.  Denies nausea vomiting abdominal pain diarrhea or rash.  Past medical history of some recurrence of strep and suspects with white patches on right tonsil.  Past medical history of AOM but denies ear pain currently.  Has tried treatment with OTC anti-inflammatories.  Denies fever but did have some chills/night sweats last night.    Review of Systems   Constitutional:  Positive for chills. Negative for fever.   HENT:  Positive for sore throat. Negative for ear pain.    Eyes:  Positive for photophobia.   Respiratory:  Negative for cough, sputum production, shortness of breath and wheezing.    Gastrointestinal:  Negative for abdominal pain, diarrhea, nausea and vomiting.   Musculoskeletal:  Positive for myalgias.   Skin:  Negative for rash.   Neurological:  Positive for headaches.     No Known Allergies     Objective:   /66   Pulse 94   Temp 37.2 °C (99 °F) (Temporal)   Resp 16   Ht 1.727 m (5' 8\")   Wt 81.6 kg (179 lb 12.8 oz)   SpO2 96%   BMI 27.34 kg/m²     Physical Exam  Vitals and nursing note reviewed.   Constitutional:       General: He is not in acute distress.     Appearance: Normal appearance. He is well-developed. He is not diaphoretic.   HENT:      Head: Normocephalic and atraumatic.      Right Ear: Tympanic membrane, ear canal and external ear normal.      Left Ear: Ear canal and external ear normal. Tympanic membrane is " erythematous.      Nose: Nose normal.      Mouth/Throat:      Lips: Pink.      Mouth: Mucous membranes are moist.      Pharynx: Uvula midline. Posterior oropharyngeal erythema (deeper) present. No oropharyngeal exudate or uvula swelling.      Tonsils: Tonsillar exudate present. No tonsillar abscesses. 2+ on the right. 2+ on the left.   Eyes:      General: No scleral icterus.        Right eye: No discharge.         Left eye: No discharge.      Conjunctiva/sclera: Conjunctivae normal.   Pulmonary:      Effort: Pulmonary effort is normal. No respiratory distress.      Breath sounds: Normal breath sounds. No stridor. No decreased breath sounds, wheezing, rhonchi or rales.   Musculoskeletal:         General: Normal range of motion.      Cervical back: Neck supple.   Skin:     General: Skin is warm and dry.      Coloration: Skin is not pale.   Neurological:      Mental Status: He is alert and oriented to person, place, and time.      Coordination: Coordination normal.   POCT strep - NEG  POCT MONO - NEG    Assessment/Plan:   1. Exudative tonsillitis  - POCT Rapid Strep A  - POCT Mononucleosis (mono)  - amoxicillin (AMOXIL) 500 MG Cap; Take 1 Capsule by mouth 3 times a day.  Dispense: 30 Capsule; Refill: 0  - lidocaine (XYLOCAINE) 2 % Solution; Take 5 mL by mouth as needed for Throat/Mouth Pain (q6hr PRN throat pain, ok to rinse and spit or swallow).  Dispense: 120 mL; Refill: 0  - ibuprofen (MOTRIN) 600 MG Tab; Take 1 Tablet by mouth every 6 hours as needed for Headache, Fever or Inflammation.  Dispense: 30 Tablet; Refill: 0    2. Nonintractable headache, unspecified chronicity pattern, unspecified headache type  Supportive care is reviewed with patient/caregiver - recommend to push PO fluids and electrolytes,  take full course of Rx, take with probiotics, observe for resolution  Return to clinic with lack of resolution or progression of symptoms.  Sent w/ work note    I have worn an N95 mask, gloves and eye protection  for the entire encounter with this patient.     Differential diagnosis, natural history, supportive care, and indications for immediate follow-up discussed.        None

## 2022-10-16 NOTE — ED PROVIDER NOTE - CLINICAL SUMMARY MEDICAL DECISION MAKING FREE TEXT BOX
IMPRESSION:  Angioedema-improving  Ho angioedema    PLAN:    CNS: avoid sedatives    HEENT: Oral care    PULMONARY:  HOB @ 45 degrees.  Aspiration precautions. Prednisone 60 mg.     CARDIOVASCULAR: avoid volume overload    GI: GI prophylaxis.  Feeding.  Bowel regimen     RENAL:  Follow up lytes.  Correct as needed    INFECTIOUS DISEASE: Follow up cultures    HEMATOLOGICAL:  DVT prophylaxis. H1, H2 blocker, benadryl PRN. Needs allergy appointment as outpatient.    ENDOCRINE:  Follow up FS.  Insulin protocol if needed.    MUSCULOSKELETAL: OOBTC    possible DG in PM           Pt is a 22yo Female who presents with upper lip edema since yesterday - almost completely resolved. Pt tolerating regular diet, no vocal or throat symptoms.     ·	allergy w/u as OP  ·	epi pen on D/C home  ·	no acute ENT intervention  ·	ok to downgrade  ·	w/d with attng pt s/o to me, waiting neuro f/u and CT imaging  Patient feeling better wants to go home will follow up with neurologist

## 2022-10-21 ENCOUNTER — APPOINTMENT (OUTPATIENT)
Dept: NEUROLOGY | Facility: CLINIC | Age: 32
End: 2022-10-21

## 2022-10-21 VITALS
OXYGEN SATURATION: 98 % | TEMPERATURE: 97.8 F | BODY MASS INDEX: 34.96 KG/M2 | HEART RATE: 85 BPM | HEIGHT: 62 IN | SYSTOLIC BLOOD PRESSURE: 111 MMHG | WEIGHT: 190 LBS | DIASTOLIC BLOOD PRESSURE: 69 MMHG

## 2022-10-21 PROCEDURE — 99213 OFFICE O/P EST LOW 20 MIN: CPT

## 2022-11-01 NOTE — PHYSICAL EXAM
[FreeTextEntry1] : NIH STROKE SCALE \par \par Item	 Score \par \par 1 a.	Level of Consciousness	 0 \par 1 b. LOC Questions	 0 \par 1 c.	LOC Commands	 0 \par 2.	Best Gaze	 1 \par 3.	Visual	 0 \par 4.	Facial Palsy 0 \par 5 a.	Motor Arm - Left	 0 \par 5 b.	Motor Arm - Right	 0 \par 6 a.	Motor Leg - Left	 0 \par 6 b.	Motor Leg - Right	 0 \par 7.	Limb Ataxia	 0 \par 8.	Sensory	 0 \par 9.	Language	 0 \par 10.	Dysarthria	 0 \par 11.	Extinction and Inattention 	 0 \par __________________________________________ \par \par TOTAL	 1 \par \par mRS: 0 No symptoms at all \par \par \par Neurologic Exam: \par \par Mental status: Awake, alert and oriented x 4. Recent and remote memory intact. \par \par Language: Follows all commands. Naming, repetition and comprehension intact. Attention/concentration intact. No dysarthria, no aphasia. Fund of knowledge appropriate. \par \par Cranial nerves: Pupils equally round and reactive to light, Left eye lateral inferior quadrant defect. No nystagmus, EOMI, face symmetric, hearing intact bilaterally, palate elevation symmetric, tongue was midline, sternocleidomastoid/ shoulder shrug strength bilaterally 5/5. \par \par Motor: No drifting in all extremities. Normal bulk and tone, strength 5/5 in bilateral upper and lower extremities.  strength 5/5. \par \par Sensation: Intact to light touch. No neglect. \par \par Coordination: No dysmetria on finger-to-nose and heel-to-shin. \par \par Gait: Steady.

## 2022-11-01 NOTE — ASSESSMENT
[FreeTextEntry1] : Patient is 30 yo RH woman, never smoker, with FMHx of Hemophilia A and PMHx of ophthalmic migraines, pre-eclampsia (normotensive afterwards w/o meds) and PCOS with finding of R occipital IPH found during work up for severe unusual HA in 4/2022. She has 2 diagnostic cerebral angiograms with Dr. Castro, one on 4/28/22 and one on 6/16/22. The repeat angiogram in 6/2022 showed small early shunting of the right parieto-occipital artery which seemed WORSE in comparison to the previous cerebral DSA. 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 Gonzalez on 8/29/22 for which she presents today in our [facility] for f/u. She was cleared by Dr. Galindo in follow up in 9/2022 to return to her previous quality of life.  Today, she presents with mom and daughter and denies any new neurological complaints. \par \par PLAN:\par -F/u with Dr. Gonzalez/Dr. Galindo s/p AVF embolization and NEUROVASCULAR management \par -Instructed patient to call 911 or report to ED if any acute neurological changes occur\par -WOULD SIGN OFF.\par \par \par \par \par

## 2022-11-01 NOTE — HISTORY OF PRESENT ILLNESS
[FreeTextEntry1] : Patient is 32 yo RH woman, never smoker, with FMHx of Hemophilia A and PMHx of ophthalmic migraines, pre-eclampsia (normotensive afterwards w/o meds) and PCOS with finding of R occipital IPH found during work up for severe unusual HA in 4/2022. She has 2 diagnostic cerebral angiograms with Dr. Castro, one on 4/28/22 and one on 6/16/22. The repeat angiogram in 6/2022 showed small early shunting of the right parieto-occipital artery which seemed WORSE in comparison to the previous cerebral DSA. 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 Gonzalez on 8/29/22 for which she presents today in our [facility] for f/u. She was cleared by Dr. Galindo in follow up in 9/2022 to return to her previous quality of life.  \par \par Today, she presents with mom and daughter and denies any new neurological complaints. She continues to have L eye only inferior lateral quadrant visual deficit. Says she was told six mo from her AVF embolization, they would recommend she get repeat surveillance diagnostic cerebral angiogram.

## 2022-11-01 NOTE — REASON FOR VISIT
[Follow-Up: _____] : a [unfilled] follow-up visit [Family Member] : family member [FreeTextEntry1] : intracranial AVM for follow up

## 2022-11-16 ENCOUNTER — RX RENEWAL (OUTPATIENT)
Age: 32
End: 2022-11-16

## 2022-11-16 RX ORDER — NORTRIPTYLINE HYDROCHLORIDE 10 MG/1
10 CAPSULE ORAL
Qty: 60 | Refills: 1 | Status: DISCONTINUED | COMMUNITY
Start: 1900-01-01 | End: 2022-11-16

## 2022-11-17 NOTE — OB RN TRIAGE NOTE - NS_FETALMOVEMENT_OBGYN_ALL_OB
[FreeTextEntry1] : CHERI NGUYEN is a 59 year old male family history of prostate and kidney cancer previously with elevated PSA in the setting of prostatitis who presents for evaluation of elevated PSA referred by Dr Hastings, also with adrenal adenoma, questionable renal lesions confirmed to be negative on dedicated MRI imaging of the kidneys 2021, found to have scarring/lobulated kidneys.\par \par Doing well denies LUTS.He denies gross hematuria, dysuria or associated symptoms. \par \par PSA 11/03/22 - 4.19 % free 17 \par \par MRI abdomen 05/11/22 images - both kidney lobulated in contour , possibly representing normal variation versus multifocal cortical scarring , limiting evaluation for exophytic lesions , but so suspicious mass identified. Few tiny subcentimeter scattered bilateral renal cysts . 1.8 cm right adrenal adenoma  \par \par  PMH - HX of pyelonephritis as a young adult\par Family History: of kidney cancer and prostate cancer \par Social History:  , from [facility]\par \par From  assessment 05/11/22  - PSA is up now close to 4 while a year and a half ago it was in the mid twos. He had a bout of prostatitis which reportedly is gone, there were no expressed prostatic secretions, his prostate on JAY felt quite small. Additionally, his kidneys were cleared by an MRI Instead of getting a 4K which we have gotten away from we could consider an MRI of the prostate no given his current stent him not sure what would be able to do as far as treatment and biopsy.\par \par 
Present, unchanged

## 2022-12-20 ENCOUNTER — APPOINTMENT (OUTPATIENT)
Dept: NEUROLOGY | Facility: CLINIC | Age: 32
End: 2022-12-20

## 2022-12-20 VITALS
DIASTOLIC BLOOD PRESSURE: 83 MMHG | HEIGHT: 62 IN | WEIGHT: 190 LBS | HEART RATE: 80 BPM | BODY MASS INDEX: 34.96 KG/M2 | SYSTOLIC BLOOD PRESSURE: 133 MMHG

## 2022-12-20 DIAGNOSIS — I61.9 NONTRAUMATIC INTRACEREBRAL HEMORRHAGE, UNSPECIFIED: ICD-10-CM

## 2022-12-20 PROCEDURE — 99215 OFFICE O/P EST HI 40 MIN: CPT

## 2022-12-20 RX ORDER — METRONIDAZOLE 500 MG/1
500 TABLET ORAL TWICE DAILY
Qty: 14 | Refills: 0 | Status: DISCONTINUED | COMMUNITY
Start: 2022-09-23 | End: 2022-12-20

## 2022-12-20 RX ORDER — CLOTRIMAZOLE AND BETAMETHASONE DIPROPIONATE 10; .5 MG/G; MG/G
1-0.05 CREAM TOPICAL TWICE DAILY
Qty: 1 | Refills: 0 | Status: DISCONTINUED | COMMUNITY
Start: 2022-09-22 | End: 2022-12-20

## 2022-12-20 RX ORDER — FLUCONAZOLE 150 MG/1
150 TABLET ORAL
Qty: 1 | Refills: 0 | Status: DISCONTINUED | COMMUNITY
Start: 2022-03-25 | End: 2022-12-20

## 2022-12-20 RX ORDER — TERCONAZOLE 4 MG/G
0.4 CREAM VAGINAL
Qty: 1 | Refills: 0 | Status: DISCONTINUED | COMMUNITY
Start: 2022-09-22 | End: 2022-12-20

## 2022-12-20 RX ORDER — TERCONAZOLE 4 MG/G
0.4 CREAM VAGINAL
Qty: 1 | Refills: 1 | Status: DISCONTINUED | COMMUNITY
Start: 2021-01-28 | End: 2022-12-20

## 2022-12-20 RX ORDER — PROGESTERONE 100 MG/1
100 CAPSULE ORAL
Refills: 0 | Status: DISCONTINUED | COMMUNITY
End: 2022-12-20

## 2022-12-20 RX ORDER — FLUCONAZOLE 150 MG/1
150 TABLET ORAL
Qty: 1 | Refills: 1 | Status: DISCONTINUED | COMMUNITY
Start: 2022-10-18 | End: 2022-12-20

## 2022-12-20 RX ORDER — FLUCONAZOLE 150 MG/1
150 TABLET ORAL
Qty: 1 | Refills: 0 | Status: DISCONTINUED | COMMUNITY
Start: 2020-04-28 | End: 2022-12-20

## 2022-12-20 NOTE — ASSESSMENT
[FreeTextEntry1] : Patient is 33 yo RH woman, never smoker, with FMHx of Hemophilia A and PMHx of ophthalmic migraines, pre-eclampsia (normotensive afterwards w/o meds) and PCOS with finding of R occipital IPH found during work up for severe unusual HA in 4/2022. She has 2 diagnostic cerebral angiograms with Dr. Castro, one on 4/28/22 and one on 6/16/22. The repeat angiogram in 6/2022 showed small early shunting of the right parieto-occipital artery which seemed WORSE in comparison to the previous cerebral DSA. 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 Gonzalez on 8/29/22 for which she presents today in our [facility] for f/u. She was cleared by Dr. Galindo in follow up in 9/2022 to return to her previous quality of life.  Today, she presents with mom and daughter and continues to have positive visual symptoms almost daily.  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  Will confirm whether there are any abnormalities on aEEG prior to increasing depakote or switching to topamax.\par \par PLAN:\par -F/u with Dr. Gonzalez/Dr. Galindo s/p AVF embolization and NEUROVASCULAR management \par -48 hour aEEG\par -continue current meds\par -Nurtec ODT PRN migraines\par -f/u in 3 months\par \par \par \par \par \par

## 2022-12-20 NOTE — PHYSICAL EXAM
[FreeTextEntry1] : NIH STROKE SCALE \par \par Item	 Score \par \par 1 a.	Level of Consciousness	 0 \par 1 b. LOC Questions	 0 \par 1 c.	LOC Commands	 0 \par 2.	Best Gaze	 0 \par 3.	Visual	 1\par 4.	Facial Palsy 0 \par 5 a.	Motor Arm - Left	 0 \par 5 b.	Motor Arm - Right	 0 \par 6 a.	Motor Leg - Left	 0 \par 6 b.	Motor Leg - Right	 0 \par 7.	Limb Ataxia	 0 \par 8.	Sensory	 0 \par 9.	Language	 0 \par 10.	Dysarthria	 0 \par 11.	Extinction and Inattention 	 0 \par __________________________________________ \par \par TOTAL	 1 \par \par mRS: 0 No symptoms at all \par \par \par Neurologic Exam: \par \par Mental status: Awake, alert and oriented x 4. Recent and remote memory intact. \par \par Language: Follows all commands. Naming, repetition and comprehension intact. Attention/concentration intact. No dysarthria, no aphasia. Fund of knowledge appropriate. \par \par Cranial nerves: Pupils equally round and reactive to light, Left homonomous inferior quadrant defect. No nystagmus, EOMI, face symmetric, hearing intact bilaterally, palate elevation symmetric, tongue was midline, sternocleidomastoid/ shoulder shrug strength bilaterally 5/5. \par \par Motor: No drifting in all extremities. Normal bulk and tone, strength 5/5 in bilateral upper and lower extremities.  strength 5/5. \par \par Sensation: Intact to light touch. No neglect. \par \par Coordination: No dysmetria on finger-to-nose and heel-to-shin. \par \par Gait: Steady.

## 2022-12-20 NOTE — HISTORY OF PRESENT ILLNESS
[FreeTextEntry1] : Patient is 33 yo RH woman, never smoker, with FMHx of Hemophilia A and PMHx of ophthalmic migraines, pre-eclampsia (normotensive afterwards w/o meds) and PCOS with finding of R occipital IPH found during work up for severe unusual HA in 4/2022. She has 2 diagnostic cerebral angiograms with Dr. Castro, one on 4/28/22 and one on 6/16/22. The repeat angiogram in 6/2022 showed small early shunting of the right parieto-occipital artery which seemed WORSE in comparison to the previous cerebral DSA. 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 Gonzalez on 8/29/22 for which she presents today in our [facility] for f/u. She was cleared by Dr. Galindo in follow up in 9/2022 to return to her previous quality of life.  \par \par Today, she presents with mom and daughter and denies any new neurological complaints. She continues to have L eye only inferior lateral quadrant visual deficit. Says she was told six mo from her AVF embolization, they would recommend she get repeat surveillance diagnostic cerebral angiogram. \par She is getting episodes of colors flashing in her visual field lasting for a few minutes sometimes daily or multiple times in a day.  Following these episodes she believes her visual field problem is a little worse.

## 2023-01-07 ENCOUNTER — EMERGENCY (EMERGENCY)
Facility: HOSPITAL | Age: 33
LOS: 0 days | Discharge: LEFT BEFORE DISCHARGE | End: 2023-01-08
Attending: EMERGENCY MEDICINE | Admitting: EMERGENCY MEDICINE
Payer: MEDICAID

## 2023-01-07 VITALS
OXYGEN SATURATION: 97 % | TEMPERATURE: 96 F | DIASTOLIC BLOOD PRESSURE: 65 MMHG | SYSTOLIC BLOOD PRESSURE: 144 MMHG | RESPIRATION RATE: 17 BRPM | HEART RATE: 107 BPM

## 2023-01-07 DIAGNOSIS — R51.9 HEADACHE, UNSPECIFIED: ICD-10-CM

## 2023-01-07 DIAGNOSIS — Z98.890 OTHER SPECIFIED POSTPROCEDURAL STATES: Chronic | ICD-10-CM

## 2023-01-07 DIAGNOSIS — Z53.21 PROCEDURE AND TREATMENT NOT CARRIED OUT DUE TO PATIENT LEAVING PRIOR TO BEING SEEN BY HEALTH CARE PROVIDER: ICD-10-CM

## 2023-01-07 PROCEDURE — L9991: CPT

## 2023-01-13 ENCOUNTER — NON-APPOINTMENT (OUTPATIENT)
Age: 33
End: 2023-01-13

## 2023-01-16 ENCOUNTER — EMERGENCY (EMERGENCY)
Facility: HOSPITAL | Age: 33
LOS: 0 days | Discharge: HOME | End: 2023-01-16
Attending: EMERGENCY MEDICINE | Admitting: EMERGENCY MEDICINE
Payer: MEDICAID

## 2023-01-16 VITALS
OXYGEN SATURATION: 98 % | DIASTOLIC BLOOD PRESSURE: 72 MMHG | SYSTOLIC BLOOD PRESSURE: 147 MMHG | RESPIRATION RATE: 20 BRPM | HEART RATE: 91 BPM | TEMPERATURE: 98 F

## 2023-01-16 DIAGNOSIS — Z86.79 PERSONAL HISTORY OF OTHER DISEASES OF THE CIRCULATORY SYSTEM: ICD-10-CM

## 2023-01-16 DIAGNOSIS — H53.8 OTHER VISUAL DISTURBANCES: ICD-10-CM

## 2023-01-16 DIAGNOSIS — R51.9 HEADACHE, UNSPECIFIED: ICD-10-CM

## 2023-01-16 DIAGNOSIS — Z87.820 PERSONAL HISTORY OF TRAUMATIC BRAIN INJURY: ICD-10-CM

## 2023-01-16 DIAGNOSIS — Z98.890 OTHER SPECIFIED POSTPROCEDURAL STATES: Chronic | ICD-10-CM

## 2023-01-16 LAB
ALBUMIN SERPL ELPH-MCNC: 4.4 G/DL — SIGNIFICANT CHANGE UP (ref 3.5–5.2)
ALP SERPL-CCNC: 96 U/L — SIGNIFICANT CHANGE UP (ref 30–115)
ALT FLD-CCNC: 19 U/L — SIGNIFICANT CHANGE UP (ref 0–41)
ANION GAP SERPL CALC-SCNC: 7 MMOL/L — SIGNIFICANT CHANGE UP (ref 7–14)
AST SERPL-CCNC: 15 U/L — SIGNIFICANT CHANGE UP (ref 0–41)
BASOPHILS # BLD AUTO: 0.07 K/UL — SIGNIFICANT CHANGE UP (ref 0–0.2)
BASOPHILS NFR BLD AUTO: 0.8 % — SIGNIFICANT CHANGE UP (ref 0–1)
BILIRUB SERPL-MCNC: 0.2 MG/DL — SIGNIFICANT CHANGE UP (ref 0.2–1.2)
BUN SERPL-MCNC: 14 MG/DL — SIGNIFICANT CHANGE UP (ref 10–20)
CALCIUM SERPL-MCNC: 9.7 MG/DL — SIGNIFICANT CHANGE UP (ref 8.4–10.4)
CHLORIDE SERPL-SCNC: 104 MMOL/L — SIGNIFICANT CHANGE UP (ref 98–110)
CO2 SERPL-SCNC: 29 MMOL/L — SIGNIFICANT CHANGE UP (ref 17–32)
CREAT SERPL-MCNC: 0.7 MG/DL — SIGNIFICANT CHANGE UP (ref 0.7–1.5)
EGFR: 116 ML/MIN/1.73M2 — SIGNIFICANT CHANGE UP
EOSINOPHIL # BLD AUTO: 0.19 K/UL — SIGNIFICANT CHANGE UP (ref 0–0.7)
EOSINOPHIL NFR BLD AUTO: 2.1 % — SIGNIFICANT CHANGE UP (ref 0–8)
GLUCOSE SERPL-MCNC: 97 MG/DL — SIGNIFICANT CHANGE UP (ref 70–99)
HCG SERPL QL: NEGATIVE — SIGNIFICANT CHANGE UP
HCT VFR BLD CALC: 35.9 % — LOW (ref 37–47)
HGB BLD-MCNC: 12 G/DL — SIGNIFICANT CHANGE UP (ref 12–16)
IMM GRANULOCYTES NFR BLD AUTO: 0.3 % — SIGNIFICANT CHANGE UP (ref 0.1–0.3)
LYMPHOCYTES # BLD AUTO: 1.91 K/UL — SIGNIFICANT CHANGE UP (ref 1.2–3.4)
LYMPHOCYTES # BLD AUTO: 20.6 % — SIGNIFICANT CHANGE UP (ref 20.5–51.1)
MCHC RBC-ENTMCNC: 26.1 PG — LOW (ref 27–31)
MCHC RBC-ENTMCNC: 33.4 G/DL — SIGNIFICANT CHANGE UP (ref 32–37)
MCV RBC AUTO: 78 FL — LOW (ref 81–99)
MONOCYTES # BLD AUTO: 0.54 K/UL — SIGNIFICANT CHANGE UP (ref 0.1–0.6)
MONOCYTES NFR BLD AUTO: 5.8 % — SIGNIFICANT CHANGE UP (ref 1.7–9.3)
NEUTROPHILS # BLD AUTO: 6.51 K/UL — HIGH (ref 1.4–6.5)
NEUTROPHILS NFR BLD AUTO: 70.4 % — SIGNIFICANT CHANGE UP (ref 42.2–75.2)
NRBC # BLD: 0 /100 WBCS — SIGNIFICANT CHANGE UP (ref 0–0)
PLATELET # BLD AUTO: 433 K/UL — HIGH (ref 130–400)
POTASSIUM SERPL-MCNC: 4.6 MMOL/L — SIGNIFICANT CHANGE UP (ref 3.5–5)
POTASSIUM SERPL-SCNC: 4.6 MMOL/L — SIGNIFICANT CHANGE UP (ref 3.5–5)
PROT SERPL-MCNC: 6.9 G/DL — SIGNIFICANT CHANGE UP (ref 6–8)
RBC # BLD: 4.6 M/UL — SIGNIFICANT CHANGE UP (ref 4.2–5.4)
RBC # FLD: 14.6 % — HIGH (ref 11.5–14.5)
SODIUM SERPL-SCNC: 140 MMOL/L — SIGNIFICANT CHANGE UP (ref 135–146)
VALPROATE SERPL-MCNC: 13 UG/ML — LOW (ref 50–100)
WBC # BLD: 9.25 K/UL — SIGNIFICANT CHANGE UP (ref 4.8–10.8)
WBC # FLD AUTO: 9.25 K/UL — SIGNIFICANT CHANGE UP (ref 4.8–10.8)

## 2023-01-16 PROCEDURE — 99284 EMERGENCY DEPT VISIT MOD MDM: CPT

## 2023-01-16 PROCEDURE — 70450 CT HEAD/BRAIN W/O DYE: CPT | Mod: 26,MA

## 2023-01-16 RX ORDER — SODIUM CHLORIDE 9 MG/ML
1000 INJECTION INTRAMUSCULAR; INTRAVENOUS; SUBCUTANEOUS ONCE
Refills: 0 | Status: COMPLETED | OUTPATIENT
Start: 2023-01-16 | End: 2023-01-16

## 2023-01-16 RX ADMIN — SODIUM CHLORIDE 1000 MILLILITER(S): 9 INJECTION INTRAMUSCULAR; INTRAVENOUS; SUBCUTANEOUS at 13:57

## 2023-01-16 NOTE — ED PROVIDER NOTE - CLINICAL SUMMARY MEDICAL DECISION MAKING FREE TEXT BOX
Known history of occipital hemorrhage status post embolization for AVM followed by neurology.  On Depakote now with worsening flashing lights.  This is chronic for patient but getting worse.  Feels upset about it.  No vomiting no headache.  Wants to make sure everything is okay.  Could not reach to her neurologist.  Of note patient is scheduled to have EEG in 4 days    Exam as noted.  No focal neurodeficits.  GCS 15.  Ambulatory without difficulty.  Equal strength bilaterally.    Labs and imaging reviewed by me.  Outpatient follow-up ensured.  Communicated with patient's neurologist over the phone.

## 2023-01-16 NOTE — ED PROVIDER NOTE - PHYSICAL EXAMINATION
CONSTITUTIONAL: Well-appearing; well-nourished; in no apparent distress.   EYES: PERRL; EOM intact. No nystagmus  ENT: normal nose; no rhinorrhea; normal pharynx with no tonsillar hypertrophy.   NECK: Supple; non-tender; no cervical lymphadenopathy.  CARDIOVASCULAR: Normal S1, S2; no murmurs, rubs, or gallops.   RESPIRATORY: Normal chest excursion with respiration; breath sounds clear and equal bilaterally; no wheezes, rhonchi, or rales.  GI/: Normal bowel sounds; non-distended; non-tender; no palpable organomegaly.   MS: No evidence of trauma or deformity. Normal ROM in all four extremities; non-tender to palpation; distal pulses are normal.   SKIN: Normal for age and race; warm; dry; good turgor; no apparent lesions or exudate.   NEURO/PSYCH: A & O x 4; grossly unremarkable. mood and manner are appropriate. Grooming and personal hygiene are appropriate. no focal deficits. No facial droop. No tongue deviation. Cerebellar intact. normal gait. Sensation intact CONSTITUTIONAL: Well-appearing; well-nourished; in no apparent distress.   EYES: PERRL; EOM intact. No nystagmus. VA 20/20 in b/l eyes without corrective lenses  ENT: normal nose; no rhinorrhea; normal pharynx with no tonsillar hypertrophy.   NECK: Supple; non-tender; no cervical lymphadenopathy.  CARDIOVASCULAR: Normal S1, S2; no murmurs, rubs, or gallops.   RESPIRATORY: Normal chest excursion with respiration; breath sounds clear and equal bilaterally; no wheezes, rhonchi, or rales.  GI/: Normal bowel sounds; non-distended; non-tender; no palpable organomegaly.   MS: No evidence of trauma or deformity. Normal ROM in all four extremities; non-tender to palpation; distal pulses are normal.   SKIN: Normal for age and race; warm; dry; good turgor; no apparent lesions or exudate.   NEURO/PSYCH: A & O x 4; grossly unremarkable. mood and manner are appropriate. Grooming and personal hygiene are appropriate. no focal deficits. No facial droop. No tongue deviation. Cerebellar intact. normal gait. Sensation intact

## 2023-01-16 NOTE — ED PROVIDER NOTE - PATIENT PORTAL LINK FT
You can access the FollowMyHealth Patient Portal offered by [facility] by registering at the following website: [email or web address]. By joining [facility] FollowMyHealth portal, you will also be able to view your health information using other applications (apps) compatible with our system.

## 2023-01-16 NOTE — ED ADULT NURSE NOTE - NSIMPLEMENTINTERV_GEN_ALL_ED
Patient is here to discuss his recent blood work all of which was quite good. The exception would be A1c level just over 6 and by review of his other lab tests this gentleman does have impaired glucose tolerance. It is asymptomatic.   He is a recipient of Implemented All Fall Risk Interventions:  [place] to call system. Call bell, personal items and telephone within reach. Instruct patient to call for assistance. Room bathroom lighting operational. Non-slip footwear when patient is off stretcher. Physically safe environment: no spills, clutter or unnecessary equipment. Stretcher in lowest position, wheels locked, appropriate side rails in place. Provide visual cue, wrist band, yellow gown, etc. Monitor gait and stability. Monitor for mental status changes and reorient to person, place, and time. Review medications for side effects contributing to fall risk. Reinforce activity limits and safety measures with patient and family.

## 2023-01-16 NOTE — ED PROVIDER NOTE - OBJECTIVE STATEMENT
32 year old F with hx of rt occipital IP 04/22, opthalmic migraines, cerebral angiogram and embolization of pial AV with Dr. Gonzalez at [facility] 08/22 presenting to er c/o worsening b/l flashing lights. Pt sts sees intermittent flashing lights since she was dx with IPH. Sts since Saturday has had worsening/ constant b/l flashing L> R x past 2 days. + mild HA. Pt follows with Dr. Diaz . Pt denies any blurry vision, neck pain/stiffness, fever/chills, dizziness, speech changes, extremity weakness, chest pain, sob, difficulty ambulating.

## 2023-01-16 NOTE — ED PROVIDER NOTE - PROGRESS NOTE DETAILS
SS: discussed case with neuro Dr. Dooley: pt instructed to increased depakote to 250 BID, scheduled for eeg in 4days. Strict return precautions given.

## 2023-01-16 NOTE — ED ADULT NURSE NOTE - OBJECTIVE STATEMENT
Patient states she has been seeing flashes of light x 3 days, +dizziness. pt is able to dial and use cell phone. pt complaining about being in hallway and being short with answers. pt threw cell phone. pt cell phone retrieved by rn and handed cell phone back.

## 2023-01-16 NOTE — ED PROVIDER NOTE - CARE PROVIDER_API CALL
Shoaib Garcia)  EEGEpilepsy; Neurology  [address] [place] [address], [address]  [place] [address]  Phone: [phone]  Fax: [phone]  Follow Up Time:

## 2023-01-16 NOTE — ED PROVIDER NOTE - ATTENDING APP SHARED VISIT CONTRIBUTION OF CARE
Known history of occipital hemorrhage status post embolization for AVM followed by neurology.  On Depakote now with worsening flashing lights.  This is chronic for patient but getting worse.  Feels upset about it.  No vomiting no headache.  Wants to make sure everything is okay.  Could not reach to her neurologist.  Of note patient is scheduled to have EEG in 4 days    Exam as noted.  No focal neurodeficits.  GCS 15.  Ambulatory without difficulty.  Equal strength bilaterally.

## 2023-01-16 NOTE — ED PROVIDER NOTE - NS ED ATTENDING STATEMENT MOD
This was a shared visit with the GRAYSON. I reviewed and verified the documentation and independently performed the documented:

## 2023-01-16 NOTE — ED PROVIDER NOTE - NSFOLLOWUPINSTRUCTIONS_ED_ALL_ED_FT
Visual Disturbances    You have had a disturbance in your vision. This may be caused by various conditions, such as:    Migraines. Migraine headaches are often preceded by a disturbance in vision. Blind spots or light flashes are followed by a headache. This type of visual disturbance is temporary. It does not damage the eye.  Glaucoma. This is caused by increased pressure in the eye. Symptoms include haziness, blurred vision, or seeing rainbow colored circles when looking at bright lights. Partial or complete visual loss can occur. You may or may not experience eye pain. Visual loss may be gradual or sudden and is irreversible. Glaucoma is the leading cause of blindness.  Retina problems. Vision will be reduced if the retina becomes detached or if there is a circulation problem as with diabetes, high blood pressure, or a mini-stroke. Symptoms include seeing "floaters," flashes of light, or shadows, as if a curtain has fallen over your eye.  Optic nerve problems. The main nerve in your eye can be damaged by redness, soreness, and swelling (inflammation), poor circulation, drugs, and toxins.    It is very important to have a complete exam done by a specialist to determine the exact cause of your eye problem. The specialist may recommend medicines or surgery, depending on the cause of the problem. This can help prevent further loss of vision or reduce the risk of having a stroke. Contact the caregiver to whom you have been referred and arrange for follow-up care right away.    SEEK IMMEDIATE MEDICAL CARE IF:  Your vision gets worse.  You develop severe headaches.  You have any weakness or numbness in the face, arms, or legs.  You have any trouble speaking or walking.    ADDITIONAL NOTES AND INSTRUCTIONS    Please follow up with your Primary MD in 24-48 hr.  Seek immediate medical care for any new/worsening signs or symptoms.

## 2023-01-18 ENCOUNTER — APPOINTMENT (OUTPATIENT)
Dept: NEUROLOGY | Facility: CLINIC | Age: 33
End: 2023-01-18
Payer: MEDICAID

## 2023-01-18 ENCOUNTER — APPOINTMENT (OUTPATIENT)
Dept: NEUROSURGERY | Facility: CLINIC | Age: 33
End: 2023-01-18
Payer: MEDICAID

## 2023-01-18 PROCEDURE — 99214 OFFICE O/P EST MOD 30 MIN: CPT

## 2023-01-18 PROCEDURE — 95708 EEG WO VID EA 12-26HR UNMNTR: CPT

## 2023-01-18 PROCEDURE — 95721 EEG PHY/QHP>36<60 HR W/O VID: CPT

## 2023-01-18 NOTE — PHYSICAL EXAM
[General Appearance - Alert] : alert [General Appearance - In No Acute Distress] : in no acute distress [Oriented To Time, Place, And Person] : oriented to person, place, and time [Person] : oriented to person [Place] : oriented to place [Time] : oriented to time [Short Term Intact] : short term memory intact [Abnormal Walk] : normal gait

## 2023-01-19 ENCOUNTER — OUTPATIENT (OUTPATIENT)
Dept: OUTPATIENT SERVICES | Facility: HOSPITAL | Age: 33
LOS: 1 days | Discharge: HOME | End: 2023-01-19
Payer: MEDICAID

## 2023-01-19 VITALS
DIASTOLIC BLOOD PRESSURE: 60 MMHG | HEIGHT: 61 IN | OXYGEN SATURATION: 100 % | RESPIRATION RATE: 18 BRPM | SYSTOLIC BLOOD PRESSURE: 113 MMHG | HEART RATE: 77 BPM | TEMPERATURE: 98 F | WEIGHT: 211.64 LBS

## 2023-01-19 DIAGNOSIS — I67.1 CEREBRAL ANEURYSM, NONRUPTURED: ICD-10-CM

## 2023-01-19 DIAGNOSIS — Z01.818 ENCOUNTER FOR OTHER PREPROCEDURAL EXAMINATION: ICD-10-CM

## 2023-01-19 DIAGNOSIS — Z98.890 OTHER SPECIFIED POSTPROCEDURAL STATES: Chronic | ICD-10-CM

## 2023-01-19 LAB
ALBUMIN SERPL ELPH-MCNC: 4.5 G/DL — SIGNIFICANT CHANGE UP (ref 3.5–5.2)
ALP SERPL-CCNC: 106 U/L — SIGNIFICANT CHANGE UP (ref 30–115)
ALT FLD-CCNC: 20 U/L — SIGNIFICANT CHANGE UP (ref 0–41)
ANION GAP SERPL CALC-SCNC: 14 MMOL/L — SIGNIFICANT CHANGE UP (ref 7–14)
APTT BLD: 30.7 SEC — SIGNIFICANT CHANGE UP (ref 27–39.2)
AST SERPL-CCNC: 17 U/L — SIGNIFICANT CHANGE UP (ref 0–41)
BASOPHILS # BLD AUTO: 0.06 K/UL — SIGNIFICANT CHANGE UP (ref 0–0.2)
BASOPHILS NFR BLD AUTO: 0.5 % — SIGNIFICANT CHANGE UP (ref 0–1)
BILIRUB SERPL-MCNC: <0.2 MG/DL — SIGNIFICANT CHANGE UP (ref 0.2–1.2)
BUN SERPL-MCNC: 12 MG/DL — SIGNIFICANT CHANGE UP (ref 10–20)
CALCIUM SERPL-MCNC: 10.1 MG/DL — SIGNIFICANT CHANGE UP (ref 8.4–10.5)
CHLORIDE SERPL-SCNC: 100 MMOL/L — SIGNIFICANT CHANGE UP (ref 98–110)
CO2 SERPL-SCNC: 28 MMOL/L — SIGNIFICANT CHANGE UP (ref 17–32)
CREAT SERPL-MCNC: 0.7 MG/DL — SIGNIFICANT CHANGE UP (ref 0.7–1.5)
EGFR: 116 ML/MIN/1.73M2 — SIGNIFICANT CHANGE UP
EOSINOPHIL # BLD AUTO: 0.17 K/UL — SIGNIFICANT CHANGE UP (ref 0–0.7)
EOSINOPHIL NFR BLD AUTO: 1.5 % — SIGNIFICANT CHANGE UP (ref 0–8)
GLUCOSE SERPL-MCNC: 92 MG/DL — SIGNIFICANT CHANGE UP (ref 70–99)
HCT VFR BLD CALC: 40.2 % — SIGNIFICANT CHANGE UP (ref 37–47)
HGB BLD-MCNC: 13.4 G/DL — SIGNIFICANT CHANGE UP (ref 12–16)
IMM GRANULOCYTES NFR BLD AUTO: 0.5 % — HIGH (ref 0.1–0.3)
INR BLD: 1.01 RATIO — SIGNIFICANT CHANGE UP (ref 0.65–1.3)
LYMPHOCYTES # BLD AUTO: 1.9 K/UL — SIGNIFICANT CHANGE UP (ref 1.2–3.4)
LYMPHOCYTES # BLD AUTO: 16.5 % — LOW (ref 20.5–51.1)
MCHC RBC-ENTMCNC: 25.6 PG — LOW (ref 27–31)
MCHC RBC-ENTMCNC: 33.3 G/DL — SIGNIFICANT CHANGE UP (ref 32–37)
MCV RBC AUTO: 76.7 FL — LOW (ref 81–99)
MONOCYTES # BLD AUTO: 1.01 K/UL — HIGH (ref 0.1–0.6)
MONOCYTES NFR BLD AUTO: 8.8 % — SIGNIFICANT CHANGE UP (ref 1.7–9.3)
NEUTROPHILS # BLD AUTO: 8.32 K/UL — HIGH (ref 1.4–6.5)
NEUTROPHILS NFR BLD AUTO: 72.2 % — SIGNIFICANT CHANGE UP (ref 42.2–75.2)
NRBC # BLD: 0 /100 WBCS — SIGNIFICANT CHANGE UP (ref 0–0)
PLATELET # BLD AUTO: 516 K/UL — HIGH (ref 130–400)
POTASSIUM SERPL-MCNC: 5.1 MMOL/L — HIGH (ref 3.5–5)
POTASSIUM SERPL-SCNC: 5.1 MMOL/L — HIGH (ref 3.5–5)
PROT SERPL-MCNC: 7.3 G/DL — SIGNIFICANT CHANGE UP (ref 6–8)
PROTHROM AB SERPL-ACNC: 11.5 SEC — SIGNIFICANT CHANGE UP (ref 9.95–12.87)
RBC # BLD: 5.24 M/UL — SIGNIFICANT CHANGE UP (ref 4.2–5.4)
RBC # FLD: 14.6 % — HIGH (ref 11.5–14.5)
SODIUM SERPL-SCNC: 142 MMOL/L — SIGNIFICANT CHANGE UP (ref 135–146)
WBC # BLD: 11.52 K/UL — HIGH (ref 4.8–10.8)
WBC # FLD AUTO: 11.52 K/UL — HIGH (ref 4.8–10.8)

## 2023-01-19 PROCEDURE — 93010 ELECTROCARDIOGRAM REPORT: CPT

## 2023-01-19 PROCEDURE — 95708 EEG WO VID EA 12-26HR UNMNTR: CPT

## 2023-01-19 NOTE — H&P PST ADULT - HISTORY OF PRESENT ILLNESS
Patient is a 32 year old female presenting to PAST in preparation for  CEREBRAL ANGIOGRAM on 1/21 under lsb anesthesia by Dr. Borja.  Pt with h/o dural AVF had intracranial bleed in April 2022  she had a cerebral angio  that was inconclusive at that time. Reports that the bleed had subsided and she was released from the hospital on nortriptylline . In May 2022 she had visual disturbances and was taken to ED again, she had a head ct which revealed cerebral edema. An eeg was done and also mra and  she was started on anti sz meds.  Has never had sz but cont continues to have visual disturbances. Neuro requesting another cerebral angio  PATIENT CURRENTLY DENIES CHEST PAIN  SHORTNESS OF BREATH  PALPITATIONS,  DYSURIA, OR UPPER RESPIRATORY INFECTION IN PAST 2 WEEKS  EXERCISE  TOLERANCE  1-2 FLIGHT OF STAIRS  WITHOUT SHORTNESS OF BREATH    Anesthesia Alert  NO--Difficult Airway  NO--History of neck surgery or radiation  NO--Limited ROM of neck  NO--History of Malignant hyperthermia  NO--Personal or family history of Pseudocholinesterase deficiency  NO--Prior Anesthesia Complication  NO--Latex Allergy  NO--Loose teeth  NO--History of Rheumatoid Arthritis  NO--DANIEL  NO-- BLEEDING RISK  NO--Other_____    As per patient, this is their complete medical and surgical history, including medications both prescribed or over the counter.  Patient verbalized understanding of instructions and was given the opportunity to ask questions and have them answered.  Patient denies any signs or symptoms of COVID 19 and denies contact with known positive individuals.  They have an appointment for COVID testing pre-procedure and acknowledge its time and place.  They were instructed to quarantine pre-procedure, practice exposure control measures, continue to self-monitor and report any concerns to their proceduralist.

## 2023-01-19 NOTE — H&P PST ADULT - NSICDXPASTMEDICALHX_GEN_ALL_CORE_FT
PAST MEDICAL HISTORY:  Bicornuate uterus     Cerebrovascular dural AV fistula     Migraine headache     Obesity     PCOS (polycystic ovarian syndrome)     TIA (transient ischemic attack) [facility]-4/2022-inf lat visual field loss

## 2023-01-20 ENCOUNTER — APPOINTMENT (OUTPATIENT)
Dept: NEUROLOGY | Facility: CLINIC | Age: 33
End: 2023-01-20

## 2023-01-20 PROCEDURE — 95700 EEG CONT REC W/VID EEG TECH: CPT

## 2023-01-25 ENCOUNTER — APPOINTMENT (OUTPATIENT)
Dept: NEUROSURGERY | Facility: CLINIC | Age: 33
End: 2023-01-25
Payer: MEDICAID

## 2023-01-25 PROCEDURE — 99202 OFFICE O/P NEW SF 15 MIN: CPT | Mod: 95

## 2023-01-26 ENCOUNTER — APPOINTMENT (OUTPATIENT)
Dept: NEUROSURGERY | Facility: CLINIC | Age: 33
End: 2023-01-26
Payer: MEDICAID

## 2023-01-26 NOTE — ASSESSMENT
[FreeTextEntry1] : PLAN\par \par Over telehealth reviewed recent Head CT along with Dr Galindo and no bleed noted\par Continue Depakote 250mg bid as per Neurologist \par Recommend Cerebral Dx angiogram   (3/6/2023)\par F/U MRV/MRA /MRI when completed she will call the office once its completed\par Pt verbalized understanding\par \par

## 2023-01-26 NOTE — HISTORY OF PRESENT ILLNESS
[de-identified] : 32-year-old RH pleasant female, non-smoker, with a FMHx of Hemophilia A and PMHx of ophthalmic migraines, pre-eclampsia (normotensive afterwards without medications) and PCOS with finding of R occipital IPH found during work-up for a severe unusual headache in 4/2022. \par \par Ms. Benítez states that in April 2022 she experienced a headache of a quick and sudden onset to her right temporal lobe "that she never experienced before" that subsided as fast as it came on.  It was approximately 3am, and her usual treatment of headaches/migraines was to take Imitrex which she did.  She began to panic and went to [facility] when she had left sided peripheral vision loss. CT head demonstrated hemorrhage which warranted an immediate transfer to [facility] where she then went under the care of Dr. Castro. She then had two diagnostic cerebral angiograms with Dr. Castro, one on 4/28/22 and one on 6/16/22. The repeat angiogram in 6/2022 showed small early shunting of the right parieto-occipital artery which seemed worse when compared to the previous cerebral DSA. At  Conference, it was thought that the pial AV fistula could be an explanation for her IPH and she underwent uneventful and successful cerebral angiogram and embolization of pial AV fistula with Dr. Gonzalez on 8/29/22 for which she presents today in our [facility] for a follow-up. She was cleared by Dr. Galindo in 9/2022 to return to her previous quality of life.\par \par She presents today with complaints of flashing in her left eye since the second angiogram with colors of blue, white, red, yellow and sporadic double vision.  She states that the prior treating physicians were aware of this but did not seem concerned.  Patient has seen neuro-ophthalmology in May but no there was no significant finding. Ms. Benítez to the emergency department yesterday for ophthalmological complaints, CT head noncontrast done and outpatient 48 hour EEG planned for today with Dr. Hanna following pt and increased her Depakote 250mg bid since her level was 13   \par \par She remains on Depakote 250mg PO BID\par Endocrine: Dr. Yemi Garcia MD; patient educated to return for a consultation, for she mentioned during the last hospitalization she had a high glucose level\par \par Patient educated that the flashing could be indicative of seizure-like activity and that she should proceed with the plan delineated by Dr. Hanna with the EEG monitoring. \par \par Discussion of a diagnostic cerebral angiogram took place and the benefits of performing such procedure in order to obtain detailed, clear and accurate pictures of the blood vessels in the brain and to assess the prior embolization performed of the AVF, as it is approximately 6 months post-treatment. The risks of therapy were discussed including but not limited to death, stroke, bleeding, access site complications, vessel perforation and the need for surveillance. The patient wishes to proceed and we will schedule her accordingly. \par \par Diagnostic imaging in the form of MRI/MRA/MRV also ordered. \par \par Pt will follow up cerebral angiogram on 3/6/2023 and will need medical clearance\par \par No concerns were identified at this time and all questions were answered.  Patient was tearful and emotional support was rendered.

## 2023-01-31 ENCOUNTER — APPOINTMENT (OUTPATIENT)
Age: 33
End: 2023-01-31

## 2023-02-01 ENCOUNTER — NON-APPOINTMENT (OUTPATIENT)
Age: 33
End: 2023-02-01

## 2023-02-06 ENCOUNTER — OUTPATIENT (OUTPATIENT)
Dept: OUTPATIENT SERVICES | Facility: HOSPITAL | Age: 33
LOS: 1 days | End: 2023-02-06
Payer: MEDICAID

## 2023-02-06 ENCOUNTER — RESULT REVIEW (OUTPATIENT)
Age: 33
End: 2023-02-06

## 2023-02-06 DIAGNOSIS — Z98.890 OTHER SPECIFIED POSTPROCEDURAL STATES: Chronic | ICD-10-CM

## 2023-02-06 DIAGNOSIS — Z00.8 ENCOUNTER FOR OTHER GENERAL EXAMINATION: ICD-10-CM

## 2023-02-06 DIAGNOSIS — I77.0 ARTERIOVENOUS FISTULA, ACQUIRED: ICD-10-CM

## 2023-02-06 PROCEDURE — 70553 MRI BRAIN STEM W/O & W/DYE: CPT | Mod: 26

## 2023-02-06 PROCEDURE — 70546 MR ANGIOGRAPH HEAD W/O&W/DYE: CPT

## 2023-02-06 PROCEDURE — 70546 MR ANGIOGRAPH HEAD W/O&W/DYE: CPT | Mod: 26,59

## 2023-02-06 PROCEDURE — 70553 MRI BRAIN STEM W/O & W/DYE: CPT

## 2023-02-07 DIAGNOSIS — I77.0 ARTERIOVENOUS FISTULA, ACQUIRED: ICD-10-CM

## 2023-02-09 ENCOUNTER — NON-APPOINTMENT (OUTPATIENT)
Age: 33
End: 2023-02-09

## 2023-02-15 ENCOUNTER — APPOINTMENT (OUTPATIENT)
Dept: NEUROSURGERY | Facility: CLINIC | Age: 33
End: 2023-02-15
Payer: MEDICAID

## 2023-02-15 DIAGNOSIS — I67.9 CEREBROVASCULAR DISEASE, UNSPECIFIED: ICD-10-CM

## 2023-02-15 PROCEDURE — 99441: CPT

## 2023-02-15 NOTE — REASON FOR VISIT
[Home] : at home, [unfilled] , at the time of the visit. [Medical Office: ([facility])___] : at the medical office located in  [Verbal consent obtained from patient] : the patient, [unfilled] [Follow-Up: _____] : a [unfilled] follow-up visit

## 2023-02-15 NOTE — HISTORY OF PRESENT ILLNESS
[FreeTextEntry1] : Pleasant telehealth conversation took place today with 32-year-old female to follow-up on results of MRI, MRA, and MRV imaging performed on 2/6/2023. \par \par Ms. Benítez states that overall she has been doing well since the last office visit with no acute changes.  She recently had a consult with Dr. Hanna, neurology, increased her Depakote to 500 mg PO twice daily.  Since the change in dosage the patient feels as though the "strength" of the flashing lights in her eyes have lessened.  Patient has a chronic decrease in left eye peripheral visual field. \par \par We discussed at the last office visit performing a repeat diagnostic cerebral angiogram to better assess the vessels of the brain and the status of questionable AVM/pial fistula, the patient consented, however canceled twice. She states today that she has scheduled a follow-up diagnostic cerebral angiogram on 3/3/2023 with Dr. Galindo who she has received treatment by in the past after Dr. Castro at [facility]. \par \par I have encouraged her to follow this plan, for the imaging performed on 2/6/2023 demonstrated right occipital medial cortical signal abnormality, slightly more pronounced since 5/24/2022, which may reflect subacute edema/ischemia. An updated diagnostic cerebral angiogram would be the only way to provide a clear and accurate depiction.  She has agreed to proceed and denied any further concerns at this time.  I have welcomed Ms. Benítez to contact our office in the future should she need any further assistance.  She was grateful for the conversation. \par \par Unable to perform a physical examination due to the nature of the visit.\par No questions were left unanswered. \par Imaging was reviewed with Dr. Borja prior.

## 2023-02-21 ENCOUNTER — NON-APPOINTMENT (OUTPATIENT)
Age: 33
End: 2023-02-21

## 2023-02-22 LAB
ALBUMIN SERPL ELPH-MCNC: 3.9 G/DL
ALP BLD-CCNC: 82 U/L
ALT SERPL-CCNC: 20 U/L
ANION GAP SERPL CALC-SCNC: 12 MMOL/L
AST SERPL-CCNC: 15 U/L
BASOPHILS # BLD AUTO: 0.09 K/UL
BASOPHILS NFR BLD AUTO: 0.8 %
BILIRUB SERPL-MCNC: <0.2 MG/DL
BUN SERPL-MCNC: 16 MG/DL
CALCIUM SERPL-MCNC: 9.2 MG/DL
CHLORIDE SERPL-SCNC: 103 MMOL/L
CO2 SERPL-SCNC: 25 MMOL/L
CREAT SERPL-MCNC: 0.7 MG/DL
EGFR: 116 ML/MIN/1.73M2
EOSINOPHIL # BLD AUTO: 0.45 K/UL
EOSINOPHIL NFR BLD AUTO: 4.1 %
GLUCOSE SERPL-MCNC: 93 MG/DL
HCT VFR BLD CALC: 39.2 %
HGB BLD-MCNC: 12.1 G/DL
IMM GRANULOCYTES NFR BLD AUTO: 0.8 %
LYMPHOCYTES # BLD AUTO: 2.46 K/UL
LYMPHOCYTES NFR BLD AUTO: 22.7 %
MAGNESIUM SERPL-MCNC: 1.7 MG/DL
MAN DIFF?: NORMAL
MCHC RBC-ENTMCNC: 26 PG
MCHC RBC-ENTMCNC: 30.9 G/DL
MCV RBC AUTO: 84.1 FL
MONOCYTES # BLD AUTO: 0.93 K/UL
MONOCYTES NFR BLD AUTO: 8.6 %
NEUTROPHILS # BLD AUTO: 6.83 K/UL
NEUTROPHILS NFR BLD AUTO: 63 %
PLATELET # BLD AUTO: 416 K/UL
POTASSIUM SERPL-SCNC: 4.7 MMOL/L
PROT SERPL-MCNC: 6.4 G/DL
RBC # BLD: 4.66 M/UL
RBC # FLD: 15.3 %
SODIUM SERPL-SCNC: 140 MMOL/L
VALPROATE SERPL-MCNC: 53 UG/ML
WBC # FLD AUTO: 10.85 K/UL

## 2023-03-02 ENCOUNTER — APPOINTMENT (OUTPATIENT)
Dept: ENDOCRINOLOGY | Facility: CLINIC | Age: 33
End: 2023-03-02

## 2023-03-02 ENCOUNTER — TRANSCRIPTION ENCOUNTER (OUTPATIENT)
Age: 33
End: 2023-03-02

## 2023-03-03 ENCOUNTER — NON-APPOINTMENT (OUTPATIENT)
Age: 33
End: 2023-03-03

## 2023-03-03 ENCOUNTER — APPOINTMENT (OUTPATIENT)
Dept: NEUROLOGY | Facility: CLINIC | Age: 33
End: 2023-03-03
Payer: MEDICAID

## 2023-03-03 PROCEDURE — 99214 OFFICE O/P EST MOD 30 MIN: CPT

## 2023-03-03 NOTE — ASSESSMENT
[FreeTextEntry1] : Patient is 31 yo RH woman, never smoker, with FMHx of Hemophilia A and PMHx of ophthalmic migraines, pre-eclampsia (normotensive afterwards w/o meds) and PCOS with finding of R occipital IPH found during work up for severe unusual HA in 4/2022. She has 2 diagnostic cerebral angiograms with Dr. Castro, one on 4/28/22 and one on 6/16/22. The repeat angiogram in 6/2022 showed small early shunting of the right parieto-occipital artery which seemed WORSE in comparison to the previous cerebral DSA. 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 Gonzalez on 8/29/22 for which she presents today in our [facility] for f/u. She was cleared by Dr. Galindo in follow up in 9/2022 to return to her previous quality of life.  Today, she presents with mom and daughter and continues to have positive visual symptoms almost daily.  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  Will confirm whether there are any abnormalities on aEEG prior to increasing depakote or switching to topamax.\par \par PLAN:\par 1.Will continue depakote ER 500mg BID (however if episodes begin again would increase to 750mg BID)\par 2. f/u with DSA\par 3. Monitor BP at home\par 4. Ubrelvy PRN for migraines\par 5. f/u in 3-6 months\par \par \par \par \par

## 2023-03-03 NOTE — HISTORY OF PRESENT ILLNESS
[FreeTextEntry1] : Patient is 33 yo RH woman, never smoker, with FMHx of Hemophilia A and PMHx of ophthalmic migraines, pre-eclampsia (normotensive afterwards w/o meds) and PCOS with finding of R occipital IPH found during work up for severe unusual HA in 4/2022. She has 2 diagnostic cerebral angiograms with Dr. Castro, one on 4/28/22 and one on 6/16/22. The repeat angiogram in 6/2022 showed small early shunting of the right parieto-occipital artery which seemed WORSE in comparison to the previous cerebral DSA. 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 Gonzalez on 8/29/22 for which she presents today in our [facility] for f/u. She was cleared by Dr. Galindo in follow up in 9/2022 to return to her previous quality of life.  \par \par Today, she presents with mom and daughter and denies any seizures since Valentines day.  She was having very frequent visual auras suspicious for seizures.  I told her to increase her depakote however her episodes stopped prior to starting the higher dose.

## 2023-03-09 ENCOUNTER — APPOINTMENT (OUTPATIENT)
Dept: NEUROSURGERY | Facility: CLINIC | Age: 33
End: 2023-03-09
Payer: MEDICAID

## 2023-03-09 DIAGNOSIS — Z01.818 ENCOUNTER FOR OTHER PREPROCEDURAL EXAMINATION: ICD-10-CM

## 2023-03-09 PROCEDURE — 99205 OFFICE O/P NEW HI 60 MIN: CPT | Mod: 95

## 2023-03-14 PROBLEM — Z01.818 PREOP TESTING: Status: ACTIVE | Noted: 2023-03-14

## 2023-03-16 NOTE — HISTORY OF PRESENT ILLNESS
[de-identified] : The patient has given verbal consent for this telehealth visit using two-way audio and video technology.  The patient is currently located at home [address] [place] [address], and I am located at my office at [facility].\par \par ISREAL BENÍTEZ is a 32 year right-handed female with a PMH of HSV, Hemophilia A and ocular migraines, pre-eclampsia and PCOS, severe headache and discovery of right occipital IPH 4/2022 s/p embolization of pial AV fistula with Dr. 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 She presents today with complaints of flashing in her left eye since the second angiogram with colors of blue, white, red, yellow and sporadic double vision. Patient has seen neuro-ophthalmology in May but no there was no significant finding. Ms. Benítez to the emergency department 1/24/23 for ophthalmological complaints, CT head noncontrast done and outpatient 48 hour EEG planned for today with Dr. Hanna following pt and increased her Depakote. She was seen by Dr. Galindo's team who recommended a diagnostic cerebral angiogram.  \par \par She remains on Depakote 250mg PO BID Recent MRI/MRA/MRV 2/6/23 shows Right occipital lobe chronic hematoma cavity with increased right occipital medial cortical signal abnormality, MRA/V unremarkable. \par The patient denies headaches, visual change, numbness, weakness of extremities, speech disturbance, dizziness, neck pain, vertigo.\par Surg Hx: Salpingectomy Unilateral Right Side\par Meds: Depakote, Nurtec, Ubrelvy\par Allergies: NKDA\par Soc Hx: never smoker, no EtOH\par

## 2023-03-16 NOTE — ASSESSMENT
[FreeTextEntry1] : 32 year right-handed female with a PMH of HSV, Hemophilia A and ocular migraines, pre-eclampsia and PCOS, severe headache and discovery of right occipital IPH 4/2022 s/p embolization of pial AV fistula with Dr. Gonzalez in 8/2022, Partial Seizures on Depakote, who presents due to small pial fistula nearly at the surface of the right posterior parietal lobe fed from the distal right posterior cerebral artery with a single draining vein and left visual field deficit in the inferior quadrant and new onset of flashers in bilateral eyes. \par \par I have discussed the natural history and treatment options for pial av fistula with the patient. I explained the indications for observation and imaging surveillance, medical management, and surgery. I discussed the risks, benefits, possible complications and expected outcome related to each treatment option.\par \par I recommend a diagnostic cerebral angiogram to further evaluate the cerebral vasculature.  At the end of the discussion, the patient agreed to move forward.  My office will reach out to schedule in the coming weeks.  \par \par The patient understands the plan of care and is in agreement.  All questions answered to patient satisfaction.\par \par

## 2023-03-16 NOTE — DATA REVIEWED
[de-identified] : \par  MR Head w/wo IV Cont             Final\par \par No Documents Attached\par \par \par \par \par   ACC: [id number]     EXAM:  MR ANGIO BRAIN WAW IC   ORDERED BY: WENDY MARIN\par \par ACC: [id number]     EXAM:  MR VENOGRAM BRAIN WAW IC   ORDERED BY: WENDY MARIN\par \par ACC: [id number]     EXAM:  MR BRAIN WAW IC   ORDERED BY: WENDY MARIN\par \par PROCEDURE DATE:  02/06/2023\par \par \par \par INTERPRETATION:  Clinical History / Reason for exam: AVF, post embolization.\par \par Technique:\par MRI brain with and without contrast. Multiplanar multi sequential MRI of the brain was performed before and following intravenous contrast administration.\par \par MRV brain with and without contrast. MR venography of the brain was performed utilizing time-of-flight and postcontrast techniques.\par \par MRA brain with and without contrast. MR angiography of the brain was performed utilizing time-of-flight and postcontrast techniques.\par \par 10 cc of Gadavist were administered, 0 cc discarded.\par \par Comparison: CT head 1/16/2023, cerebral angiogram 6/16/2022, MRI brain 5/24/2022, MRI brain and MRA/MRV brain 4/27/2022.\par \par FINDINGS:\par \par MRI BRAIN:\par There is a chronic hematoma cavity within the right occipital lobe with gliosis and hemosiderin staining. There is T2/FLAIR signal hyperintensity along the right medial occipital cortex with DWI signal hyperintensity which is slightly more pronounced since the 5/24/2022 study and may reflect new edema/ischemia.\par \par The ventricles and cortical sulci are normal in size and configuration. There is no evidence of hydrocephalus. No new intracranial hemorrhage is demonstrated.\par \par There is no evidence of abnormal intracranial enhancement.\par \par The pituitary is normal in size. The cerebellar tonsils are normal in position. There is normal marrow signal within the clivus.\par \par MRA HEAD:\par The distal segments of the internal carotid arteries are unremarkable. The anterior and middle cerebral arteries are unremarkable.\par \par The distal segments of the vertebral arteries are unremarkable. The basilar artery and posterior cerebral arteries are unremarkable.\par \par MRV HEAD:\par The dural venous sinuses and deep venous structures are patent and demonstrate normal course and caliber.\par \par The jugular bulbs and proximal internal jugular veins are patent.\par \par \par IMPRESSION:\par \par MRI brain:\par 1.  Right occipital lobe chronic hematoma cavity.\par 2.  Right occipital medial cortical signal abnormality, slightly more pronounced since 5/24/2022 which may reflect subacute edema/ischemia.\par 3.  Otherwise unremarkable study.\par \par MRA head: Unremarkable.\par \par MRV head: Unremarkable.\par \par \par \par --- End of Report ---\par \par \par \par \par \par ELIDA RIZZO MD; Attending Radiologist\par This document has been electronically signed. Feb 7 2023 11:07AM\par \par  \par \par  Ordered by: WENDY MARIN       Collected/Examined: [id number] 02:36PM       \par Verified by: WENDY MARIN [id number] 04:09PM       \par  Result Communication: No patient communication needed at this time;\par Stage: Final       \par  Performed at: [facility]       Resulted: [id number] 10:33AM       Last Updated: [id number] 04:09PM       Accession: [id number]

## 2023-03-16 NOTE — PHYSICAL EXAM
[General Appearance - Alert] : alert [General Appearance - In No Acute Distress] : in no acute distress [FreeTextEntry5] : face grossly symmetrical [FreeTextEntry8] : Antigravity UE and LE

## 2023-04-04 NOTE — PATIENT PROFILE ADULT - HISTORY OF COVID-19 VACCINATION
----- Message from Concepcion De La Cruz MD sent at 4/4/2023 11:52 AM CDT -----  Regarding: RE: Jad Schmidt- please call pt and ask what questions does she have for me. I had gone over the labs with her after her visit.     Lilo- she needs urgent PA on injectafer and started. Her hb is <7. She is hesitant to get blood transfusions. Please assist. Thanks.   ----- Message -----  From: Roxana Grewal MA  Sent: 4/4/2023  10:13 AM CDT  To: Concepcion De La Cruz MD  Subject: FW: Labs                                         Would you like to speak with pt this week to discuss her labs ?   ----- Message -----  From: Roxana Grewal MA  Sent: 4/3/2023   5:08 PM CDT  To: Roxana Grewal MA  Subject: FW: Labs                                           ----- Message -----  From: Shanna Montes  Sent: 4/3/2023   4:52 PM CDT  To: Jono Javier Staff  Subject: Labs                                             Name of Who is Calling:  Patient          What is the request in detail:  Patients stated she would like to speak with Dr. De La Cruz about labs            Can the clinic reply by [facility]: Yes          [facility][phone]               No

## 2023-04-30 ENCOUNTER — EMERGENCY (EMERGENCY)
Facility: HOSPITAL | Age: 33
LOS: 0 days | Discharge: HOME | End: 2023-04-30
Attending: STUDENT IN AN ORGANIZED HEALTH CARE EDUCATION/TRAINING PROGRAM
Payer: MEDICAID

## 2023-04-30 VITALS
HEART RATE: 90 BPM | OXYGEN SATURATION: 99 % | RESPIRATION RATE: 16 BRPM | WEIGHT: 199.96 LBS | HEIGHT: 55 IN | SYSTOLIC BLOOD PRESSURE: 151 MMHG | TEMPERATURE: 98 F | DIASTOLIC BLOOD PRESSURE: 81 MMHG

## 2023-04-30 VITALS
OXYGEN SATURATION: 97 % | RESPIRATION RATE: 18 BRPM | HEART RATE: 82 BPM | SYSTOLIC BLOOD PRESSURE: 130 MMHG | TEMPERATURE: 98 F | DIASTOLIC BLOOD PRESSURE: 62 MMHG

## 2023-04-30 DIAGNOSIS — R11.0 NAUSEA: ICD-10-CM

## 2023-04-30 DIAGNOSIS — Z91.041 RADIOGRAPHIC DYE ALLERGY STATUS: ICD-10-CM

## 2023-04-30 DIAGNOSIS — G43.909 MIGRAINE, UNSPECIFIED, NOT INTRACTABLE, WITHOUT STATUS MIGRAINOSUS: ICD-10-CM

## 2023-04-30 DIAGNOSIS — Z98.890 OTHER SPECIFIED POSTPROCEDURAL STATES: Chronic | ICD-10-CM

## 2023-04-30 DIAGNOSIS — Z86.73 PERSONAL HISTORY OF TRANSIENT ISCHEMIC ATTACK (TIA), AND CEREBRAL INFARCTION WITHOUT RESIDUAL DEFICITS: ICD-10-CM

## 2023-04-30 DIAGNOSIS — R51.9 HEADACHE, UNSPECIFIED: ICD-10-CM

## 2023-04-30 DIAGNOSIS — H43.399 OTHER VITREOUS OPACITIES, UNSPECIFIED EYE: ICD-10-CM

## 2023-04-30 DIAGNOSIS — Z90.79 ACQUIRED ABSENCE OF OTHER GENITAL ORGAN(S): ICD-10-CM

## 2023-04-30 DIAGNOSIS — Z87.42 PERSONAL HISTORY OF OTHER DISEASES OF THE FEMALE GENITAL TRACT: ICD-10-CM

## 2023-04-30 LAB
ALBUMIN SERPL ELPH-MCNC: 3.8 G/DL — SIGNIFICANT CHANGE UP (ref 3.5–5.2)
ALP SERPL-CCNC: 82 U/L — SIGNIFICANT CHANGE UP (ref 30–115)
ALT FLD-CCNC: 38 U/L — SIGNIFICANT CHANGE UP (ref 0–41)
ANION GAP SERPL CALC-SCNC: 12 MMOL/L — SIGNIFICANT CHANGE UP (ref 7–14)
APTT BLD: 28.3 SEC — SIGNIFICANT CHANGE UP (ref 27–39.2)
AST SERPL-CCNC: 21 U/L — SIGNIFICANT CHANGE UP (ref 0–41)
BILIRUB SERPL-MCNC: <0.2 MG/DL — SIGNIFICANT CHANGE UP (ref 0.2–1.2)
BUN SERPL-MCNC: 14 MG/DL — SIGNIFICANT CHANGE UP (ref 10–20)
CALCIUM SERPL-MCNC: 9.3 MG/DL — SIGNIFICANT CHANGE UP (ref 8.4–10.4)
CHLORIDE SERPL-SCNC: 104 MMOL/L — SIGNIFICANT CHANGE UP (ref 98–110)
CO2 SERPL-SCNC: 24 MMOL/L — SIGNIFICANT CHANGE UP (ref 17–32)
CREAT SERPL-MCNC: 0.7 MG/DL — SIGNIFICANT CHANGE UP (ref 0.7–1.5)
EGFR: 116 ML/MIN/1.73M2 — SIGNIFICANT CHANGE UP
GLUCOSE SERPL-MCNC: 101 MG/DL — HIGH (ref 70–99)
HCG SERPL QL: NEGATIVE — SIGNIFICANT CHANGE UP
HCT VFR BLD CALC: 38.7 % — SIGNIFICANT CHANGE UP (ref 37–47)
HGB BLD-MCNC: 12.9 G/DL — SIGNIFICANT CHANGE UP (ref 12–16)
INR BLD: 0.98 RATIO — SIGNIFICANT CHANGE UP (ref 0.65–1.3)
MCHC RBC-ENTMCNC: 27.4 PG — SIGNIFICANT CHANGE UP (ref 27–31)
MCHC RBC-ENTMCNC: 33.3 G/DL — SIGNIFICANT CHANGE UP (ref 32–37)
MCV RBC AUTO: 82.2 FL — SIGNIFICANT CHANGE UP (ref 81–99)
NRBC # BLD: 0 /100 WBCS — SIGNIFICANT CHANGE UP (ref 0–0)
PLATELET # BLD AUTO: 390 K/UL — SIGNIFICANT CHANGE UP (ref 130–400)
PMV BLD: 9.5 FL — SIGNIFICANT CHANGE UP (ref 7.4–10.4)
POTASSIUM SERPL-MCNC: 4.5 MMOL/L — SIGNIFICANT CHANGE UP (ref 3.5–5)
POTASSIUM SERPL-SCNC: 4.5 MMOL/L — SIGNIFICANT CHANGE UP (ref 3.5–5)
PROT SERPL-MCNC: 6.4 G/DL — SIGNIFICANT CHANGE UP (ref 6–8)
PROTHROM AB SERPL-ACNC: 11.2 SEC — SIGNIFICANT CHANGE UP (ref 9.95–12.87)
RBC # BLD: 4.71 M/UL — SIGNIFICANT CHANGE UP (ref 4.2–5.4)
RBC # FLD: 14.4 % — SIGNIFICANT CHANGE UP (ref 11.5–14.5)
SODIUM SERPL-SCNC: 140 MMOL/L — SIGNIFICANT CHANGE UP (ref 135–146)
VALPROATE SERPL-MCNC: 48 UG/ML — LOW (ref 50–100)
WBC # BLD: 12.99 K/UL — HIGH (ref 4.8–10.8)
WBC # FLD AUTO: 12.99 K/UL — HIGH (ref 4.8–10.8)

## 2023-04-30 PROCEDURE — 99284 EMERGENCY DEPT VISIT MOD MDM: CPT | Mod: 25

## 2023-04-30 PROCEDURE — 80164 ASSAY DIPROPYLACETIC ACD TOT: CPT

## 2023-04-30 PROCEDURE — 85730 THROMBOPLASTIN TIME PARTIAL: CPT

## 2023-04-30 PROCEDURE — 70450 CT HEAD/BRAIN W/O DYE: CPT | Mod: 26,MA,59

## 2023-04-30 PROCEDURE — 84703 CHORIONIC GONADOTROPIN ASSAY: CPT

## 2023-04-30 PROCEDURE — 36415 COLL VENOUS BLD VENIPUNCTURE: CPT

## 2023-04-30 PROCEDURE — 99283 EMERGENCY DEPT VISIT LOW MDM: CPT

## 2023-04-30 PROCEDURE — 70496 CT ANGIOGRAPHY HEAD: CPT | Mod: MA

## 2023-04-30 PROCEDURE — 70496 CT ANGIOGRAPHY HEAD: CPT | Mod: 26,MA

## 2023-04-30 PROCEDURE — 85027 COMPLETE CBC AUTOMATED: CPT

## 2023-04-30 PROCEDURE — 70450 CT HEAD/BRAIN W/O DYE: CPT | Mod: MA

## 2023-04-30 PROCEDURE — 99285 EMERGENCY DEPT VISIT HI MDM: CPT

## 2023-04-30 PROCEDURE — 80053 COMPREHEN METABOLIC PANEL: CPT

## 2023-04-30 PROCEDURE — 96374 THER/PROPH/DIAG INJ IV PUSH: CPT

## 2023-04-30 PROCEDURE — 85610 PROTHROMBIN TIME: CPT

## 2023-04-30 RX ORDER — ACETAMINOPHEN 500 MG
650 TABLET ORAL ONCE
Refills: 0 | Status: COMPLETED | OUTPATIENT
Start: 2023-04-30 | End: 2023-04-30

## 2023-04-30 RX ORDER — DIVALPROEX SODIUM 500 MG/1
3 TABLET, DELAYED RELEASE ORAL
Qty: 15 | Refills: 0
Start: 2023-04-30 | End: 2023-05-04

## 2023-04-30 RX ORDER — SODIUM CHLORIDE 9 MG/ML
1000 INJECTION, SOLUTION INTRAVENOUS ONCE
Refills: 0 | Status: COMPLETED | OUTPATIENT
Start: 2023-04-30 | End: 2023-04-30

## 2023-04-30 RX ORDER — METOCLOPRAMIDE HCL 10 MG
10 TABLET ORAL ONCE
Refills: 0 | Status: COMPLETED | OUTPATIENT
Start: 2023-04-30 | End: 2023-04-30

## 2023-04-30 RX ORDER — DIVALPROEX SODIUM 500 MG/1
500 TABLET, DELAYED RELEASE ORAL ONCE
Refills: 0 | Status: COMPLETED | OUTPATIENT
Start: 2023-04-30 | End: 2023-04-30

## 2023-04-30 RX ADMIN — DIVALPROEX SODIUM 500 MILLIGRAM(S): 500 TABLET, DELAYED RELEASE ORAL at 11:30

## 2023-04-30 RX ADMIN — Medication 10 MILLIGRAM(S): at 04:36

## 2023-04-30 RX ADMIN — SODIUM CHLORIDE 1000 MILLILITER(S): 9 INJECTION, SOLUTION INTRAVENOUS at 04:37

## 2023-04-30 RX ADMIN — Medication 650 MILLIGRAM(S): at 04:35

## 2023-04-30 NOTE — ED PROVIDER NOTE - PATIENT PORTAL LINK FT
You can access the FollowMyHealth Patient Portal offered by [facility] by registering at the following website: [email or web address]. By joining [employer]’s FollowMyHealth portal, you will also be able to view your health information using other applications (apps) compatible with our system.

## 2023-04-30 NOTE — ED PROVIDER NOTE - ATTENDING CONTRIBUTION TO CARE
32 year old F with hx of rt occipital IPH 04/22, opthalmic migraines, cerebral angiogram and embolization of pial AV with Dr. Gonzalez at [facility] 08/22 p/w "head pressure" x 2 days associated with flashing lights to the left eye since yesterday. 32 year old F with hx of rt occipital IPH 04/22, opthalmic migraines, cerebral angiogram and embolization of pial AV with Dr. Gonzalez at [facility] 08/22 p/w "head pressure" x 2 days associated with flashing lights to the left eye since yesterday. Patient has similar presentation in January when she was evaluated by neurology, patient at that time had an increased dose in her Depakote for symptoms.  Patient taking Depakote for seizure prophylaxis per patient. follows with Dr. Genaro manley. endorses chronic left peripheral vision loss.     CONSTITUTIONAL: Well-developed; well-nourished; in no acute distress.   SKIN: warm, dry  HEAD: Normocephalic; atraumatic.  EYES: PERRL, EOMI, no conjunctival erythema  ENT: No nasal discharge; airway clear.  NECK: Supple; non tender.  CARD: S1, S2 normal; no murmurs, gallops, or rubs. Regular rate and rhythm.   RESP: No wheezes, rales or rhonchi.  ABD: soft ntnd  EXT: Normal ROM.  No clubbing, cyanosis or edema.   NEURO: chronic left peripheral vision loss. CN II-XII grossly intact, no facial asymmetry, no slurred speech. Strength 5/5 in upper and lower extremities. Sensation intact in all extremities. FNF testing normal. No pronator drift. Negative Rhombergs test. Normal gait.  PSYCH: Cooperative, appropriate.      POCUS of left occular, no evidence of retinal hemorrhage or vitreous hemorrhage.

## 2023-04-30 NOTE — ED PROVIDER NOTE - OBJECTIVE STATEMENT
32 year old F with hx of rt occipital IPH 04/22, opthalmic migraines, cerebral angiogram and embolization of pial AV with Dr. Gonzalez at [facility] 08/22 presenting to er c/o worsening b/l flashing lights. 32 year old F with hx of rt occipital IPH 04/22, opthalmic migraines, cerebral angiogram and embolization of pial AV with Dr. Gonzalez at [facility] 08/22 presenting to er c/o worsening head pressure, nausea, and b/l flashing lights. pt was seen for this same complaint in January, had a normal workup, was seen by Dr. Malik and improved with increasing her Depakote. Pt states that she was told to increase her Depakote again if these symptoms recur but she did not do and presents for evaluation. Otherwise denies any fever, chills, cough, congestion, cp, palpitations, sob, n/v/d, abd pain, constipation, urinary complaints, lower extremity pain/swelling.

## 2023-04-30 NOTE — ED PROVIDER NOTE - CLINICAL SUMMARY MEDICAL DECISION MAKING FREE TEXT BOX
.    Pt w/ b/l flashing light/ floaters. sx improved in ED. All available lab tests, imaging tests, and EKGs independently reviewed and interpreted by me. CT imaging neg. Pt seen by neuro, cleared by their service, recc increasing depakote (see chart). Discussed all available results with Pt.  Pt understands results, plan of care, outpt follow up with neuro as discussed, and signs and symptoms for ED return.  Pt is comfortable with discharge. DC home.    .

## 2023-04-30 NOTE — ED ADULT NURSE NOTE - NSICDXPASTMEDICALHX_GEN_ALL_CORE_FT
PAST MEDICAL HISTORY:  Bicornuate uterus     Cerebrovascular dural AV fistula     Migraine headache     Obesity     PCOS (polycystic ovarian syndrome)     TIA (transient ischemic attack) [facility]-4/2022-inf lat visual field loss

## 2023-04-30 NOTE — ED ADULT NURSE NOTE - TEMPLATE LIST FOR HEAD TO TOE ASSESSMENT
[facility] phone call message- general phone call:    Reason for call: Pt's mom called to return Doctor's phone call     Return call needed: Yes    OK to leave a message on voice mail? Yes    Primary language: Kaity      needed? No    Call taken on March 22, 2022 at 1:07 PM by Janis Mora   Neuro

## 2023-04-30 NOTE — ED ADULT NURSE NOTE - NSIMPLEMENTINTERV_GEN_ALL_ED
Implemented All Universal Safety Interventions:  [place] to call system. Call bell, personal items and telephone within reach. Instruct patient to call for assistance. Room bathroom lighting operational. Non-slip footwear when patient is off stretcher. Physically safe environment: no spills, clutter or unnecessary equipment. Stretcher in lowest position, wheels locked, appropriate side rails in place.

## 2023-04-30 NOTE — ED ADULT NURSE NOTE - OBJECTIVE STATEMENT
pt presents to ED for "flashes" in left eye and head pressure for two days. pt states she has been attempting to contact neurologist but has not received response, was told to take extra depakote by on call doctor. Hx of brain bleed 1 year ago with similar symptoms

## 2023-04-30 NOTE — ED PROVIDER NOTE - NSICDXPASTMEDICALHX_GEN_ALL_CORE_FT
PAST MEDICAL HISTORY:  Bicornuate uterus     Cerebrovascular dural AV fistula     Migraine headache     Obesity     PCOS (polycystic ovarian syndrome)     TIA (transient ischemic attack) [facility]-4/2022-inf lat visual field loss

## 2023-04-30 NOTE — ED ADULT NURSE REASSESSMENT NOTE - NS ED NURSE REASSESS COMMENT FT1
Pt. assessed. NAD at this time. Safety precautions maintained. Will continue to monitor, round, assess.

## 2023-04-30 NOTE — ED PROVIDER NOTE - CARE PROVIDER_API CALL
Shoaib Garcia)  EEGEpilepsy; Neurology  [address] [place] [address], [address]  [place] [address]  Phone: [phone]  Fax: [phone]  Established Patient  Follow Up Time: 1-3 Days

## 2023-04-30 NOTE — ED PROVIDER NOTE - PHYSICAL EXAMINATION
CONSTITUTIONAL: well-appearing, in NAD  SKIN: Warm dry, normal skin turgor  HEAD: NCAT  EYES: EOMI, PERRLA, no scleral icterus, conjunctiva pink  OU IOP 11  ENT: normal pharynx with no erythema or exudates  NECK: Supple; non tender. Full ROM.  CARD: RRR, no murmurs.  RESP: clear to ausculation b/l. No crackles or wheezing.  ABD: soft, non-tender, non-distended, no rebound or guarding.  EXT: Full ROM, no bony tenderness, no pedal edema, no calf tenderness  NEURO: normal motor. normal sensory. CN II-XII intact. Cerebellar testing normal. Normal gait.  PSYCH: Cooperative, appropriate.

## 2023-04-30 NOTE — ED PROVIDER NOTE - NSFOLLOWUPINSTRUCTIONS_ED_ALL_ED_FT
CONTINUE TO TAKE YOUR 500MG DEPAKOTE IN THE MORNING. PRESCRIPTION WAS SENT FOR 750MG DEPAKOTE TO BE TAKEN AT BED TIME. PLEASE FOLLOW UP WITH YOUR NEUROLOGIST DR. CARLOS MARTÍNEZ      Visual Disturbances  An eye with a detached retina.  A visual disturbance is any problem that interferes with your normal vision. This can affect one eye or both eyes. Some types of visual disturbances come and go without treatment and do not cause a permanent problem. Other visual disturbances may be a sign of an eye emergency or a medical emergency.    Visual disturbances include:  Blurred vision.  Being unable to see certain colors.  Being sensitive to light.  Double vision in one eye or both eyes.  Partial vision loss (visual field deficit).  Being unaware of objects on one side of the body (visual spatial inattention).  Rhythmic eye movements that you cannot control (nystagmus).  Short-term or long-term blindness.  Seeing:  Floating spots or lines (floaters).  Flashing or shimmering lights.  Zigzagging lines or stars.  The floor as tilted (visual midline shift).  Things that are not really there (hallucinations).  Causes of visual disturbances include:  Dry eyes.  Eye infection.  The thin membrane at the back of the eye  from the eyeball (retinal detachment).  High blood pressure.  Migraine.  Glaucoma.  Ischemic stroke.  Cerebral aneurysm.  Diabetes.  It is important to get your eyes checked by a health care provider or eye care specialist (ophthalmologist or optometrist) as soon as possible to determine the cause of your visual disturbance.    Follow these instructions at home:  Take over-the-counter and prescription medicines only as told by your health care provider.  Do not use any products that contain nicotine or tobacco. These products include cigarettes, chewing tobacco, and vaping devices, such as e-cigarettes. If you need help quitting, ask your health care provider.  To lower your risk of the problems that can lead to visual disturbances:  Eat a balanced diet that includes fruits and vegetables, whole grains, lean meat, and low-fat dairy.  Maintain a healthy weight. Work with your health care provider to lose weight if you need to.  Exercise regularly. Ask your health care provider what activities are safe for you.  Do not drive if you have trouble seeing. Ask your health care provider for guidance about when it is and is not safe for you to drive.  Keep all follow-up visits. This is important.  Contact a health care provider if:  Your visual disturbance changes or becomes worse.  Get help right away if:  A sign showing the "BE FAST" categories for the warning signs of a stroke: balance, eyes, face, arms, speech, and time.  You have new visual disturbances.  You suddenly see flashing lights or floaters.  You suddenly have a dark area in your field of vision, especially in the lower part. This can lead to a loss of central vision.  You suddenly lose vision in one or both eyes.  You have any symptoms of a stroke. "BE FAST" is an easy way to remember the main warning signs of a stroke:  B - Balance. Signs are dizziness, sudden trouble walking, or loss of balance.  E - Eyes. Signs are trouble seeing or a sudden change in vision.  F - Face. Signs are sudden weakness or numbness of the face, or the face or eyelid drooping on one side.  A - Arms. Signs are weakness or numbness in an arm. This happens suddenly and usually on one side of the body.  S - Speech. Signs are sudden trouble speaking, slurred speech, or trouble understanding what people say.  T - Time. Time to call emergency services. Write down what time symptoms started.  Have other signs of a stroke, such as:  A sudden, severe headache with no known cause.  Nausea or vomiting.  A seizure.  These symptoms may represent a serious problem that is an emergency. Do not wait to see if the symptoms will go away. Get medical help right away. Call your local emergency services (911 in the [place] Do not drive yourself to the hospital.    Summary  A visual disturbance is any problem that interferes with your normal vision.  It is important to get your eyes checked by a health care provider or eye care specialist to determine what kind of visual disturbance you have.  Some visual disturbances may be a sign of an eye emergency or medical emergency.  This information is not intended to replace advice given to you by your health care provider. Make sure you discuss any questions you have with your health care provider.

## 2023-04-30 NOTE — CONSULT NOTE ADULT - SUBJECTIVE AND OBJECTIVE BOX
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 Gonzalez at [facility] 08/22 presenting presented for worsening of HA and scotomas.   States the headache started about two days ago retroorbital b/l with L>R scotomas, mild intensity without N/V, or photophobia and phonophobias. She was on DA 250mg BID in the past which was increased to 500mg BID in about March when the scotomas resolved. However she didn't increased the DA to higher doses despite the outpatient neurology recommendations given he scotomas were resolved. Currently in ED, HA resolved after Tylenol and Reglan.   She states her mood is depressed but she is not willing to start any medication for her anxiety, states she has been through alot and was woring about the headaches and scotomas but now CTH is negative and no evidence of bleed her symptoms improved as well. She is also amendable to increased the Depakote and follow up with Dr. Diaz as outpatient.    MEDICATIONS  Home Medications:  Depakote 250 mg oral delayed release tablet: 1 tab(s) orally 2 times a day (19 Jan 2023 18:00)    MEDICATIONS  (STANDING):    MEDICATIONS  (PRN):      FAMILY HISTORY:  Family history of systemic lupus erythematosus (SLE) in mother (Mother)    Family history of hemophilia A (Sibling)    Family history of antiphospholipid syndrome (Mother)      SOCIAL HISTORY: negative for tobacco, alcohol, or ilicit drug use.    Allergies      Intolerances        GEN: NAD, pleasant, cooperative    NEURO:   MENTAL STATUS: AAOx3  LANG/SPEECH: Fluent, intact naming, repetition & comprehension  CRANIAL NERVES:  II: Pupils equal and reactive, no RAPD, normal visual field.  III, IV, VI: EOM intact, no gaze preference or deviation  V: normal  VII: no facial asymmetry  VIII: normal hearing to speech  MOTOR: 5/5 in both upper and lower extremities  REFLEXES: 2/4 throughout, bilateral flexor plantars  SENSORY: Normal to touch in all extremities  COORD: Normal finger to nose and heel to shin, no tremor, no dysmetria  Gait: Steady.     LABS:                        12.9   12.99 )-----------( 390      ( 30 Apr 2023 04:35 )             38.7     04-30    140  |  104  |  14  ----------------------------<  101<H>  4.5   |  24  |  0.7    Ca    9.3      30 Apr 2023 04:35    TPro  6.4  /  Alb  3.8  /  TBili  <0.2  /  DBili  x   /  AST  21  /  ALT  38  /  AlkPhos  82  04-30    Hemoglobin A1C:   Vitamin B12   PT/INR - ( 30 Apr 2023 05:57 )   PT: 11.20 sec;   INR: 0.98 ratio         PTT - ( 30 Apr 2023 05:57 )  PTT:28.3 sec  CAPILLARY BLOOD GLUCOSE                  Microbiology:      RADIOLOGY, EKG AND ADDITIONAL TESTS: Reviewed.  < from: CT Head No Cont (04.30.23 @ 07:46) >  IMPRESSION:  No hydrocephalus, acute intracranial hemorrhage, or mass effect.    --- End of Report ---      < end of copied text >  < from: CT Angio Head w/ IV Cont (04.30.23 @ 07:59) >    IMPRESSION:    No evidence of flow-limiting stenosis, occlusion or aneurysm.              ******PRELIMINARY REPORT******      ******PRELIMINARY REPORT******     < end of copied text >     NEUROLOGY CONSULT    HPI:  32 RHF with PMHx of Migraine, anxiety, right occipital IPH 4/22, cerebral angiogram and embolization of pial AV with Dr. Gonzalez at [facility] 08/22 presenting presented for worsening of HA and scotomas.   States the headache started about two days ago retroorbital b/l with L>R scotomas, mild intensity without N/V, or photophobia and phonophobias. She was on DA 250mg BID in the past which was increased to 500mg BID in about March when the scotomas resolved. However she didn't increased the DA to higher doses despite the outpatient neurology recommendations given he scotomas were resolved. Currently in ED, HA resolved after Tylenol and Reglan.   She states her mood is depressed but she is not willing to start any medication for her anxiety, states she has been through alot and was worrying about the headaches and scotomas but now CTH is negative and no evidence of bleed her symptoms improved as well. She is also amendable to increased the Depakote and follow up with Dr. Diaz as outpatient.    MEDICATIONS  Home Medications:  Depakote 250 mg oral delayed release tablet: 1 tab(s) orally 2 times a day (19 Jan 2023 18:00)    MEDICATIONS  (STANDING):    MEDICATIONS  (PRN):      FAMILY HISTORY:  Family history of systemic lupus erythematosus (SLE) in mother (Mother)    Family history of hemophilia A (Sibling)    Family history of antiphospholipid syndrome (Mother)      SOCIAL HISTORY: negative for tobacco, alcohol, or ilicit drug use.    Allergies      Intolerances        GEN: NAD, pleasant, cooperative    NEURO:   MENTAL STATUS: AAOx3  LANG/SPEECH: Fluent, intact naming, repetition & comprehension  CRANIAL NERVES:  II: Pupils equal and reactive, no RAPD, normal visual field.  III, IV, VI: EOM intact, no gaze preference or deviation  V: normal  VII: no facial asymmetry  VIII: normal hearing to speech  MOTOR: 5/5 in both upper and lower extremities  REFLEXES: 2/4 throughout, bilateral flexor plantars  SENSORY: Normal to touch in all extremities  COORD: Normal finger to nose and heel to shin, no tremor, no dysmetria  Gait: Steady.     LABS:                        12.9   12.99 )-----------( 390      ( 30 Apr 2023 04:35 )             38.7     04-30    140  |  104  |  14  ----------------------------<  101<H>  4.5   |  24  |  0.7    Ca    9.3      30 Apr 2023 04:35    TPro  6.4  /  Alb  3.8  /  TBili  <0.2  /  DBili  x   /  AST  21  /  ALT  38  /  AlkPhos  82  04-30    Hemoglobin A1C:   Vitamin B12   PT/INR - ( 30 Apr 2023 05:57 )   PT: 11.20 sec;   INR: 0.98 ratio         PTT - ( 30 Apr 2023 05:57 )  PTT:28.3 sec  CAPILLARY BLOOD GLUCOSE                  Microbiology:      RADIOLOGY, EKG AND ADDITIONAL TESTS: Reviewed.  < from: CT Head No Cont (04.30.23 @ 07:46) >  IMPRESSION:  No hydrocephalus, acute intracranial hemorrhage, or mass effect.    --- End of Report ---      < end of copied text >  < from: CT Angio Head w/ IV Cont (04.30.23 @ 07:59) >    IMPRESSION:    No evidence of flow-limiting stenosis, occlusion or aneurysm.              ******PRELIMINARY REPORT******      ******PRELIMINARY REPORT******     < end of copied text >

## 2023-04-30 NOTE — ED PROVIDER NOTE - PROGRESS NOTE DETAILS
PK: bedside U/S shows no signs of vitreous hemorrhage, retinal or vitreous detachment, normal IOP. Labs and CT head non con and CTA ordered. DC: endorsed to Dr. Hassan pending imaging, possible neurology consult, reassessment. DUNIA: Signout received from Dr. Vidales pending CT reads and neurology recommendations.  CT without findings, neurology team recommended increasing nighttime dose of Depakote from 500 to 750 mg, keeping a.m. dose of 500 mg the same.  Discussed in length with patient, will send 750 mg to pharmacy for few days and she will take this at nighttime instead of her usual 500 dose, she will keep the 500 mg a.m. dose the same.  Will DC home with close outpatient follow-up.  Supportive care and return precautions advised.  Patient comfortable with plan.    Patient to be discharged from ED. Any available test results were discussed with patient and/or family. Verbal instructions given, including instructions to return to ED immediately for any new, worsening, or concerning symptoms. Patient endorsed understanding. Written discharge instructions additionally given, including follow-up plan. DUNIA: Signout received from Dr. Vidales pending CT reads and neurology recommendations.  CT without findings, neurology team recommended 750mg DR depakote now and increasing nighttime dose of Depakote from 500 to 750 mg, keeping a.m. dose of 500 mg the same.  Discussed in length with patient, will send 750 mg to pharmacy for few days and she will take this at nighttime instead of her usual 500 dose, she will keep the 500 mg a.m. dose the same.  Will DC home with close outpatient follow-up.  Supportive care and return precautions advised.  Patient comfortable with plan.    Patient to be discharged from ED. Any available test results were discussed with patient and/or family. Verbal instructions given, including instructions to return to ED immediately for any new, worsening, or concerning symptoms. Patient endorsed understanding. Written discharge instructions additionally given, including follow-up plan.

## 2023-04-30 NOTE — CONSULT NOTE ADULT - ASSESSMENT
32 RHF with PMHx of Migraine, anxiety, right occipital IPH 4/22, cerebral angiogram and embolization of pial AV with Dr. Gonzalez at [facility] 08/22 presenting presented for worsening of HA and scotomas.   States the headache started about two days ago retroorbital b/l with L>R scotomas, mild intensity without N/V, or photophobia and phonophobias. She was on DA 250mg BID in the past which was increased to 500mg BID in about March when the scotomas resolved. However she didn't increased the DA to higher doses despite the outpatient neurology recommendations given he scotomas were resolved. Currently in ED, HA resolved after Tylenol and Reglan.   She states her mood is depressed but she is not willing to start any medication for her anxiety, states she has been through alot and was woring about the headaches and scotomas but now CTH is negative and no evidence of bleed her symptoms improved as well. She is also amendable to increased the Depakote and follow up with Dr. Diaz as outpatient.   Headache is resolved and patient received reassurance with CTH report negative for hemorrhage.     Recommendations:   ·	Please give Depakote delayed release 750mg now.   ·	She can be discharged from neurological standpoint with Depakote delayed release 500mg AM and 750mg PM. (After final CTA official report).   ·	Follow up with her outpatient neurologist Dr. Diaz.   ·	Remaining care at discretion of primary team.   ·	Case discussed with neurology attending.     Thank you for the courtesy of this consult, Please contact us if any neurological changes or questions, neurology team will continue to follow.   32 RHF with PMHx of Migraine, anxiety, right occipital IPH 4/22, cerebral angiogram and embolization of pial AV with Dr. Gonzalez at [facility] 08/22 presenting presented for worsening of HA and scotomas.   States the headache started about two days ago retroorbital b/l with L>R scotomas, mild intensity without N/V, or photophobia and phonophobias. She was on DA 250mg BID in the past which was increased to 500mg BID in about March when the scotomas resolved. However she didn't increased the DA to higher doses despite the outpatient neurology recommendations given he scotomas were resolved. Currently in ED, HA resolved after Tylenol and Reglan.   She states her mood is depressed but she is not willing to start any medication for her anxiety, states she has been through alot and was worrying about the headaches and scotomas but now CTH is negative and no evidence of bleed her symptoms improved as well. She is also amendable to increased the Depakote and follow up with Dr. Diaz as outpatient.   Headache is resolved and patient received reassurance with CTH report negative for hemorrhage. Current serum DA level 48.     Recommendations:   ·	Please give Depakote delayed release 750mg now.   ·	She can be discharged from neurological standpoint with Depakote delayed release 500mg AM and 750mg PM. (After final CTA official report).   ·	Follow up with her outpatient neurologist Dr. Diaz.   ·	Remaining care at discretion of primary team.   ·	Case discussed with neurology attending.     Thank you for the courtesy of this consult, Please contact us if any neurological changes or questions, neurology team will continue to follow.

## 2023-04-30 NOTE — ED ADULT TRIAGE NOTE - CHIEF COMPLAINT QUOTE
pt presents to ED for "flashes" in left eye and head pressure for two days. pt states she has been attempting to contact neurologist but has not received response, was told to take extra depakote by on call doctor.

## 2023-05-03 ENCOUNTER — APPOINTMENT (OUTPATIENT)
Dept: NEUROLOGY | Facility: CLINIC | Age: 33
End: 2023-05-03
Payer: MEDICAID

## 2023-05-03 VITALS
HEIGHT: 62 IN | WEIGHT: 200 LBS | SYSTOLIC BLOOD PRESSURE: 117 MMHG | TEMPERATURE: 98 F | HEART RATE: 81 BPM | OXYGEN SATURATION: 98 % | BODY MASS INDEX: 36.8 KG/M2 | DIASTOLIC BLOOD PRESSURE: 84 MMHG

## 2023-05-03 PROCEDURE — 99214 OFFICE O/P EST MOD 30 MIN: CPT

## 2023-05-03 RX ORDER — DIVALPROEX SODIUM 250 MG/1
250 TABLET, DELAYED RELEASE ORAL
Qty: 60 | Refills: 5 | Status: DISCONTINUED | COMMUNITY
Start: 2023-02-13 | End: 2023-05-03

## 2023-05-03 NOTE — HISTORY OF PRESENT ILLNESS
[FreeTextEntry1] : Patient is 33 yo RH woman, never smoker, with FMHx of Hemophilia A and PMHx of ophthalmic migraines, pre-eclampsia (normotensive afterwards w/o meds) and PCOS with finding of R occipital IPH found during work up for severe unusual HA in 4/2022. She has 2 diagnostic cerebral angiograms with Dr. Castro, one on 4/28/22 and one on 6/16/22. The repeat angiogram in 6/2022 showed small early shunting of the right parieto-occipital artery which seemed WORSE in comparison to the previous cerebral DSA. 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 Gonzalez on 8/29/22 for which she presents today in our [facility] for f/u. She was cleared by Dr. Galindo in follow up in 9/2022 to return to her previous quality of life.  \par \par Today, she presents with mom and for last 5 days has been having frequent visual auras of colors and shapes obscuring her vision.  She has also been having right sided headache.  She went to the ED and her depakote was increased to 500/750mg BID and the episodes havent stopped completely yet.  There was a recommendation to be worked up for IIH with LP but she refused.  She has seen ophtho earlier in year and did not have optic disc edema or field cuts.\par I had her take her morning dose at visit time plus an additional 500mg (total 1000mg this morning.  Her maintenance dose will be changed to 750mg BID.

## 2023-05-03 NOTE — ASSESSMENT
[FreeTextEntry1] : Patient is 33 yo RH woman, never smoker, with FMHx of Hemophilia A and PMHx of ophthalmic migraines, pre-eclampsia (normotensive afterwards w/o meds) and PCOS with finding of R occipital IPH found during work up for severe unusual HA in 4/2022. She has 2 diagnostic cerebral angiograms with Dr. Castro, one on 4/28/22 and one on 6/16/22. The repeat angiogram in 6/2022 showed small early shunting of the right parieto-occipital artery which seemed WORSE in comparison to the previous cerebral DSA. 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 Gonzalez on 8/29/22 for which she presents today in our [facility] for f/u. She was cleared by Dr. Galindo in follow up in 9/2022 to return to her previous quality of life.  Today, she presents with mom and daughter and continues to have positive visual symptoms almost daily.  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 Recent ED visit for headaches and near constant visual illusions.\par \par PLAN:\par 1. Increase depakote /750mg BID\par 2. f/u with DSA\par 3. Monitor BP at home\par 4. Ubrelvy PRN for migraines\par 5. f/u in 3-6 months\par 6. Discussed reasons to Psychiatric\par \par \par \par \par

## 2023-05-07 NOTE — HISTORY OF PRESENT ILLNESS
[de-identified] : 32-year-old RH pleasant female, non-smoker, with a FMHx of Hemophilia A and PMHx of ophthalmic migraines, pre-eclampsia (normotensive afterwards without medications) and PCOS with finding of R occipital IPH found during work-up for a severe unusual headache in 4/2022. \par \par Ms. Benítez states that in April 2022 she experienced a headache of a quick and sudden onset to her right temporal lobe "that she never experienced before" that subsided as fast as it came on.  It was approximately 3am, and her usual treatment of headaches/migraines was to take Imitrex which she did.  She began to panic and went to [facility] when she had left sided peripheral vision loss. CT head demonstrated hemorrhage which warranted an immediate transfer to [facility] where she then went under the care of Dr. Castro. She then had two diagnostic cerebral angiograms with Dr. Castro, one on 4/28/22 and one on 6/16/22. The repeat angiogram in 6/2022 showed small early shunting of the right parieto-occipital artery which seemed worse when compared to the previous cerebral DSA. At  Conference, it was thought that the pial AV fistula could be an explanation for her IPH and she underwent uneventful and successful cerebral angiogram and embolization of pial AV fistula with Dr. Gonzalez on 8/29/22 for which she presents today in our [facility] for a follow-up. She was cleared by Dr. Galindo in 9/2022 to return to her previous quality of life with no additional follow-ups needed.\par \par She presents today with complaints of flashing in her left eye since the second angiogram with colors of blue, white, red, yellow and sporadic double vision and continued loss of peripheral vision.  She states that the prior treating physicians were aware of this but did not seem concerned.  Patient has seen neuro-ophthalmology in May but no there was no significant finding. Ms. Benítez to the emergency department yesterday for ophthalmological complaints, CT head noncontrast done and outpatient 48 hour EEG planned for today with Dr. Hanna to be placed on patient. \par \par She remains on Depakote 250mg PO BID\par Endocrine: Dr. Yemi Garcia MD; patient educated to return for a consultation, for she mentioned during the last hospitalization she had a high glucose level\par \par Patient educated that the flashing could be indicative of seizure-like activity and that she should proceed with the plan delineated by Dr. Hanna with the EEG monitoring. \par \par Discussion of a diagnostic cerebral angiogram took place and the benefits of performing such procedure in order to obtain detailed, clear and accurate pictures of the blood vessels in the brain and to assess the prior embolization performed of the AVF, as it is approximately 6 months post-treatment. The risks of therapy were discussed including but not limited to death, stroke, bleeding, access site complications, vessel perforation and the need for surveillance. The patient wishes to proceed and we will schedule her accordingly. \par \par Diagnostic imaging in the form of MRI/MRA/MRV also ordered. \par \par No concerns were identified at this time and all questions were answered.  Patient was tearful and emotional support was rendered.

## 2023-05-10 ENCOUNTER — INPATIENT (INPATIENT)
Facility: HOSPITAL | Age: 33
LOS: 1 days | Discharge: HOME | DRG: 58 | End: 2023-05-12
Attending: NEUROLOGICAL SURGERY | Admitting: NEUROLOGICAL SURGERY
Payer: MEDICAID

## 2023-05-10 ENCOUNTER — EMERGENCY (EMERGENCY)
Facility: HOSPITAL | Age: 33
LOS: 0 days | Discharge: LEFT BEFORE DISCHARGE | End: 2023-05-10
Payer: MEDICAID

## 2023-05-10 ENCOUNTER — APPOINTMENT (OUTPATIENT)
Dept: NEUROSURGERY | Facility: CLINIC | Age: 33
End: 2023-05-10
Payer: MEDICAID

## 2023-05-10 VITALS
HEIGHT: 55 IN | HEART RATE: 88 BPM | WEIGHT: 199.96 LBS | TEMPERATURE: 98 F | SYSTOLIC BLOOD PRESSURE: 133 MMHG | OXYGEN SATURATION: 99 % | DIASTOLIC BLOOD PRESSURE: 79 MMHG | RESPIRATION RATE: 18 BRPM

## 2023-05-10 VITALS
RESPIRATION RATE: 18 BRPM | OXYGEN SATURATION: 99 % | SYSTOLIC BLOOD PRESSURE: 168 MMHG | HEIGHT: 55 IN | DIASTOLIC BLOOD PRESSURE: 79 MMHG | WEIGHT: 199.96 LBS | HEART RATE: 105 BPM | TEMPERATURE: 98 F

## 2023-05-10 VITALS — WEIGHT: 200 LBS | HEIGHT: 62 IN | BODY MASS INDEX: 36.8 KG/M2

## 2023-05-10 DIAGNOSIS — Z98.890 OTHER SPECIFIED POSTPROCEDURAL STATES: Chronic | ICD-10-CM

## 2023-05-10 DIAGNOSIS — H53.9 UNSPECIFIED VISUAL DISTURBANCE: ICD-10-CM

## 2023-05-10 DIAGNOSIS — Z53.21 PROCEDURE AND TREATMENT NOT CARRIED OUT DUE TO PATIENT LEAVING PRIOR TO BEING SEEN BY HEALTH CARE PROVIDER: ICD-10-CM

## 2023-05-10 DIAGNOSIS — R51.9 HEADACHE, UNSPECIFIED: ICD-10-CM

## 2023-05-10 PROCEDURE — 99215 OFFICE O/P EST HI 40 MIN: CPT

## 2023-05-10 PROCEDURE — 99285 EMERGENCY DEPT VISIT HI MDM: CPT

## 2023-05-10 PROCEDURE — L9991: CPT

## 2023-05-10 NOTE — CHART NOTE - NSCHARTNOTEFT_GEN_A_CORE
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 Patient then reported to multiple neuroendovascular providers seeking second opinions, established care with Dr. Gonzalez and underwent cerebral angiogram and embolization of pial AV fistula 8/29/2022. Patient has not since reported for follow up diagnostic cerebal angiogram despite education regarding standard of care to obtain follow up after 6 months. Patient was seen outpatient by Dr. Malik for concerns of frequent visual auras of color and shapes with headache – per neurology Depakote increased to 750 mg BID still with sporadic episodes.     Patient presented today to Dr. Borja’s office as an outpatient hoping to re-establish care at [facility] and undergo a formal diagnostic cerebral angiogram as a follow up. Patient was advised by Dr. Borja to present to the emergency room for pre-op and evaluation for potential angiogram tomorrow AM. Per patient's nurse, patient left within 30 min of triage because she "did not feel like waiting" to be seen by ACP/attending in ED.     Neuroendovascular team aware of patient's pending arrival, notified per outpatient office. Preparing for pending admission under Dr. Borja's service for angiogram tomorrow.   Patient was given options clearly stated at the outpatient office: present to ED for workup and add on to schedule for angiogram tomorrow, present to PST tomorrow for outpatient add on this coming Tuesday.    Awaiting patient's potential return arrival for workup vs coordinate outpatient follow up.   Discussed above with Dr. Borja and outpatient office.  neuroendovascular.

## 2023-05-10 NOTE — ED ADULT TRIAGE NOTE - CHIEF COMPLAINT QUOTE
Patient complaining of headache with visual changes. Patient with a known AVM with associated seizure disorder and under care of Dr. Borja who advised patient to come in for CTA and possible admission. Patient sent in by neurosurgeon Dr. Borja for evaluation and possible admission for headache and visual disturbances. Patient with known AVM with associated seizure disorder and requesting CT angiogram as per MD recommendations.

## 2023-05-11 ENCOUNTER — TRANSCRIPTION ENCOUNTER (OUTPATIENT)
Age: 33
End: 2023-05-11

## 2023-05-11 ENCOUNTER — APPOINTMENT (OUTPATIENT)
Dept: NEUROSURGERY | Facility: HOSPITAL | Age: 33
End: 2023-05-11

## 2023-05-11 DIAGNOSIS — R51.9 HEADACHE, UNSPECIFIED: ICD-10-CM

## 2023-05-11 LAB
ALBUMIN SERPL ELPH-MCNC: 4 G/DL — SIGNIFICANT CHANGE UP (ref 3.5–5.2)
ALP SERPL-CCNC: 83 U/L — SIGNIFICANT CHANGE UP (ref 30–115)
ALT FLD-CCNC: 50 U/L — HIGH (ref 0–41)
ANION GAP SERPL CALC-SCNC: 11 MMOL/L — SIGNIFICANT CHANGE UP (ref 7–14)
APTT BLD: 31.2 SEC — SIGNIFICANT CHANGE UP (ref 27–39.2)
AST SERPL-CCNC: 35 U/L — SIGNIFICANT CHANGE UP (ref 0–41)
BASOPHILS # BLD AUTO: 0.06 K/UL — SIGNIFICANT CHANGE UP (ref 0–0.2)
BASOPHILS NFR BLD AUTO: 0.5 % — SIGNIFICANT CHANGE UP (ref 0–1)
BILIRUB SERPL-MCNC: <0.2 MG/DL — SIGNIFICANT CHANGE UP (ref 0.2–1.2)
BUN SERPL-MCNC: 14 MG/DL — SIGNIFICANT CHANGE UP (ref 10–20)
CALCIUM SERPL-MCNC: 9.5 MG/DL — SIGNIFICANT CHANGE UP (ref 8.4–10.5)
CHLORIDE SERPL-SCNC: 103 MMOL/L — SIGNIFICANT CHANGE UP (ref 98–110)
CO2 SERPL-SCNC: 28 MMOL/L — SIGNIFICANT CHANGE UP (ref 17–32)
CREAT SERPL-MCNC: 0.8 MG/DL — SIGNIFICANT CHANGE UP (ref 0.7–1.5)
EGFR: 99 ML/MIN/1.73M2 — SIGNIFICANT CHANGE UP
EOSINOPHIL # BLD AUTO: 0.36 K/UL — SIGNIFICANT CHANGE UP (ref 0–0.7)
EOSINOPHIL NFR BLD AUTO: 2.8 % — SIGNIFICANT CHANGE UP (ref 0–8)
GLUCOSE SERPL-MCNC: 132 MG/DL — HIGH (ref 70–99)
HCG SERPL QL: NEGATIVE — SIGNIFICANT CHANGE UP
HCG UR QL: NEGATIVE — SIGNIFICANT CHANGE UP
HCT VFR BLD CALC: 41.6 % — SIGNIFICANT CHANGE UP (ref 37–47)
HGB BLD-MCNC: 13.7 G/DL — SIGNIFICANT CHANGE UP (ref 12–16)
IMM GRANULOCYTES NFR BLD AUTO: 0.4 % — HIGH (ref 0.1–0.3)
INR BLD: 0.97 RATIO — SIGNIFICANT CHANGE UP (ref 0.65–1.3)
LYMPHOCYTES # BLD AUTO: 2.91 K/UL — SIGNIFICANT CHANGE UP (ref 1.2–3.4)
LYMPHOCYTES # BLD AUTO: 22.9 % — SIGNIFICANT CHANGE UP (ref 20.5–51.1)
MCHC RBC-ENTMCNC: 27.2 PG — SIGNIFICANT CHANGE UP (ref 27–31)
MCHC RBC-ENTMCNC: 32.9 G/DL — SIGNIFICANT CHANGE UP (ref 32–37)
MCV RBC AUTO: 82.7 FL — SIGNIFICANT CHANGE UP (ref 81–99)
MONOCYTES # BLD AUTO: 0.82 K/UL — HIGH (ref 0.1–0.6)
MONOCYTES NFR BLD AUTO: 6.5 % — SIGNIFICANT CHANGE UP (ref 1.7–9.3)
NEUTROPHILS # BLD AUTO: 8.51 K/UL — HIGH (ref 1.4–6.5)
NEUTROPHILS NFR BLD AUTO: 66.9 % — SIGNIFICANT CHANGE UP (ref 42.2–75.2)
NRBC # BLD: 0 /100 WBCS — SIGNIFICANT CHANGE UP (ref 0–0)
PLATELET # BLD AUTO: 438 K/UL — HIGH (ref 130–400)
PMV BLD: 9.3 FL — SIGNIFICANT CHANGE UP (ref 7.4–10.4)
POTASSIUM SERPL-MCNC: 4.6 MMOL/L — SIGNIFICANT CHANGE UP (ref 3.5–5)
POTASSIUM SERPL-SCNC: 4.6 MMOL/L — SIGNIFICANT CHANGE UP (ref 3.5–5)
PROT SERPL-MCNC: 6.6 G/DL — SIGNIFICANT CHANGE UP (ref 6–8)
PROTHROM AB SERPL-ACNC: 11 SEC — SIGNIFICANT CHANGE UP (ref 9.95–12.87)
RBC # BLD: 5.03 M/UL — SIGNIFICANT CHANGE UP (ref 4.2–5.4)
RBC # FLD: 14.4 % — SIGNIFICANT CHANGE UP (ref 11.5–14.5)
SARS-COV-2 RNA SPEC QL NAA+PROBE: SIGNIFICANT CHANGE UP
SODIUM SERPL-SCNC: 142 MMOL/L — SIGNIFICANT CHANGE UP (ref 135–146)
WBC # BLD: 12.71 K/UL — HIGH (ref 4.8–10.8)
WBC # FLD AUTO: 12.71 K/UL — HIGH (ref 4.8–10.8)

## 2023-05-11 PROCEDURE — 93010 ELECTROCARDIOGRAM REPORT: CPT

## 2023-05-11 PROCEDURE — C1887: CPT

## 2023-05-11 PROCEDURE — 76377 3D RENDER W/INTRP POSTPROCES: CPT | Mod: 26

## 2023-05-11 PROCEDURE — C1769: CPT

## 2023-05-11 PROCEDURE — 71045 X-RAY EXAM CHEST 1 VIEW: CPT | Mod: 26

## 2023-05-11 PROCEDURE — 70450 CT HEAD/BRAIN W/O DYE: CPT | Mod: 26,MA

## 2023-05-11 PROCEDURE — 36226 PLACE CATH VERTEBRAL ART: CPT | Mod: RT

## 2023-05-11 PROCEDURE — 99232 SBSQ HOSP IP/OBS MODERATE 35: CPT

## 2023-05-11 PROCEDURE — 76937 US GUIDE VASCULAR ACCESS: CPT

## 2023-05-11 PROCEDURE — C1894: CPT

## 2023-05-11 PROCEDURE — C9113: CPT

## 2023-05-11 PROCEDURE — 71045 X-RAY EXAM CHEST 1 VIEW: CPT

## 2023-05-11 PROCEDURE — 97162 PT EVAL MOD COMPLEX 30 MIN: CPT | Mod: GP

## 2023-05-11 PROCEDURE — 36227 PLACE CATH XTRNL CAROTID: CPT | Mod: 50

## 2023-05-11 PROCEDURE — 93005 ELECTROCARDIOGRAM TRACING: CPT

## 2023-05-11 PROCEDURE — 36224 PLACE CATH CAROTD ART: CPT | Mod: 50

## 2023-05-11 PROCEDURE — 76937 US GUIDE VASCULAR ACCESS: CPT | Mod: 26

## 2023-05-11 PROCEDURE — 81025 URINE PREGNANCY TEST: CPT

## 2023-05-11 RX ORDER — PANTOPRAZOLE SODIUM 20 MG/1
40 TABLET, DELAYED RELEASE ORAL DAILY
Refills: 0 | Status: DISCONTINUED | OUTPATIENT
Start: 2023-05-11 | End: 2023-05-12

## 2023-05-11 RX ORDER — ACETAMINOPHEN 500 MG
1000 TABLET ORAL ONCE
Refills: 0 | Status: COMPLETED | OUTPATIENT
Start: 2023-05-11 | End: 2023-05-11

## 2023-05-11 RX ORDER — SODIUM CHLORIDE 9 MG/ML
1000 INJECTION INTRAMUSCULAR; INTRAVENOUS; SUBCUTANEOUS
Refills: 0 | Status: DISCONTINUED | OUTPATIENT
Start: 2023-05-11 | End: 2023-05-12

## 2023-05-11 RX ORDER — ONDANSETRON 8 MG/1
4 TABLET, FILM COATED ORAL EVERY 6 HOURS
Refills: 0 | Status: DISCONTINUED | OUTPATIENT
Start: 2023-05-11 | End: 2023-05-12

## 2023-05-11 RX ORDER — SODIUM CHLORIDE 9 MG/ML
1500 INJECTION INTRAMUSCULAR; INTRAVENOUS; SUBCUTANEOUS
Refills: 0 | Status: DISCONTINUED | OUTPATIENT
Start: 2023-05-11 | End: 2023-05-12

## 2023-05-11 RX ORDER — DIVALPROEX SODIUM 500 MG/1
750 TABLET, DELAYED RELEASE ORAL EVERY 12 HOURS
Refills: 0 | Status: DISCONTINUED | OUTPATIENT
Start: 2023-05-11 | End: 2023-05-12

## 2023-05-11 RX ORDER — ACETAMINOPHEN 500 MG
650 TABLET ORAL EVERY 6 HOURS
Refills: 0 | Status: DISCONTINUED | OUTPATIENT
Start: 2023-05-11 | End: 2023-05-12

## 2023-05-11 RX ADMIN — Medication 400 MILLIGRAM(S): at 21:01

## 2023-05-11 RX ADMIN — SODIUM CHLORIDE 100 MILLILITER(S): 9 INJECTION INTRAMUSCULAR; INTRAVENOUS; SUBCUTANEOUS at 02:13

## 2023-05-11 RX ADMIN — PANTOPRAZOLE SODIUM 40 MILLIGRAM(S): 20 TABLET, DELAYED RELEASE ORAL at 12:00

## 2023-05-11 RX ADMIN — DIVALPROEX SODIUM 750 MILLIGRAM(S): 500 TABLET, DELAYED RELEASE ORAL at 23:11

## 2023-05-11 RX ADMIN — DIVALPROEX SODIUM 750 MILLIGRAM(S): 500 TABLET, DELAYED RELEASE ORAL at 12:00

## 2023-05-11 RX ADMIN — ONDANSETRON 4 MILLIGRAM(S): 8 TABLET, FILM COATED ORAL at 15:36

## 2023-05-11 NOTE — H&P ADULT - ASSESSMENT
IMP: 33 y/o F with cerebral AVM          Plan: Admit to Neurosurgery           Obtain EKG now          Keep NPO except meds         Start IVF at 100cc/hr at [facility]        [facility]         Angiogram in morning             Case discussed with Dr Borja

## 2023-05-11 NOTE — PROVIDER CONTACT NOTE (OTHER) - ACTION/TREATMENT ORDERED:
LUIS Oates aware, As per MD ok to hold. Will reschedule doses for pts preferred time. Safety and comfort maintained.
LUIS Oates aware. Safety and comfort maintained.

## 2023-05-11 NOTE — CHART NOTE - NSCHARTNOTEFT_GEN_A_CORE
The patient is scheduled for a diagnostic cerebral angiogram today with Dr. Borja. Please keep NPO except medication and on IV fluid prior to the procedure start time today.   [id number] Neuroendovascular with questions/ concerns.

## 2023-05-11 NOTE — ED PROVIDER NOTE - NS ED ROS FT
Constitutional: no fever, chills, no recent weight loss, change in appetite or malaise  Eyes: no redness/discharge/pain/vision changes  ENT: no rhinorrhea/ear pain/sore throat  Cardiac: No chest pain, SOB or edema.  Respiratory: No cough or respiratory distress  GI: No nausea, vomiting, diarrhea or abdominal pain.  : No dysuria, frequency, urgency or hematuria  MS: no pain to back or extremities, no loss of ROM, no weakness  Neuro: No weakness. No LOC.  Skin: No skin rash  Except as documented in the HPI, all other systems are negative.

## 2023-05-11 NOTE — ED PROVIDER NOTE - ATTENDING APP SHARED VISIT CONTRIBUTION OF CARE
pt with ha, hx of cerebral aneurysms, sent for pre op eval by neurosurg. pt is scheduled for a cerebral angiogram tomorrow. she has a mild ha at this time.

## 2023-05-11 NOTE — ED PROVIDER NOTE - CARE PLAN
1 Principal Discharge DX:	Headache  Assessment and plan of treatment:	ha  pre op and admit for angio

## 2023-05-11 NOTE — H&P ADULT - NSHPLABSRESULTS_GEN_ALL_CORE
13.7   12.71 )-----------( 438      ( 11 May 2023 00:35 )             41.6   05-11    142  |  103  |  14  ----------------------------<  132<H>  4.6   |  28  |  0.8    Ca    9.5      11 May 2023 00:35    TPro  6.6  /  Alb  4.0  /  TBili  <0.2  /  DBili  x   /  AST  35  /  ALT  50<H>  /  AlkPhos  83  05-11

## 2023-05-11 NOTE — ED ADULT NURSE NOTE - OBJECTIVE STATEMENT
Patient sent in by neurosurgeon Dr. Borja for evaluation and possible admission for headache and visual disturbances. Patient with known AVM with associated seizure disorder and requesting CT angiogram as per MD recommendations.

## 2023-05-11 NOTE — PROGRESS NOTE ADULT - SUBJECTIVE AND OBJECTIVE BOX
SUBJECTIVE:    Patient is a 32y old Female who presents with a chief complaint of AVM (11 May 2023 00:59)    Currently admitted to medicine with the primary diagnosis of Headache       Today is hospital day .     PAST MEDICAL & SURGICAL HISTORY  Bicornuate uterus    Migraine headache    TIA (transient ischemic attack)  [facility]-2022-inf lat visual field loss    PCOS (polycystic ovarian syndrome)    Obesity    Cerebrovascular dural AV fistula    H/O unilateral salpingectomy  gyn surgery-ectopic pregnancy   section  Cerebral angiogram      ALLERGIES:  No Known Allergies    MEDICATIONS:  STANDING MEDICATIONS  divalproex  milliGRAM(s) Oral every 12 hours  pantoprazole  Injectable 40 milliGRAM(s) IV Push daily  sodium chloride 0.9%. 1500 milliLiter(s) IV Continuous <Continuous>  sodium chloride 0.9%. 1000 milliLiter(s) IV Continuous <Continuous>    PRN MEDICATIONS  acetaminophen     Tablet .. 650 milliGRAM(s) Oral every 6 hours PRN  ondansetron Injectable 4 milliGRAM(s) IV Push every 6 hours PRN    VITALS:   T(F): 97  HR: 86  BP: 119/68  RR: 18  SpO2: 98%    LABS:                        13.7   12.71 )-----------( 438      ( 11 May 2023 00:35 )             41.6     05-11    142  |  103  |  14  ----------------------------<  132<H>  4.6   |  28  |  0.8    Ca    9.5      11 May 2023 00:35    TPro  6.6  /  Alb  4.0  /  TBili  <0.2  /  DBili  x   /  AST  35  /  ALT  50<H>  /  AlkPhos  83  05-11    PT/INR - ( 11 May 2023 00:35 )   PT: 11.00 sec;   INR: 0.97 ratio         PTT - ( 11 May 2023 00:35 )  PTT:31.2 sec              RADIOLOGY:    PHYSICAL EXAM:  GEN: No acute distress  LUNGS: Clear to auscultation bilaterally   HEART: S1/S2 present. RRR.   ABD/ GI: Soft, non-tender, non-distended. Bowel sounds present  EXT: NC/NC/NE/2+PP/BERUMEN  NEURO: AAOX3

## 2023-05-11 NOTE — BRIEF OPERATIVE NOTE - NSICDXBRIEFPROCEDURE_GEN_ALL_CORE_FT
Patient was switched from norvasc 5 mg to propranolol 80 mg daily. bp 132/84.    Patient informed to continue with propanolol. Patient also informed to schedule for CPE and mammogram, before any refills are needed on medications.   PROCEDURES:  Diagnostic cerebral arteriography 11-May-2023 22:42:58  Lizbeth Nino

## 2023-05-11 NOTE — PRE PROCEDURE NOTE - PRE PROCEDURE EVALUATION
Interventional Neuro Radiology  Pre-Procedure Note PA-C    HPI: The patient is a 32-year-old, RH female with a past history of opthalmic migraines, pre-eclampsia, PCOS, right occipital IPH noted during headache workup in  s/p 2 diagnostic cerebral angiograms with Dr. Castro on  and 22 on which eary shunting of the right parieto occipital artery was noted. The patient is s/p AV fistula embolization with Dr. Gonzalez 22 and now presents for a diagnostic cerebral angiogram with Dr. Borja.    Allergies: No Known Allergies    PAST MEDICAL & SURGICAL HISTORY:  Bicornuate uterus  Migraine headache    TIA (transient ischemic attack)  [facility]-2022-inf lat visual field loss  PCOS (polycystic ovarian syndrome)  Obesity  Cerebrovascular dural AV fistula  H/O unilateral salpingectomy  gyn surgery-ectopic pregnancy   section  Cerebral angiogram  Family history of systemic lupus erythematosus (SLE) in mother (Mother)  Family history of hemophilia A (Sibling)  Family history of antiphospholipid syndrome (Mother)    Current Medications: acetaminophen     Tablet .. 650 milliGRAM(s) Oral every 6 hours PRN  divalproex  milliGRAM(s) Oral every 12 hours  ondansetron Injectable 4 milliGRAM(s) IV Push every 6 hours PRN  pantoprazole  Injectable 40 milliGRAM(s) IV Push daily  sodium chloride 0.9%. 1500 milliLiter(s) IV Continuous <Continuous>  sodium chloride 0.9%. 1000 milliLiter(s) IV Continuous <Continuous>    Labs:                         13.7   12.71 )-----------( 438      ( 11 May 2023 00:35 )             41.6           142  |  103  |  14  ----------------------------<  132<H>  4.6   |  28  |  0.8    Ca    9.5      11 May 2023 00:35    TPro  6.6  /  Alb  4.0  /  TBili  <0.2  /  DBili  x   /  AST  35  /  ALT  50<H>  /  AlkPhos  83      Blood Bank: 23  --  A NEG  --    Assessment/Plan:   This is a 31 yo female who presents for a diagnostic cerebral angiogram.   Procedure, goals, risks, benefits and alternatives  were discussed with patient and patient's family.  All questions were answered to best understanding.   Risks discussed include but are not limited to stroke, vessel injury, hemorrhage, and/or hematoma.  Patient demonstrates understanding of all risks involved with this procedure and wishes to continue.     Appropriate consent was obtained from patient and consent is in the patient's chart.

## 2023-05-11 NOTE — H&P ADULT - NSICDXPASTMEDICALHX_GEN_ALL_CORE_FT
PAST MEDICAL HISTORY:  Bicornuate uterus     Cerebrovascular dural AV fistula     Migraine headache     Obesity     PCOS (polycystic ovarian syndrome)     TIA (transient ischemic attack) [facility]-4/2022-inf lat visual field loss

## 2023-05-11 NOTE — PROVIDER CONTACT NOTE (OTHER) - SITUATION
pt due for 6am Depakote. asper pt she took her last dose at 11pm 5/10 and does not want to take this dose early. pt takes doses at 11am and 11pm at home
pt complains of a headache, pt requesting IV Tylenol

## 2023-05-11 NOTE — PATIENT PROFILE ADULT - OVER THE PAST TWO WEEKS, HAVE YOU FELT LITTLE INTEREST OR PLEASURE IN DOING THINGS?
I have reviewed discharge instructions with the patient. The patient verbalized understanding. Patient left ED via Discharge Method: ambulatory to Home with self. Opportunity for questions and clarification provided. Patient given 0 scripts. To continue your aftercare when you leave the hospital, you may receive an automated call from our care team to check in on how you are doing. This is a free service and part of our promise to provide the best care and service to meet your aftercare needs.  If you have questions, or wish to unsubscribe from this service please call [phone]. Thank you for Choosing our [facility] Emergency Department.
Pt has finished contrast and CT notified.
Report received from [facility]  

no

## 2023-05-11 NOTE — ED PROVIDER NOTE - OBJECTIVE STATEMENT
pt with pmhx ophthalmic migraines, pre-eclampsia, PCOS, right occipital IPH found during headache workup in 2022, now s/p 2 diagnostic cerebral angiograms with Dr Castro (4/28/22 and 6/16/22 )which revealed early shunting of the right parieto-occipital artery which appeared worse compared to prior angiogram. Patient then reported to multiple neuroendovascular providers seeking second opinions, established care with Dr. Gonzalez and underwent cerebral angiogram and  embolization of pial AV fistula 8/29/2022. Patient has not since reported for follow up diagnostic cerebral angiogram despite education regarding standard of  care to obtain follow up after 6 months. Patient was seen outpatient by Dr. Malik for concerns of frequent visual auras of color and shapes with  headache ? per neurology Depakote increased to 750 mg BID still with sporadic episodes. Patient presented today to Dr. Alford office as an outpatient hoping to re-establish care at [facility] and undergo a formal diagnostic cerebral angiogram as a follow up. Patient was advised by Dr. Borja to present to the emergency room for pre-op and evaluation for potential angiogram tomorrow AM. Denies fever/chill/dizziness/chest pain/palpitation/sob/abd pain/n/v/d/ black stool/bloody stool/urinary sxs

## 2023-05-11 NOTE — H&P ADULT - NSHPPHYSICALEXAM_GEN_ALL_CORE
Awake alert Oriented x 3  PERRLA EOMI Smile intact no facial noted   Tongue midline  Motor: BERUMEN X 4 5/5  Sensory: grossly intact to light touch

## 2023-05-11 NOTE — H&P ADULT - NSHPREVIEWOFSYSTEMS_GEN_ALL_CORE
Patient is a 32 year old right handed female with a PMhx of ophthalmic  migraines, pre-eclampsia, PCOS, right occipital IPH found during headache  workup in 2022, now s/p 2 diagnostic cerebral angiograms with Dr Castro  4/28/22 and 6/16/22 which revealed early shunting of the right parieto  occipital artery which appeared worse compared to prior angiogram. Patient then  reported to multiple neuroendovascular providers seeking second opinions,  established care with Dr. Gonzalez and underwent cerebral angiogram and  embolization of pial AV fistula 8/29/2022. Patient has not since reported for  follow up diagnostic cerebal angiogram despite education regarding standard of  care to obtain follow up after 6 months. Patient was seen outpatient by Dr. Malik for concerns of frequent visual auras of color and shapes with  headache ? per neurology Depakote increased to 750 mg BID still with sporadic  episodes.     Patient presented today to Dr. Alford office as an outpatient hoping to  re-establish care at [facility] and undergo a formal diagnostic cerebral angiogram as  a follow up. Patient was advised by Dr. Borja to present to the emergency room  for pre-op and evaluation for potential angiogram tomorrow AM.  Pt present to  ED for workup and add on to schedule for angiogram tomorrow.

## 2023-05-11 NOTE — ED PROVIDER NOTE - PHYSICAL EXAMINATION
CONSTITUTIONAL: Well-appearing; well-nourished; in no apparent distress.   EYES: PERRL; EOM intact.   NECK: Supple; non-tender; no cervical lymphadenopathy.   CARDIOVASCULAR: Normal S1, S2; no murmurs, rubs, or gallops.   RESPIRATORY: Normal chest excursion with respiration; breath sounds clear and equal bilaterally; no wheezes, rhonchi, or rales.  GI/: Non-distended; non-tender; no palpable organomegaly.   MS: No evidence of trauma or deformity. No CVA tenderness. Normal ROM in all four extremities; non-tender to palpation; distal pulses are normal.   SKIN: Normal for age and race; warm; dry; good turgor; no apparent lesions or exudate.   NEURO/PSYCH: A & O x 4; grossly unremarkable. mood and manner are appropriate.

## 2023-05-11 NOTE — ED PROVIDER NOTE - NSICDXPASTMEDICALHX_GEN_ALL_CORE_FT
PAST MEDICAL HISTORY:  Bicornuate uterus     Cerebrovascular dural AV fistula     Migraine headache     Obesity     PCOS (polycystic ovarian syndrome)     TIA (transient ischemic attack) [facility]-4/2022-inf lat visual field loss

## 2023-05-11 NOTE — PROGRESS NOTE ADULT - ASSESSMENT
32 year old right handed female with a PMhx of ophthalmic  migraines, pre-eclampsia, PCOS, right occipital IPH found during headache  workup in 2022, now s/p 2 diagnostic cerebral angiograms with Dr Castro  4/28/22 and 6/16/22 which revealed early shunting of the right parieto  occipital artery which appeared worse compared to prior angiogram. Patient then  reported to multiple neuroendovascular providers seeking second opinions,  established care with Dr. Gonzalez and underwent cerebral angiogram and  embolization of pial AV fistula 8/29/2022. Patient has not since reported for  follow up diagnostic cerebal angiogram despite education regarding standard of  care to obtain follow up after 6 months. Patient was seen outpatient by Dr. Malik for concerns of frequent visual auras of color and shapes with  headache ? per neurology Depakote increased to 750 mg BID still with sporadic  episodes.     Patient presented today to Dr. Alford office as an outpatient hoping to  re-establish care at [facility] and undergo a formal diagnostic cerebral angiogram as  a follow up. Patient was advised by Dr. Borja to present to the emergency room  for pre-op and evaluation for potential angiogram.  Pt present to  ED for workup and add on to schedule for angiogram today    # occipital headache --CT angio head-- no stenosis or malformation-- seen by neurosurgeon--pending procedure today

## 2023-05-11 NOTE — BRIEF OPERATIVE NOTE - OPERATION/FINDINGS
Procedure : Diagnostic Cerebral Angiogram     Amount and type of contrast: Visipaque 320   Medications given during procedure: Verapamil 2.5 mg IA, Heparin 3000 IU IA, Nitroglycerin 200 mcg IA, see also anesthesiology note  Implants placed: None  Complications: None    Post-procedure exam:   NIHSS: 0  Extremity: RUE c/d/i with +mild swelling proximal to site/ mild tenderness to palpation, no induration. Radial and brachial pulses palpated bilaterally, temperature and color without discrepancies bilaterally.   Suggestions :   Patient is to return to floor in 2 hours  Please follow post NI orders for neuro checks, distal pulses, vitals, and arm checks placed for total of 2 hour recovery period following procedure.   SBP<150  Patient should f/u outpatient in clinic with Dr. Borja within 1-2 weeks of discharge  Please notify provider with any signs of bleeding or hematoma at right arm site, change in mental status, vitals outside parameters, or absent pulses.   Management per primary team    Preliminary Findings: The patient is s/p diagnostic cerebral angiogram on which possible recurrence of the right pial AVF was noted. Pending further review of imaging and final IR Neuro radiology report.

## 2023-05-12 ENCOUNTER — TRANSCRIPTION ENCOUNTER (OUTPATIENT)
Age: 33
End: 2023-05-12

## 2023-05-12 VITALS
TEMPERATURE: 98 F | RESPIRATION RATE: 18 BRPM | DIASTOLIC BLOOD PRESSURE: 65 MMHG | SYSTOLIC BLOOD PRESSURE: 112 MMHG | HEART RATE: 82 BPM

## 2023-05-12 PROCEDURE — 99222 1ST HOSP IP/OBS MODERATE 55: CPT

## 2023-05-12 RX ORDER — ACETAMINOPHEN 500 MG
1000 TABLET ORAL ONCE
Refills: 0 | Status: COMPLETED | OUTPATIENT
Start: 2023-05-12 | End: 2023-05-12

## 2023-05-12 RX ORDER — DIVALPROEX SODIUM 500 MG/1
3 TABLET, DELAYED RELEASE ORAL
Qty: 0 | Refills: 0 | DISCHARGE
Start: 2023-05-12

## 2023-05-12 RX ADMIN — PANTOPRAZOLE SODIUM 40 MILLIGRAM(S): 20 TABLET, DELAYED RELEASE ORAL at 12:31

## 2023-05-12 RX ADMIN — Medication 400 MILLIGRAM(S): at 08:46

## 2023-05-12 RX ADMIN — DIVALPROEX SODIUM 750 MILLIGRAM(S): 500 TABLET, DELAYED RELEASE ORAL at 12:29

## 2023-05-12 RX ADMIN — Medication 1000 MILLIGRAM(S): at 09:00

## 2023-05-12 NOTE — PHYSICAL THERAPY INITIAL EVALUATION ADULT - GENERAL OBSERVATIONS, REHAB EVAL
10:30-11:00 Pt. encountered in semifowler in bed in NAD. C/o migraine headache, but agreeable to PT. Sabiha SONG aware.

## 2023-05-12 NOTE — PHYSICAL THERAPY INITIAL EVALUATION ADULT - PASSIVE RANGE OF MOTION EXAMINATION, REHAB EVAL
bilateral upper extremity Passive ROM was WFL (within functional limits)/bilateral lower extremity Passive ROM was WFL (within functional limits)
Flakita Sunshine

## 2023-05-12 NOTE — DISCHARGE NOTE PROVIDER - NSDCCPCAREPLAN_GEN_ALL_CORE_FT
PRINCIPAL DISCHARGE DIAGNOSIS  Diagnosis: AVF (arteriovenous fistula)  Assessment and Plan of Treatment:

## 2023-05-12 NOTE — CHART NOTE - NSCHARTNOTEFT_GEN_A_CORE
Patient cleared from medical / neurologic perspective for discharge home.   No identified PT needs for home discharge.     Will follow up with patient in 1 week for discussion regarding AVF management for residual fistula seen on angiogram. Discussed patient's angiogram results at length with pt and her mother over phone. Pt understands plan moving forward ie. surgical planning vs continued workup outpatient.     Per Dr. Borja continue to plan for  discharge today with planned outpatient appointment.     [id number] neuroendovascular Patient cleared from medical / neurologic perspective for discharge home.   No identified PT needs for home discharge.     Will follow up with patient in 1 week for discussion regarding AVF management for residual fistula seen on angiogram. Discussed patient's angiogram results at length with pt and her mother over phone. Pt understands plan moving forward ie. surgical planning vs radiation therapy vs continued workup outpatient. Patient's case to be discussed in neuroendovascular conference and to be discussed with Odilon Pérez conference and providers.     Per Dr. Borja continue to plan for  discharge today with planned outpatient appointment.     [id number] neuroendovascular

## 2023-05-12 NOTE — PHYSICAL THERAPY INITIAL EVALUATION ADULT - PERTINENT HX OF CURRENT PROBLEM, REHAB EVAL
Patient is a 32 year old right handed female with a PMhx of ophthalmic migraines, pre-eclampsia, PCOS, right occipital IPH found during headache workup in 2022, now s/p 2 diagnostic cerebral angiograms with Dr Castro 4/28/22 and 6/16/22.    Patient presented today to Dr. Alford office as an outpatient hoping to re-establish care at [facility] and undergo a formal diagnostic cerebral angiogram as a follow up. Patient was advised by Dr. Borja to present to the emergency room for pre-op and evaluation for potential angiogram tomorrow AM.  Pt present to ED for workup and add on to schedule for angiogram tomorrow.

## 2023-05-12 NOTE — DISCHARGE NOTE PROVIDER - HOSPITAL COURSE
lula is a 32 year old right handed female with a PMhx of ophthalmic  migraines, pre-eclampsia, PCOS, right occipital IPH found during headache  workup in 2022, now s/p 2 diagnostic cerebral angiograms with Dr Castro  4/28/22 and 6/16/22 which revealed early shunting of the right parieto  occipital artery which appeared worse compared to prior angiogram. Patient then  reported to multiple neuroendovascular providers seeking second opinions,  established care with Dr. Gonzalez and underwent cerebral angiogram and  embolization of pial AV fistula 8/29/2022. Patient has not since reported for  follow up diagnostic cerebal angiogram despite education regarding standard of  care to obtain follow up after 6 months. Patient was seen outpatient by Dr. Malik for concerns of frequent visual auras of color and shapes with  headache ? per neurology Depakote increased to 750 mg BID still with sporadic  episodes.  Patient is s/p diagnostic cerebral angiogram POD 1 with Dr. Borja which revealed potential recurrence of AVF. Patient will be discussed in neuroendovascular conference this coming Monday for further discussion on potential treatment plans. Patient underwent extensive conversation post-angiogram on procedure day with Dr. Borja regarding results. Patient this AM c/o headache, PRN relief. Seen by neurology team for HA assessment who recommended continuing home medications and f/u with Dr. Blackwood as an outpatient. Patient this AM was cleared by PT without identified home needs. Social work aware of patient and pending dispo home today.

## 2023-05-12 NOTE — CONSULT NOTE ADULT - SUBJECTIVE AND OBJECTIVE BOX
Neurology Consult    Patient is a 32F with PMH of migraine and AVF, who is currently admitted to neuroendovascular service for angiogram. Neurology was consulted for migraine. Patient is well known and follows with Dr. Malik, whom she saw last week for migraine follow up. She is currently managing with Depakote 750mg BID and Ubrelvy 50mg as rescue. She reports that her pain is currently better controlled.      PAST MEDICAL & SURGICAL HISTORY:  Bicornuate uterus      Migraine headache      TIA (transient ischemic attack)  [facility]-2022-inf lat visual field loss      PCOS (polycystic ovarian syndrome)      Obesity      Cerebrovascular dural AV fistula      H/O unilateral salpingectomy  gyn surgery-ectopic pregnancy   section  Cerebral angiogram          FAMILY HISTORY:  Family history of systemic lupus erythematosus (SLE) in mother (Mother)    Family history of hemophilia A (Sibling)    Family history of antiphospholipid syndrome (Mother)        Social History: (-) x 3    Allergies    No Known Allergies    Intolerances        MEDICATIONS  (STANDING):  divalproex  milliGRAM(s) Oral every 12 hours  pantoprazole  Injectable 40 milliGRAM(s) IV Push daily    MEDICATIONS  (PRN):  acetaminophen     Tablet .. 650 milliGRAM(s) Oral every 6 hours PRN Temp greater or equal to 38.5C (101.3F), Mild Pain (1 - 3)  ondansetron Injectable 4 milliGRAM(s) IV Push every 6 hours PRN Nausea and/or Vomiting      Review of systems:    Constitutional: as per HPI  Eyes: No eye pain or discharge  ENMT:  No difficulty hearing; No sinus or throat pain  Neck: No pain or stiffness  Respiratory: No cough, wheezing, chills or hemoptysis  Cardiovascular: No chest pain, palpitations, shortness of breath, dyspnea on exertion  Gastrointestinal: No abdominal pain, nausea, vomiting or hematemesis; No diarrhea or constipation.   Genitourinary: No dysuria, frequency, hematuria or incontinence  Neurological: As per HPI  Skin: No rashes or lesions   Endocrine: No heat or cold intolerance; No hair loss  Musculoskeletal: No joint pain or swelling  Psychiatric: No depression, anxiety, mood swings  Heme/Lymph: No easy bruising or bleeding gums    Vital Signs Last 24 Hrs  T(C): 36.4 (12 May 2023 04:54), Max: 36.7 (11 May 2023 19:30)  T(F): 97.5 (12 May 2023 04:54), Max: 98.1 (11 May 2023 19:30)  HR: 90 (12 May 2023 04:54) (85 - 96)  BP: 120/65 (12 May 2023 04:54) (109/59 - 129/68)  BP(mean): 85 (12 May 2023 04:54) (82 - 85)  RR: 18 (12 May 2023 04:54) (17 - 18)  SpO2: 98% (11 May 2023 19:15) (96% - 98%)    Parameters below as of 12 May 2023 04:54  Patient On (Oxygen Delivery Method): room air          Neurological Examination:  NEUROLOGICAL EXAMINATION  General:  Appearance is consistent with chronologic age.  No abnormal facies.  Gross skin survey within normal limits.    Cognitive/Language:  Awake, alert, and oriented to person, place, time and date.  Nondysarthric.    Cranial Nerves  - Eyes:  Visual fields full.  EOMI w/o nystagmus, skew or reported double vision. No ptosis/weakness of eyelid closure.    - Face:  Facial sensation normal V1 - 3, no facial asymmetry.    - Ears/Nose/Throat:  Hearing grossly intact  Motor examination:  Upper Extremities: L 5/5, R 5/5; Lower extremities: L 5/5, R 5/5  Sensory examination:   Intact to light touch throughout        Labs:   CBC Full  -  ( 11 May 2023 00:35 )  WBC Count : 12.71 K/uL  RBC Count : 5.03 M/uL  Hemoglobin : 13.7 g/dL  Hematocrit : 41.6 %  Platelet Count - Automated : 438 K/uL  Mean Cell Volume : 82.7 fL  Mean Cell Hemoglobin : 27.2 pg  Mean Cell Hemoglobin Concentration : 32.9 g/dL  Auto Neutrophil # : 8.51 K/uL  Auto Lymphocyte # : 2.91 K/uL  Auto Monocyte # : 0.82 K/uL  Auto Eosinophil # : 0.36 K/uL  Auto Basophil # : 0.06 K/uL  Auto Neutrophil % : 66.9 %  Auto Lymphocyte % : 22.9 %  Auto Monocyte % : 6.5 %  Auto Eosinophil % : 2.8 %  Auto Basophil % : 0.5 %    -11    142  |  103  |  14  ----------------------------<  132<H>  4.6   |  28  |  0.8    Ca    9.5      11 May 2023 00:35    TPro  6.6  /  Alb  4.0  /  TBili  <0.2  /  DBili  x   /  AST  35  /  ALT  50<H>  /  AlkPhos  83  05-11    LIVER FUNCTIONS - ( 11 May 2023 00:35 )  Alb: 4.0 g/dL / Pro: 6.6 g/dL / ALK PHOS: 83 U/L / ALT: 50 U/L / AST: 35 U/L / GGT: x           PT/INR - ( 11 May 2023 00:35 )   PT: 11.00 sec;   INR: 0.97 ratio         PTT - ( 11 May 2023 00:35 )  PTT:31.2 sec        Neuroimaging:  NCT:     23 @ 12:40

## 2023-05-12 NOTE — DISCHARGE NOTE PROVIDER - CARE PROVIDER_API CALL
Jorge Borja; [facility])  Surgery  Neurosurgery  [address] [place] [address]  Phone: [phone]  Fax: [phone]  Established Patient  Follow Up Time: 1 week    Shoaib Garcia)  EEGEpilepsy; Neurology  [address] [place] [address], [address]  [place] [address]  Phone: [phone]  Fax: [phone]  Established Patient  Follow Up Time: 2 weeks

## 2023-05-12 NOTE — DISCHARGE NOTE PROVIDER - NSDCCPTREATMENT_GEN_ALL_CORE_FT
PRINCIPAL PROCEDURE  Procedure: Angiogram, carotid and cerebral, bilateral  Findings and Treatment:

## 2023-05-12 NOTE — CHART NOTE - NSCHARTNOTEFT_GEN_A_CORE
Neurosurgery note    Pt c/o headache this am, neurologically unchanged from baseline- neurologically intact. Pt c/o R wrist tenderness as r radial artery used for access. IV tylenol given with good effect previously ( earlier in night).      Awake alert oriented x 3  PERRLA EOMI  BERUMEN X 4  Sensory: grossly intact to light touch  R wrist tender to palpation + warm to touch +pink - Can open and closed hand w/o difficulty    Imp; s/p Diagnostic angiogram for cerebral AVM  Plan: IV tylenol for HA         Ice Pack for right wrist         Advance diet if able to tolerate        continue Depakote..

## 2023-05-12 NOTE — PHYSICAL THERAPY INITIAL EVALUATION ADULT - NSPTDISCHREC_GEN_A_CORE
Home. D/c PT. Pt. independent with functional mobility. No need for acute skilled PT at this time. Please re-consult if any change in functional status./No skilled PT needs

## 2023-05-12 NOTE — DISCHARGE NOTE PROVIDER - NSDCMRMEDTOKEN_GEN_ALL_CORE_FT
Depakote 250 mg oral delayed release tablet: 1 tab(s) orally 2 times a day  divalproex sodium 250 mg oral delayed release tablet: 3 tab(s) orally once a day (at bedtime)

## 2023-05-12 NOTE — PHYSICAL THERAPY INITIAL EVALUATION ADULT - ADDITIONAL COMMENTS
Pt. lives in private home with  and 5 y.o. daughter with no steps to enter and 12 steps inside. Denies use of DME or AD PTA. Independent at baseline. 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 (-) Driving.

## 2023-05-12 NOTE — CONSULT NOTE ADULT - ASSESSMENT
32F with PMH of migraine and AVF, who is currently admitted to neuroendovascular service for angiogram. Neurology was consulted for migraine. Patient is well known and follows with Dr. Malik, whom she saw last week for migraine follow up. She is currently managing with Depakote 750mg BID and Ubrelvy 50mg as rescue. She reports that her pain is currently better controlled.    Recommendations  - Continue Depakote 750mg BID  - Continue Ubrelvy 50mg for rescue  - Follow up outpatient with Dr. Malik

## 2023-05-12 NOTE — DISCHARGE NOTE PROVIDER - PROVIDER TOKENS
PROVIDER:[TOKEN:[[id number]],FOLLOWUP:[1 week],ESTABLISHEDPATIENT:[T]],PROVIDER:[TOKEN:[[id number]],FOLLOWUP:[2 weeks],ESTABLISHEDPATIENT:[T]]

## 2023-05-12 NOTE — DISCHARGE NOTE NURSING/CASE MANAGEMENT/SOCIAL WORK - PATIENT PORTAL LINK FT
You can access the FollowMyHealth Patient Portal offered by [facility] by registering at the following website: [email or web address]. By joining [employer]’s FollowMyHealth portal, you will also be able to view your health information using other applications (apps) compatible with our system.

## 2023-05-12 NOTE — DISCHARGE NOTE PROVIDER - NSDCFUSCHEDAPPT_GEN_ALL_CORE_FT
Lamin Ochoa  [facility] PreAdmits  Scheduled Appointment: 05/16/2023    Jorge Borja  [facility] PreAdmits  Scheduled Appointment: 05/16/2023    [facility]  INTERVEN SI [facility]  Scheduled Appointment: 05/16/2023    Shoaib Malik  [facility]  NEUROLOGY [address]  Scheduled Appointment: 06/21/2023

## 2023-05-15 ENCOUNTER — NON-APPOINTMENT (OUTPATIENT)
Age: 33
End: 2023-05-15

## 2023-05-16 ENCOUNTER — APPOINTMENT (OUTPATIENT)
Dept: NEUROSURGERY | Facility: HOSPITAL | Age: 33
End: 2023-05-16

## 2023-05-16 NOTE — HISTORY OF PRESENT ILLNESS
[FreeTextEntry1] : 32-year-old female presents back to the neurosurgery office today as a follow-up to discuss proceeding with a diagnostic cerebral angiogram, previously discussed at last office visit. \par \par Patient has been going back and forth between neurosurgeons at [facility] for consultations but clearly states that she wants to stay with care at [facility]. She was first seen in our office in January and went to preadmission testing but never proceeded with a diagnostic cerebral angiogram the day of the actual procedure.  She admits that her anxiety got the best of her and she later went to another consultation with Dr. Ochoa in March, who also stated that a diagnostic cerebral angiogram was warranted.  However she presents today admitting that she no longer wants to seek other opinions and wishes to proceed. \par \par Ms. Benítez, of note, has a history of having had two diagnostic cerebral angiograms with Dr. Castro, one on 4/28/22 and one on 6/16/22. The repeat angiogram in 6/2022 showed small early shunting of the right parieto-occipital artery which seemed to be worse in comparison to the previous cerebral DSA. It has been mentioned prior that the pial AV fistula could be reasoning for her IPH. The patient then underwent an uneventful and successful cerebral angiogram and embolization of pial AV fistula with Dr. Gonzalez on 8/29/22. \par \par It was emphasized to the patient at the last office visit here in January that after an embolization of an AVF, the standard of care is to perform a diagnostic cerebral angiogram which is why this was recommended at that time.  She is still experiencing the visual disturbances on a daily basis, flashing lights, to the emergency department as a result approximately 2 weeks ago.  Some of the episodes may be attributed to anxiety.  Seizures can also be an attribute. Patient recently consulted with Dr. Malik who is managing her Depakote. \par \par All in all it was discussed today proceeding with a diagnostic cerebral angiogram.  The patient has been reassured that this is the next best step in her plan of care.  She has agreed to this. The risks of therapy were discussed including but not limited to death, stroke, bleeding, access site complications, vessel perforation and the need for surveillance. The patient wishes to proceed and we will schedule her accordingly.

## 2023-05-16 NOTE — END OF VISIT
[FreeTextEntry3] : I reviewed this lady today in the neurosurgery clinic together with her mother.  She was tearful and had contacted our office a number of times over the past couple weeks requesting a consultation, even though we had been under the impression that she was continuing her care at [facility] under the care of Dr. Galindo and Shaka.  Her main complaints related to ongoing headaches as well as visual auras and significant anxiety related to the fact that she will have another intracerebral hemorrhage and that her AVM was not completely resolved.  I encouraged her to consider continuing  her care at [facility], but she was quite clear that she wished to have her care more locally.

## 2023-05-16 NOTE — ADDENDUM
[FreeTextEntry1] : 5/10/2023 3:30pm the patient is going to go through the ER for pretesting in the hopes of having the Diagnostic Cerebral Angiogram performed tomorrow, an option offered by Dr. Borja in order to best ease the patient's anxiety and provide the best quality care; the ACP has made the inpatient IR team aware

## 2023-05-16 NOTE — REVIEW OF SYSTEMS
[As Noted in HPI] : as noted in HPI [Negative] : Neurological [Difficulty Walking] : no difficulty walking [Frequent Falls] : not falling [Shortness Of Breath] : no shortness of breath [Incontinence] : no incontinence [de-identified] : see HPI

## 2023-05-17 ENCOUNTER — APPOINTMENT (OUTPATIENT)
Dept: NEUROSURGERY | Facility: CLINIC | Age: 33
End: 2023-05-17
Payer: MEDICAID

## 2023-05-17 PROCEDURE — 99024 POSTOP FOLLOW-UP VISIT: CPT

## 2023-05-17 NOTE — HISTORY OF PRESENT ILLNESS
[FreeTextEntry1] : 32-year-old female presents to the neurosurgery office today as a follow-up alongside her mother to discuss results of angiogram on 5/11/2023. \par \par Ms. Benítez also had her  on the phone. All were educated that the Diagnostic Cerebral Angiogram demonstrated a recurrence of the right pial AVF. It appears that it may not be the exact same location as the last, but there is an area of suspicion. \par \par The right wrist puncture site was assessed and was negative for any drainage or dehiscence, no hematoma or pseudoaneurysm noted, was clean, dry, and intact. Patient complained of mild RUE discomfort but was educated to keep full mobility and that it would resolve. \par \par It was discussed that after glue is placed, an AVF can reoccur or activate once more. Her visual deficits may be a result of the original embolization, area of stroke around the cavity. The open surgical intervention, if performed, would be on the surface and could potentially remove the cavity lining and as a result help with the seizures. She remains on Depakote as prescribed by Dr. Hanna. \par \par At this point there is not 100% certainty if a surgical intervention is warranted, but rather possibly perform a repeat 3-month follow-up Diagnostic Cerebral Angiogram instead. The glue is in place sealing the AVF at this time. The patient has given permission to discuss her case at the next [facility] Neurovascular/Neuroendovascular Conference after which we can reconvene to delineate a further plan. \par \par No questions were left unanswered, patient was satisfied.

## 2023-05-22 LAB — VALPROATE SERPL-MCNC: 86 UG/ML

## 2023-05-25 DIAGNOSIS — I67.1 CEREBRAL ANEURYSM, NONRUPTURED: ICD-10-CM

## 2023-05-25 DIAGNOSIS — Q51.3 BICORNATE UTERUS: ICD-10-CM

## 2023-05-25 DIAGNOSIS — Z90.79 ACQUIRED ABSENCE OF OTHER GENITAL ORGAN(S): ICD-10-CM

## 2023-05-25 DIAGNOSIS — I69.398 OTHER SEQUELAE OF CEREBRAL INFARCTION: ICD-10-CM

## 2023-05-25 DIAGNOSIS — H54.7 UNSPECIFIED VISUAL LOSS: ICD-10-CM

## 2023-05-25 DIAGNOSIS — E28.2 POLYCYSTIC OVARIAN SYNDROME: ICD-10-CM

## 2023-05-25 DIAGNOSIS — G43.B0 OPHTHALMOPLEGIC MIGRAINE, NOT INTRACTABLE: ICD-10-CM

## 2023-05-25 DIAGNOSIS — E66.9 OBESITY, UNSPECIFIED: ICD-10-CM

## 2023-06-02 ENCOUNTER — APPOINTMENT (OUTPATIENT)
Dept: NEUROSURGERY | Facility: CLINIC | Age: 33
End: 2023-06-02
Payer: MEDICAID

## 2023-06-02 VITALS — HEIGHT: 62 IN | WEIGHT: 200 LBS | BODY MASS INDEX: 36.8 KG/M2

## 2023-06-02 PROCEDURE — 99214 OFFICE O/P EST MOD 30 MIN: CPT

## 2023-06-20 LAB
ALBUMIN SERPL ELPH-MCNC: 4 G/DL
ALP BLD-CCNC: 81 U/L
ALT SERPL-CCNC: 57 U/L
ANION GAP SERPL CALC-SCNC: 12 MMOL/L
AST SERPL-CCNC: 35 U/L
BILIRUB SERPL-MCNC: 0.3 MG/DL
BUN SERPL-MCNC: 12 MG/DL
CALCIUM SERPL-MCNC: 9.2 MG/DL
CHLORIDE SERPL-SCNC: 104 MMOL/L
CO2 SERPL-SCNC: 25 MMOL/L
CREAT SERPL-MCNC: 0.7 MG/DL
EGFR: 116 ML/MIN/1.73M2
GLUCOSE SERPL-MCNC: 105 MG/DL
POTASSIUM SERPL-SCNC: 4.7 MMOL/L
PROT SERPL-MCNC: 6.4 G/DL
SODIUM SERPL-SCNC: 141 MMOL/L
VALPROATE SERPL-MCNC: 65 UG/ML

## 2023-06-21 ENCOUNTER — APPOINTMENT (OUTPATIENT)
Dept: NEUROLOGY | Facility: CLINIC | Age: 33
End: 2023-06-21
Payer: MEDICAID

## 2023-06-21 PROCEDURE — 99213 OFFICE O/P EST LOW 20 MIN: CPT | Mod: 95

## 2023-06-21 NOTE — ASSESSMENT
[FreeTextEntry1] : Patient is 33 yo RH woman, never smoker, with FMHx of Hemophilia A and PMHx of ophthalmic migraines, pre-eclampsia (normotensive afterwards w/o meds) and PCOS with finding of R occipital IPH found during work up for severe unusual HA in 4/2022. She has had 2 diagnostic cerebral angiograms with Dr. Castro, one on 4/28/22 and one on 6/16/22. The repeat angiogram in 6/2022 showed small early shunting of the right parieto-occipital artery which seemed WORSE in comparison to the previous cerebral DSA. 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 Gonzalez on 8/29/22 She is scheduled for DSA with Dr. Borja next week.  NO seizure like episodes and mood is well controlled.\par \par PLAN:\par 1. Continue depakote ER 750mg BID\par 2. f/u with DSA next week\par 3. Monitor BP at home\par 4. Ubrelvy PRN for migraines\par 5. f/u in 3-6 months\par 6. Discussed reasons to [facility]\par \par \par \par \par

## 2023-06-21 NOTE — HISTORY OF PRESENT ILLNESS
[Home] : at home, [unfilled] , at the time of the visit. [Medical Office: ([facility])___] : at the medical office located in  [Verbal consent obtained from patient] : the patient, [unfilled] [FreeTextEntry1] : Patient is 31 yo RH woman, never smoker, with FMHx of Hemophilia A and PMHx of ophthalmic migraines, pre-eclampsia (normotensive afterwards w/o meds) and PCOS with finding of R occipital IPH found during work up for severe unusual HA in 4/2022. She has 2 diagnostic cerebral angiograms with Dr. Castro, one on 4/28/22 and one on 6/16/22. The repeat angiogram in 6/2022 showed small early shunting of the right parieto-occipital artery which seemed WORSE in comparison to the previous cerebral DSA. 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 Gonzalez on 8/29/22 for which she presents today in our [facility] for f/u. She was cleared by Dr. Galindo in follow up in 9/2022 to return to her previous quality of life.  \par \par Today, she is being seen for followup and has had no seizure like episodes of visual auras since May 2023.  She is scheduled to have a DSA next week with Dr. Borja and possibly embolization vs Gamma knife treatment of her AVM.\par She is anxious but overall feels much better and her mood has been good.

## 2023-06-21 NOTE — PHYSICAL EXAM
[FreeTextEntry1] : a+ox3 language and attention normal\par No facial, movements symmetric\par \par \par mRS: 0 No symptoms at all \par \par \par

## 2023-06-22 ENCOUNTER — OUTPATIENT (OUTPATIENT)
Dept: OUTPATIENT SERVICES | Facility: HOSPITAL | Age: 33
LOS: 1 days | End: 2023-06-22
Payer: MEDICAID

## 2023-06-22 VITALS
RESPIRATION RATE: 18 BRPM | TEMPERATURE: 97 F | WEIGHT: 227.08 LBS | SYSTOLIC BLOOD PRESSURE: 135 MMHG | DIASTOLIC BLOOD PRESSURE: 74 MMHG | OXYGEN SATURATION: 97 % | HEIGHT: 60 IN | HEART RATE: 80 BPM

## 2023-06-22 DIAGNOSIS — Z01.818 ENCOUNTER FOR OTHER PREPROCEDURAL EXAMINATION: ICD-10-CM

## 2023-06-22 DIAGNOSIS — Z98.890 OTHER SPECIFIED POSTPROCEDURAL STATES: Chronic | ICD-10-CM

## 2023-06-22 DIAGNOSIS — I67.1 CEREBRAL ANEURYSM, NONRUPTURED: ICD-10-CM

## 2023-06-22 DIAGNOSIS — Z86.79 PERSONAL HISTORY OF OTHER DISEASES OF THE CIRCULATORY SYSTEM: Chronic | ICD-10-CM

## 2023-06-22 LAB
ALBUMIN SERPL ELPH-MCNC: 4.4 G/DL — SIGNIFICANT CHANGE UP (ref 3.5–5.2)
ALP SERPL-CCNC: 89 U/L — SIGNIFICANT CHANGE UP (ref 30–115)
ALT FLD-CCNC: 64 U/L — HIGH (ref 0–41)
ANION GAP SERPL CALC-SCNC: 17 MMOL/L — HIGH (ref 7–14)
APTT BLD: 30.9 SEC — SIGNIFICANT CHANGE UP (ref 27–39.2)
AST SERPL-CCNC: 50 U/L — HIGH (ref 0–41)
BASOPHILS # BLD AUTO: 0.08 K/UL — SIGNIFICANT CHANGE UP (ref 0–0.2)
BASOPHILS NFR BLD AUTO: 0.8 % — SIGNIFICANT CHANGE UP (ref 0–1)
BILIRUB SERPL-MCNC: 0.3 MG/DL — SIGNIFICANT CHANGE UP (ref 0.2–1.2)
BUN SERPL-MCNC: 10 MG/DL — SIGNIFICANT CHANGE UP (ref 10–20)
CALCIUM SERPL-MCNC: 9.5 MG/DL — SIGNIFICANT CHANGE UP (ref 8.4–10.5)
CHLORIDE SERPL-SCNC: 100 MMOL/L — SIGNIFICANT CHANGE UP (ref 98–110)
CO2 SERPL-SCNC: 22 MMOL/L — SIGNIFICANT CHANGE UP (ref 17–32)
CREAT SERPL-MCNC: 0.6 MG/DL — LOW (ref 0.7–1.5)
EGFR: 121 ML/MIN/1.73M2 — SIGNIFICANT CHANGE UP
EOSINOPHIL # BLD AUTO: 0.35 K/UL — SIGNIFICANT CHANGE UP (ref 0–0.7)
EOSINOPHIL NFR BLD AUTO: 3.4 % — SIGNIFICANT CHANGE UP (ref 0–8)
GLUCOSE SERPL-MCNC: 76 MG/DL — SIGNIFICANT CHANGE UP (ref 70–99)
HCG SERPL-ACNC: <1 MIU/ML — SIGNIFICANT CHANGE UP
HCT VFR BLD CALC: 42.6 % — SIGNIFICANT CHANGE UP (ref 37–47)
HGB BLD-MCNC: 14.2 G/DL — SIGNIFICANT CHANGE UP (ref 12–16)
IMM GRANULOCYTES NFR BLD AUTO: 0.6 % — HIGH (ref 0.1–0.3)
INR BLD: 0.97 RATIO — SIGNIFICANT CHANGE UP (ref 0.65–1.3)
LYMPHOCYTES # BLD AUTO: 2.13 K/UL — SIGNIFICANT CHANGE UP (ref 1.2–3.4)
LYMPHOCYTES # BLD AUTO: 20.6 % — SIGNIFICANT CHANGE UP (ref 20.5–51.1)
MCHC RBC-ENTMCNC: 27.7 PG — SIGNIFICANT CHANGE UP (ref 27–31)
MCHC RBC-ENTMCNC: 33.3 G/DL — SIGNIFICANT CHANGE UP (ref 32–37)
MCV RBC AUTO: 83.2 FL — SIGNIFICANT CHANGE UP (ref 81–99)
MONOCYTES # BLD AUTO: 0.84 K/UL — HIGH (ref 0.1–0.6)
MONOCYTES NFR BLD AUTO: 8.1 % — SIGNIFICANT CHANGE UP (ref 1.7–9.3)
NEUTROPHILS # BLD AUTO: 6.86 K/UL — HIGH (ref 1.4–6.5)
NEUTROPHILS NFR BLD AUTO: 66.5 % — SIGNIFICANT CHANGE UP (ref 42.2–75.2)
NRBC # BLD: 0 /100 WBCS — SIGNIFICANT CHANGE UP (ref 0–0)
PLATELET # BLD AUTO: 435 K/UL — HIGH (ref 130–400)
PMV BLD: 9.9 FL — SIGNIFICANT CHANGE UP (ref 7.4–10.4)
POTASSIUM SERPL-MCNC: 4.6 MMOL/L — SIGNIFICANT CHANGE UP (ref 3.5–5)
POTASSIUM SERPL-SCNC: 4.6 MMOL/L — SIGNIFICANT CHANGE UP (ref 3.5–5)
PROT SERPL-MCNC: 7.1 G/DL — SIGNIFICANT CHANGE UP (ref 6–8)
PROTHROM AB SERPL-ACNC: 11.1 SEC — SIGNIFICANT CHANGE UP (ref 9.95–12.87)
RBC # BLD: 5.12 M/UL — SIGNIFICANT CHANGE UP (ref 4.2–5.4)
RBC # FLD: 13.7 % — SIGNIFICANT CHANGE UP (ref 11.5–14.5)
SODIUM SERPL-SCNC: 139 MMOL/L — SIGNIFICANT CHANGE UP (ref 135–146)
WBC # BLD: 10.32 K/UL — SIGNIFICANT CHANGE UP (ref 4.8–10.8)
WBC # FLD AUTO: 10.32 K/UL — SIGNIFICANT CHANGE UP (ref 4.8–10.8)

## 2023-06-22 PROCEDURE — 84702 CHORIONIC GONADOTROPIN TEST: CPT

## 2023-06-22 PROCEDURE — 80053 COMPREHEN METABOLIC PANEL: CPT

## 2023-06-22 PROCEDURE — 85025 COMPLETE CBC W/AUTO DIFF WBC: CPT

## 2023-06-22 PROCEDURE — 85730 THROMBOPLASTIN TIME PARTIAL: CPT

## 2023-06-22 PROCEDURE — 93010 ELECTROCARDIOGRAM REPORT: CPT

## 2023-06-22 PROCEDURE — 99214 OFFICE O/P EST MOD 30 MIN: CPT | Mod: 25

## 2023-06-22 PROCEDURE — 93005 ELECTROCARDIOGRAM TRACING: CPT

## 2023-06-22 PROCEDURE — 85610 PROTHROMBIN TIME: CPT

## 2023-06-22 PROCEDURE — 36415 COLL VENOUS BLD VENIPUNCTURE: CPT

## 2023-06-22 NOTE — H&P PST ADULT - NSICDXPASTSURGICALHX_GEN_ALL_CORE_FT
PAST SURGICAL HISTORY:  H/O cerebral embolism     H/O unilateral salpingectomy gyn surgery-ectopic pregnancy   section  Cerebral angiogram    History of cerebral angiography

## 2023-06-22 NOTE — H&P PST ADULT - HISTORY OF PRESENT ILLNESS
Pt denies cp palp uri cough dysuria or sob.     ET:>2  FOS- with  SOB .   2 flat blocks denies SOB    PT denies any open wounds, drainage or rashes.   scheduled ror microcatheter exploration dr morrissey 6/27/23  hx of cerebral AVF and brain bleed /sezizures.  . hx cva 4/2022  currently on depakote daily-follow el juan antonio     As per patient, this is their complete medical and surgical history, including medications both prescribed or over the counter.  Patient verbalized understanding of instructions and was given the opportunity to ask questions and have them answered..    Anesthesia Alert  yes-Difficult Airway  NO--History of neck surgery or radiation  NO--Limited ROM of neck  NO--History of Malignant hyperthermia  NO--Personal or family history of Pseudocholinesterase deficiency.  yes--Prior Anesthesia Complication  low oxygen post 8/2023  embolizatiuon procedure  NO--Latex Allergy  NO--Loose teeth  NO--History of Rheumatoid Arthritis  unknown --DANIEL  admits to snoring-denies work up  NO--Bleeding risk  NO--Other_____

## 2023-06-22 NOTE — H&P PST ADULT - NSICDXPASTMEDICALHX_GEN_ALL_CORE_FT
PAST MEDICAL HISTORY:  Bicornuate uterus     Cerebrovascular dural AV fistula     Migraine headache     Obesity     PCOS (polycystic ovarian syndrome)     Seizures     TIA (transient ischemic attack) [facility]-4/2022-inf lat visual field loss

## 2023-06-23 DIAGNOSIS — I67.1 CEREBRAL ANEURYSM, NONRUPTURED: ICD-10-CM

## 2023-06-23 DIAGNOSIS — Z01.818 ENCOUNTER FOR OTHER PREPROCEDURAL EXAMINATION: ICD-10-CM

## 2023-06-25 NOTE — HISTORY OF PRESENT ILLNESS
[FreeTextEntry1] : 32-year-old female presents to the neurosurgery office today alongside her mother to discuss treatment plan for recurrence of the right pial AVF after [facility] conference last Thursday morning. \par \par Microcatheter exploration was explained in large detail which would be followed by a surgical resection.  It could be performed concurrently but this would not be ideal and for purposes of strategic planning it would be preferred to do the exploration first.  Patient has agreed to this. \par \par Ms. DOWELL will still undergo evaluation to rule out HHT which would be the only reasoning for AVM recurrence post intervention. At that time, Gamma Knife would have to take place. \par \par Since Dr. Hanna increased the Depakote, patient endorses that the seizures have stopped.

## 2023-06-25 NOTE — END OF VISIT
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  I discussed the recommendations of the contents of microcatheter exploration plus or minus microsurgical resection.  I gave her all the options of provider and location in terms of her that she like to do this here at [place] or [facility] with either myself and or with Dr. Galindo.  She will get back to us on that but appears to be preferring to stay locally at [facility] at this time.  I explained that the plan will be to perform microcatheter angiographic aspiration of this area under general anesthetic but that it would be a very low likelihood that endovascular management would cure this and the ideal the microcatheter exploration would be to definitively prove that this is a residual or recurrent arteriovenous malformation and then to plan surgical resection.  She was satisfied with the outcome of consultation and had no further questions.

## 2023-06-25 NOTE — REVIEW OF SYSTEMS
[Negative] : Eyes [Seizures] : no convulsions [Difficulty Walking] : no difficulty walking [Frequent Falls] : not falling [Incontinence] : no incontinence [de-identified] : see HPI

## 2023-06-27 ENCOUNTER — TRANSCRIPTION ENCOUNTER (OUTPATIENT)
Age: 33
End: 2023-06-27

## 2023-06-27 ENCOUNTER — APPOINTMENT (OUTPATIENT)
Dept: NEUROSURGERY | Facility: HOSPITAL | Age: 33
End: 2023-06-27

## 2023-06-27 ENCOUNTER — OUTPATIENT (OUTPATIENT)
Dept: OUTPATIENT SERVICES | Facility: HOSPITAL | Age: 33
LOS: 1 days | Discharge: HOME | End: 2023-06-27
Payer: MEDICAID

## 2023-06-27 VITALS
SYSTOLIC BLOOD PRESSURE: 137 MMHG | RESPIRATION RATE: 18 BRPM | DIASTOLIC BLOOD PRESSURE: 55 MMHG | WEIGHT: 224.87 LBS | HEART RATE: 89 BPM | HEIGHT: 61 IN | OXYGEN SATURATION: 98 % | TEMPERATURE: 97 F

## 2023-06-27 VITALS
RESPIRATION RATE: 16 BRPM | DIASTOLIC BLOOD PRESSURE: 60 MMHG | HEART RATE: 88 BPM | OXYGEN SATURATION: 96 % | SYSTOLIC BLOOD PRESSURE: 104 MMHG

## 2023-06-27 DIAGNOSIS — Z09 ENCOUNTER FOR FOLLOW-UP EXAMINATION AFTER COMPLETED TREATMENT FOR CONDITIONS OTHER THAN MALIGNANT NEOPLASM: ICD-10-CM

## 2023-06-27 DIAGNOSIS — Z98.890 OTHER SPECIFIED POSTPROCEDURAL STATES: Chronic | ICD-10-CM

## 2023-06-27 DIAGNOSIS — Z86.79 PERSONAL HISTORY OF OTHER DISEASES OF THE CIRCULATORY SYSTEM: Chronic | ICD-10-CM

## 2023-06-27 DIAGNOSIS — I67.1 CEREBRAL ANEURYSM, NONRUPTURED: ICD-10-CM

## 2023-06-27 PROCEDURE — 36224 PLACE CATH CAROTD ART: CPT | Mod: RT

## 2023-06-27 PROCEDURE — C1769: CPT

## 2023-06-27 PROCEDURE — 36226 PLACE CATH VERTEBRAL ART: CPT | Mod: RT

## 2023-06-27 PROCEDURE — C9399: CPT

## 2023-06-27 PROCEDURE — C1887: CPT

## 2023-06-27 PROCEDURE — 36223 PLACE CATH CAROTID/INOM ART: CPT | Mod: 59,LT

## 2023-06-27 PROCEDURE — 76377 3D RENDER W/INTRP POSTPROCES: CPT | Mod: 26

## 2023-06-27 PROCEDURE — C1894: CPT

## 2023-06-27 PROCEDURE — 76937 US GUIDE VASCULAR ACCESS: CPT | Mod: 26

## 2023-06-27 RX ORDER — ACETAMINOPHEN 500 MG
975 TABLET ORAL ONCE
Refills: 0 | Status: DISCONTINUED | OUTPATIENT
Start: 2023-06-27 | End: 2023-06-27

## 2023-06-27 RX ORDER — SODIUM CHLORIDE 9 MG/ML
500 INJECTION INTRAMUSCULAR; INTRAVENOUS; SUBCUTANEOUS
Refills: 0 | Status: DISCONTINUED | OUTPATIENT
Start: 2023-06-27 | End: 2023-06-27

## 2023-06-27 RX ORDER — SODIUM CHLORIDE 9 MG/ML
1000 INJECTION INTRAMUSCULAR; INTRAVENOUS; SUBCUTANEOUS
Refills: 0 | Status: DISCONTINUED | OUTPATIENT
Start: 2023-06-27 | End: 2023-06-27

## 2023-06-27 NOTE — ASU DISCHARGE PLAN (ADULT/PEDIATRIC) - CARE PROVIDER_API CALL
Jorge Borja  Neurosurgery  [address] [place] [address], [address] [address]  Phone: [phone]  Fax: [phone]  Follow Up Time:

## 2023-06-27 NOTE — ASU DISCHARGE PLAN (ADULT/PEDIATRIC) - NS MD DC FALL RISK RISK
For information on Fall & Injury Prevention, visit: [email or web address] OR  [email or web address] OR  https://www.cdc.gov/steadi/patient.html

## 2023-06-27 NOTE — PRE PROCEDURE NOTE - PRE PROCEDURE EVALUATION
Interventional Neuro Radiology  Pre-Procedure Note PA-C    HPI: The patient is a 32-year-old female with a past history of right pial AVF who presents today for a diagnostic cerebral angiogram +/- embolization with Dr. Borja.   NIHSS 2 for left hemianopsia.   NPO after midnight   IV normal saline at 75 ml/hr on arrival     Allergies: No Known Allergies      PAST MEDICAL & SURGICAL HISTORY:  Bicornuate uterus  Migraine headache  TIA (transient ischemic attack)  [facility]-2022-inf lat visual field loss  PCOS (polycystic ovarian syndrome)  Obesity  Cerebrovascular dural AV fistula  Seizures  H/O unilateral salpingectomy  gyn surgery-ectopic pregnancy   section  Cerebral angiogram  History of cerebral angiography  H/O cerebral embolism    Social History:     FAMILY HISTORY:  Family history of systemic lupus erythematosus (SLE) in mother (Mother)    Family history of hemophilia A (Sibling)    Family history of antiphospholipid syndrome (Mother)        Current Medications: sodium chloride 0.9%. 1000 milliLiter(s) IV Continuous <Continuous>      Assessment/Plan:   This is a 32 year old right  hand dominant female who presents for diagnostic cerebral angiogram +/- embolization.   Procedure, goals, risks, benefits and alternatives  were discussed with patient and patient's family.  All questions were answered to best understanding.   Risks discussed include but are not limited to stroke, vessel injury, hemorrhage, and/or hematoma.  Patient demonstrates understanding  of all risks involved with this procedure and wishes to continue.     Appropriate consent  was obtained from patient and consent is in the patient's chart.     Interventional Neuro Radiology  Pre-Procedure Note PA-C    HPI: The patient is a 32-year-old female with a past history of right pial AVF who presents today for a diagnostic cerebral angiogram +/- embolization with Dr. Borja.   NIHSS 2 for left hemianopsia.   NPO after midnight   IV normal saline at 75 ml/hr on arrival     Allergies: No Known Allergies    PAST MEDICAL & SURGICAL HISTORY:  Bicornuate uterus  Migraine headache  TIA (transient ischemic attack)  [facility]-2022-inf lat visual field loss  PCOS (polycystic ovarian syndrome)  Obesity  Cerebrovascular dural AV fistula  Seizures  H/O unilateral salpingectomy  gyn surgery-ectopic pregnancy   section  Cerebral angiogram  History of cerebral angiography  H/O cerebral embolism    Social History:     FAMILY HISTORY:  Family history of systemic lupus erythematosus (SLE) in mother (Mother)  Family history of hemophilia A (Sibling)  Family history of antiphospholipid syndrome (Mother)    Current Medications: sodium chloride 0.9%. 1000 milliLiter(s) IV Continuous <Continuous>    Assessment/Plan:   This is a 32-year-old female who presents for diagnostic cerebral angiogram +/- embolization.   Procedure, goals, risks, benefits and alternatives were discussed with patient and patient's family.  All questions were answered to best understanding.   Risks discussed include but are not limited to stroke, vessel injury, hemorrhage, and/or hematoma.  Patient demonstrates understanding of all risks involved with this procedure and wishes to continue.     Appropriate consent was obtained from patient and consent is in the patient's chart.

## 2023-06-27 NOTE — BRIEF OPERATIVE NOTE - OPERATION/FINDINGS
Procedure: Diagnostic Cerebral Angiogram    Amount and type of contrast: Visipaque 320  Medications given during procedure: Verapamil 2.5 mg IA, Heparin 3000 IU IA, Nitroglycerin 200 mcg IA, see also anesthesiology note  Implants placed: None  Complications: None    Post-procedure exam:   NIHSS: 2 for left hemonymous hemianopsia  Extremity - right upper extremity dressing CDI without evidence of hematoma, bleeding, infection, nontender to palpation, soft. Distal pulses palpable bilaterally.   Abd - NTND  Please follow post NI orders for neuro checks, distal pulses, vitals, and arm checks placed for total of 2 hours recovery period following procedure.   SBP: <140    Please notify provider with any signs of bleeding or hematoma at R arm site, change in mental status, vitals outside parameters, or absent distal pulses.  [phone]    Preliminary Findings: No AVM recurrence noted on preliminary review, however, a final review of imaging is pending. Procedure: Diagnostic Cerebral Angiogram    Amount and type of contrast: Visipaque 320  Medications given during procedure: Verapamil 2.5 mg IA, Heparin 3000 IU IA, Nitroglycerin 200 mcg IA, see also anesthesiology note  Implants placed: None  Complications: None    Post-procedure exam:   NIHSS: 2 for left hemonymous hemianopsia  Extremity - Right upper extremity dressing CDI without evidence of hematoma, bleeding, infection, nontender to palpation, soft. Distal pulses palpable bilaterally.   Abd - NTND  Please follow post NI orders for neuro checks, distal pulses, vitals, and arm checks placed for total of 2 hours recovery period following procedure.   SBP: <140    Please notify provider with any signs of bleeding or hematoma at R arm site, change in mental status, vitals outside parameters, or absent distal pulses.  [phone]    Preliminary Findings: No AVM recurrence noted on preliminary review, however, a final review of imaging is pending. Right ulnar arteriotomy site used for access.

## 2023-07-07 ENCOUNTER — APPOINTMENT (OUTPATIENT)
Dept: NEUROSURGERY | Facility: CLINIC | Age: 33
End: 2023-07-07
Payer: MEDICAID

## 2023-07-07 VITALS — HEIGHT: 62 IN | BODY MASS INDEX: 36.8 KG/M2 | WEIGHT: 200 LBS

## 2023-07-07 DIAGNOSIS — R56.9 UNSPECIFIED CONVULSIONS: ICD-10-CM

## 2023-07-07 DIAGNOSIS — Z09 ENCOUNTER FOR FOLLOW-UP EXAMINATION AFTER COMPLETED TREATMENT FOR CONDITIONS OTHER THAN MALIGNANT NEOPLASM: ICD-10-CM

## 2023-07-07 DIAGNOSIS — I77.0 ARTERIOVENOUS FISTULA, ACQUIRED: ICD-10-CM

## 2023-07-07 PROCEDURE — 99213 OFFICE O/P EST LOW 20 MIN: CPT

## 2023-07-07 NOTE — HISTORY OF PRESENT ILLNESS
[FreeTextEntry1] : I reviewed this pleasant lady today together with her mother in the neurosurgery follow-up clinic.  Today we went over the fact that the most recent selective catheter angiogram that was performed to evaluate for residual arteriovenous malformation along the right sided occipital parietal region did not disclose any obvious residual arteriovenous malformation.  The previously discovered early filling cortical vein was not obvious on this investigation.  In retrospect this may have represented an anterior circulation vein that was draining concurrently to the posterior circulation but in any event there does not appear to be any residual shunting that requires treatment at this time.\par \par She remains well after optimizing her antiseizure medication and denies any new neurological symptoms or complaints.  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  She knows to contact us in the future if there are any concerns.

## 2023-07-17 PROBLEM — R56.9 CONVULSIONS, UNSPECIFIED CONVULSION TYPE: Status: ACTIVE | Noted: 2023-02-13

## 2023-07-18 ENCOUNTER — OUTPATIENT (OUTPATIENT)
Dept: OUTPATIENT SERVICES | Facility: HOSPITAL | Age: 33
LOS: 1 days | End: 2023-07-18

## 2023-07-18 DIAGNOSIS — Z86.79 PERSONAL HISTORY OF OTHER DISEASES OF THE CIRCULATORY SYSTEM: Chronic | ICD-10-CM

## 2023-07-18 DIAGNOSIS — Z98.890 OTHER SPECIFIED POSTPROCEDURAL STATES: Chronic | ICD-10-CM

## 2023-07-18 DIAGNOSIS — R56.9 UNSPECIFIED CONVULSIONS: ICD-10-CM

## 2023-07-19 DIAGNOSIS — R56.9 UNSPECIFIED CONVULSIONS: ICD-10-CM

## 2023-07-19 LAB
ALBUMIN SERPL ELPH-MCNC: 3.9 G/DL
ALP BLD-CCNC: 84 U/L
ALT SERPL-CCNC: 57 U/L
ANION GAP SERPL CALC-SCNC: 16 MMOL/L
AST SERPL-CCNC: 39 U/L
BILIRUB SERPL-MCNC: 0.4 MG/DL
BUN SERPL-MCNC: 13 MG/DL
CALCIUM SERPL-MCNC: 9.4 MG/DL
CHLORIDE SERPL-SCNC: 101 MMOL/L
CO2 SERPL-SCNC: 24 MMOL/L
CREAT SERPL-MCNC: 0.7 MG/DL
EGFR: 116 ML/MIN/1.73M2
GLUCOSE SERPL-MCNC: 107 MG/DL
MAGNESIUM SERPL-MCNC: 1.6 MG/DL
POTASSIUM SERPL-SCNC: 4.3 MMOL/L
PROT SERPL-MCNC: 6.2 G/DL
SODIUM SERPL-SCNC: 141 MMOL/L
VALPROATE SERPL-MCNC: 66 UG/ML

## 2023-07-19 NOTE — PROGRESS NOTE ADULT - SUBJECTIVE AND OBJECTIVE BOX
You will need the rest of the rabies vaccine on day 3, 7 and 14.  Today is considered day 0 you can see if your primary care clinic has this or I did place a treatment plan for which they will contact you when you come back to the hospital, not the emergency room, for the shots.   Neurology Progress Note    Interval History:    Patient was seen and examined, no acute event over night.     Medications:  acetaminophen     Tablet .. 650 milliGRAM(s) Oral every 6 hours PRN  artificial  tears Solution 1 Drop(s) Both EYES every 4 hours PRN  chlorhexidine 4% Liquid 1 Application(s) Topical every 12 hours  heparin   Injectable 5000 Unit(s) SubCutaneous every 8 hours  oxyCODONE    IR 5 milliGRAM(s) Oral every 6 hours PRN  sodium chloride 0.9%. 1000 milliLiter(s) IV Continuous <Continuous>      Vital Signs Last 24 Hrs  T(C): 36.4 (29 Apr 2022 04:00), Max: 36.6 (28 Apr 2022 18:15)  T(F): 97.5 (29 Apr 2022 04:00), Max: 97.9 (28 Apr 2022 18:15)  HR: 84 (29 Apr 2022 04:00) (78 - 94)  BP: 103/55 (29 Apr 2022 04:00) (101/63 - 119/66)  BP(mean): 68 (29 Apr 2022 04:00) (68 - 90)  RR: 16 (29 Apr 2022 04:00) (16 - 18)  SpO2: 98% (29 Apr 2022 04:00) (95% - 98%)    Neurological Examination:  General:  Appearance is consistent with chronologic age.  No abnormal facies.  Gross skin survey within normal limits.    Cognitive/Language:  Awake, alert, and oriented to person, place, time and date.  Recent and remote memory intact.  Fund of knowledge is appropriate.  Naming, repetition and comprehension intact.   Nondysarthric.    Cranial Nerves  - Eyes: Visual acuity intact, Visual fields full.  EOMI w/o nystagmus, skew or reported double vision.  PERRL.  No ptosis/weakness of eyelid closure.    - Face:  Facial sensation normal V1 - 3, no facial asymmetry.    - Ears/Nose/Throat:  Hearing grossly intact b/l to finger rub.  Palate elevates midline.  Tongue and uvula midline.   Motor examination:  (MRC grade R/L) 5/5 UE; 5/5 LE.  No observable drift. Normal tone and bulk. No tenderness, twitching, tremors or involuntary movements.  Sensory examination:   Intact to light touch and pinprick, pain, temperature and proprioception and vibration in all extremities.  Reflexes:   2+ b/l biceps, triceps, brachioradialis, patella and achilles.  Plantar response downgoing b/l.  Jaw jerk, Emanuel, clonus absent.  Cerebellum:   FTN/HKS intact.  No dysmetria or dysdiadokinesia.  Gait narrow based and normal.    Labs:  CBC Full  -  ( 29 Apr 2022 05:15 )  WBC Count : 11.23 K/uL  RBC Count : 4.21 M/uL  Hemoglobin : 11.1 g/dL  Hematocrit : 33.2 %  Platelet Count - Automated : 385 K/uL  Mean Cell Volume : 78.9 fL  Mean Cell Hemoglobin : 26.4 pg  Mean Cell Hemoglobin Concentration : 33.4 g/dL  Auto Neutrophil # : x  Auto Lymphocyte # : x  Auto Monocyte # : x  Auto Eosinophil # : x  Auto Basophil # : x  Auto Neutrophil % : x  Auto Lymphocyte % : x  Auto Monocyte % : x  Auto Eosinophil % : x  Auto Basophil % : x    04-29    142  |  107  |  12  ----------------------------<  116<H>  3.8   |  24  |  0.6<L>    Ca    8.7      29 Apr 2022 05:15  Phos  3.8     04-29  Mg     1.9     04-29              RADIOLOGY & ADDITIONAL TESTS:  MRA HEAD:    Normal distal internal carotid arteries.    The proximal anterior, middle and posterior cerebral arteries are patent   bilaterally.    Normal vertebrobasilar system.    No evidence of vascular stenosis, occlusion, aneurysm or vascular   malformation.      MRV HEAD:    The dural venous sinuses and deep venous structures are patent and   demonstrate normal course and caliber.    The jugular bulbs and proximal internal jugular veins are patent.      IMPRESSION:    MRI BRAIN:  Redemonstrated right occipital intraparenchymal hemorrhage with mild   surrounding edemaand mass effect. No evidence of underlying mass or   vascular malformation.    MRA HEAD:  Unremarkable.    MRV HEAD: .  Unremarkable.    --- End of Report ---

## 2023-07-21 ENCOUNTER — NON-APPOINTMENT (OUTPATIENT)
Age: 33
End: 2023-07-21

## 2023-07-28 DIAGNOSIS — I63.9 CEREBRAL INFARCTION, UNSPECIFIED: ICD-10-CM

## 2023-09-13 ENCOUNTER — NON-APPOINTMENT (OUTPATIENT)
Age: 33
End: 2023-09-13

## 2023-09-22 ENCOUNTER — APPOINTMENT (OUTPATIENT)
Dept: PEDIATRIC MEDICAL GENETICS | Facility: CLINIC | Age: 33
End: 2023-09-22

## 2023-09-25 NOTE — STROKE CODE NOTE - IV ALTEPLASE EXCL ABS HIDDEN
show Adjacent Tissue Transfer Text: The defect edges were debeveled with a #15 scalpel blade.  Given the location of the defect and the proximity to free margins an adjacent tissue transfer was deemed most appropriate.  Using a sterile surgical marker, an appropriate flap was drawn incorporating the defect and placing the expected incisions within the relaxed skin tension lines where possible.    The area thus outlined was incised deep to adipose tissue with a #15 scalpel blade.  The skin margins were undermined to an appropriate distance in all directions utilizing iris scissors.

## 2023-10-05 ENCOUNTER — RX RENEWAL (OUTPATIENT)
Age: 33
End: 2023-10-05

## 2023-10-09 ENCOUNTER — RX RENEWAL (OUTPATIENT)
Age: 33
End: 2023-10-09

## 2023-10-15 ENCOUNTER — EMERGENCY (EMERGENCY)
Facility: HOSPITAL | Age: 33
LOS: 0 days | Discharge: HOME | End: 2023-10-15
Attending: EMERGENCY MEDICINE
Payer: MEDICAID

## 2023-10-15 VITALS
SYSTOLIC BLOOD PRESSURE: 136 MMHG | WEIGHT: 225.97 LBS | HEART RATE: 78 BPM | DIASTOLIC BLOOD PRESSURE: 71 MMHG | HEIGHT: 61 IN | TEMPERATURE: 98 F | OXYGEN SATURATION: 99 % | RESPIRATION RATE: 18 BRPM

## 2023-10-15 VITALS
OXYGEN SATURATION: 99 % | HEART RATE: 83 BPM | SYSTOLIC BLOOD PRESSURE: 131 MMHG | DIASTOLIC BLOOD PRESSURE: 76 MMHG | RESPIRATION RATE: 18 BRPM

## 2023-10-15 DIAGNOSIS — Z98.890 OTHER SPECIFIED POSTPROCEDURAL STATES: Chronic | ICD-10-CM

## 2023-10-15 DIAGNOSIS — Z90.722 ACQUIRED ABSENCE OF OVARIES, BILATERAL: ICD-10-CM

## 2023-10-15 DIAGNOSIS — Z86.73 PERSONAL HISTORY OF TRANSIENT ISCHEMIC ATTACK (TIA), AND CEREBRAL INFARCTION WITHOUT RESIDUAL DEFICITS: ICD-10-CM

## 2023-10-15 DIAGNOSIS — G43.909 MIGRAINE, UNSPECIFIED, NOT INTRACTABLE, WITHOUT STATUS MIGRAINOSUS: ICD-10-CM

## 2023-10-15 DIAGNOSIS — E28.2 POLYCYSTIC OVARIAN SYNDROME: ICD-10-CM

## 2023-10-15 DIAGNOSIS — R10.10 UPPER ABDOMINAL PAIN, UNSPECIFIED: ICD-10-CM

## 2023-10-15 DIAGNOSIS — G40.909 EPILEPSY, UNSPECIFIED, NOT INTRACTABLE, WITHOUT STATUS EPILEPTICUS: ICD-10-CM

## 2023-10-15 DIAGNOSIS — K80.70 CALCULUS OF GALLBLADDER AND BILE DUCT WITHOUT CHOLECYSTITIS WITHOUT OBSTRUCTION: ICD-10-CM

## 2023-10-15 DIAGNOSIS — Z86.79 PERSONAL HISTORY OF OTHER DISEASES OF THE CIRCULATORY SYSTEM: Chronic | ICD-10-CM

## 2023-10-15 LAB
ALBUMIN SERPL ELPH-MCNC: 4.4 G/DL — SIGNIFICANT CHANGE UP (ref 3.5–5.2)
ALP SERPL-CCNC: 98 U/L — SIGNIFICANT CHANGE UP (ref 30–115)
ALT FLD-CCNC: 46 U/L — HIGH (ref 0–41)
ANION GAP SERPL CALC-SCNC: 12 MMOL/L — SIGNIFICANT CHANGE UP (ref 7–14)
APPEARANCE UR: CLEAR — SIGNIFICANT CHANGE UP
AST SERPL-CCNC: 35 U/L — SIGNIFICANT CHANGE UP (ref 0–41)
BACTERIA # UR AUTO: ABNORMAL /HPF
BASOPHILS # BLD AUTO: 0.08 K/UL — SIGNIFICANT CHANGE UP (ref 0–0.2)
BASOPHILS NFR BLD AUTO: 0.6 % — SIGNIFICANT CHANGE UP (ref 0–1)
BILIRUB SERPL-MCNC: 0.3 MG/DL — SIGNIFICANT CHANGE UP (ref 0.2–1.2)
BILIRUB UR-MCNC: NEGATIVE — SIGNIFICANT CHANGE UP
BUN SERPL-MCNC: 8 MG/DL — LOW (ref 10–20)
CALCIUM SERPL-MCNC: 10 MG/DL — SIGNIFICANT CHANGE UP (ref 8.4–10.5)
CHLORIDE SERPL-SCNC: 100 MMOL/L — SIGNIFICANT CHANGE UP (ref 98–110)
CO2 SERPL-SCNC: 27 MMOL/L — SIGNIFICANT CHANGE UP (ref 17–32)
COLOR SPEC: YELLOW — SIGNIFICANT CHANGE UP
CREAT SERPL-MCNC: 0.6 MG/DL — LOW (ref 0.7–1.5)
DIFF PNL FLD: NEGATIVE — SIGNIFICANT CHANGE UP
EGFR: 120 ML/MIN/1.73M2 — SIGNIFICANT CHANGE UP
EOSINOPHIL # BLD AUTO: 0.17 K/UL — SIGNIFICANT CHANGE UP (ref 0–0.7)
EOSINOPHIL NFR BLD AUTO: 1.2 % — SIGNIFICANT CHANGE UP (ref 0–8)
EPI CELLS # UR: PRESENT
GLUCOSE SERPL-MCNC: 99 MG/DL — SIGNIFICANT CHANGE UP (ref 70–99)
GLUCOSE UR QL: NEGATIVE MG/DL — SIGNIFICANT CHANGE UP
HCG SERPL QL: NEGATIVE — SIGNIFICANT CHANGE UP
HCT VFR BLD CALC: 42 % — SIGNIFICANT CHANGE UP (ref 37–47)
HGB BLD-MCNC: 14.5 G/DL — SIGNIFICANT CHANGE UP (ref 12–16)
IMM GRANULOCYTES NFR BLD AUTO: 0.5 % — HIGH (ref 0.1–0.3)
KETONES UR-MCNC: NEGATIVE MG/DL — SIGNIFICANT CHANGE UP
LEUKOCYTE ESTERASE UR-ACNC: NEGATIVE — SIGNIFICANT CHANGE UP
LIDOCAIN IGE QN: 25 U/L — SIGNIFICANT CHANGE UP (ref 7–60)
LYMPHOCYTES # BLD AUTO: 15.8 % — LOW (ref 20.5–51.1)
LYMPHOCYTES # BLD AUTO: 2.15 K/UL — SIGNIFICANT CHANGE UP (ref 1.2–3.4)
MAGNESIUM SERPL-MCNC: 1.8 MG/DL — SIGNIFICANT CHANGE UP (ref 1.8–2.4)
MCHC RBC-ENTMCNC: 27.9 PG — SIGNIFICANT CHANGE UP (ref 27–31)
MCHC RBC-ENTMCNC: 34.5 G/DL — SIGNIFICANT CHANGE UP (ref 32–37)
MCV RBC AUTO: 80.9 FL — LOW (ref 81–99)
MONOCYTES # BLD AUTO: 0.81 K/UL — HIGH (ref 0.1–0.6)
MONOCYTES NFR BLD AUTO: 6 % — SIGNIFICANT CHANGE UP (ref 1.7–9.3)
NEUTROPHILS # BLD AUTO: 10.33 K/UL — HIGH (ref 1.4–6.5)
NEUTROPHILS NFR BLD AUTO: 75.9 % — HIGH (ref 42.2–75.2)
NITRITE UR-MCNC: NEGATIVE — SIGNIFICANT CHANGE UP
NRBC # BLD: 0 /100 WBCS — SIGNIFICANT CHANGE UP (ref 0–0)
PH UR: 7 — SIGNIFICANT CHANGE UP (ref 5–8)
PLATELET # BLD AUTO: 449 K/UL — HIGH (ref 130–400)
PMV BLD: 8.9 FL — SIGNIFICANT CHANGE UP (ref 7.4–10.4)
POTASSIUM SERPL-MCNC: 5.3 MMOL/L — HIGH (ref 3.5–5)
POTASSIUM SERPL-SCNC: 5.3 MMOL/L — HIGH (ref 3.5–5)
PROT SERPL-MCNC: 7.2 G/DL — SIGNIFICANT CHANGE UP (ref 6–8)
PROT UR-MCNC: 30 MG/DL
RBC # BLD: 5.19 M/UL — SIGNIFICANT CHANGE UP (ref 4.2–5.4)
RBC # FLD: 13 % — SIGNIFICANT CHANGE UP (ref 11.5–14.5)
RBC CASTS # UR COMP ASSIST: 2 /HPF — SIGNIFICANT CHANGE UP (ref 0–4)
SODIUM SERPL-SCNC: 139 MMOL/L — SIGNIFICANT CHANGE UP (ref 135–146)
SP GR SPEC: 1.01 — SIGNIFICANT CHANGE UP (ref 1–1.03)
UROBILINOGEN FLD QL: 0.2 MG/DL — SIGNIFICANT CHANGE UP (ref 0.2–1)
VALPROATE SERPL-MCNC: 61 UG/ML — SIGNIFICANT CHANGE UP (ref 50–100)
WBC # BLD: 13.61 K/UL — HIGH (ref 4.8–10.8)
WBC # FLD AUTO: 13.61 K/UL — HIGH (ref 4.8–10.8)
WBC UR QL: 2 /HPF — SIGNIFICANT CHANGE UP (ref 0–5)

## 2023-10-15 PROCEDURE — 83690 ASSAY OF LIPASE: CPT

## 2023-10-15 PROCEDURE — 93010 ELECTROCARDIOGRAM REPORT: CPT

## 2023-10-15 PROCEDURE — 81001 URINALYSIS AUTO W/SCOPE: CPT

## 2023-10-15 PROCEDURE — 76705 ECHO EXAM OF ABDOMEN: CPT

## 2023-10-15 PROCEDURE — 99285 EMERGENCY DEPT VISIT HI MDM: CPT | Mod: 25

## 2023-10-15 PROCEDURE — 99285 EMERGENCY DEPT VISIT HI MDM: CPT

## 2023-10-15 PROCEDURE — 83735 ASSAY OF MAGNESIUM: CPT

## 2023-10-15 PROCEDURE — 76705 ECHO EXAM OF ABDOMEN: CPT | Mod: 26

## 2023-10-15 PROCEDURE — 71046 X-RAY EXAM CHEST 2 VIEWS: CPT | Mod: 26

## 2023-10-15 PROCEDURE — 85025 COMPLETE CBC W/AUTO DIFF WBC: CPT

## 2023-10-15 PROCEDURE — 36415 COLL VENOUS BLD VENIPUNCTURE: CPT

## 2023-10-15 PROCEDURE — 80164 ASSAY DIPROPYLACETIC ACD TOT: CPT

## 2023-10-15 PROCEDURE — 93005 ELECTROCARDIOGRAM TRACING: CPT

## 2023-10-15 PROCEDURE — 84703 CHORIONIC GONADOTROPIN ASSAY: CPT

## 2023-10-15 PROCEDURE — 80053 COMPREHEN METABOLIC PANEL: CPT

## 2023-10-15 PROCEDURE — 71046 X-RAY EXAM CHEST 2 VIEWS: CPT

## 2023-10-15 PROCEDURE — 96374 THER/PROPH/DIAG INJ IV PUSH: CPT

## 2023-10-15 PROCEDURE — 96375 TX/PRO/DX INJ NEW DRUG ADDON: CPT

## 2023-10-15 RX ORDER — SODIUM CHLORIDE 9 MG/ML
1000 INJECTION INTRAMUSCULAR; INTRAVENOUS; SUBCUTANEOUS ONCE
Refills: 0 | Status: COMPLETED | OUTPATIENT
Start: 2023-10-15 | End: 2023-10-15

## 2023-10-15 RX ORDER — MORPHINE SULFATE 50 MG/1
4 CAPSULE, EXTENDED RELEASE ORAL ONCE
Refills: 0 | Status: DISCONTINUED | OUTPATIENT
Start: 2023-10-15 | End: 2023-10-15

## 2023-10-15 RX ORDER — ONDANSETRON 8 MG/1
4 TABLET, FILM COATED ORAL ONCE
Refills: 0 | Status: COMPLETED | OUTPATIENT
Start: 2023-10-15 | End: 2023-10-15

## 2023-10-15 RX ADMIN — MORPHINE SULFATE 4 MILLIGRAM(S): 50 CAPSULE, EXTENDED RELEASE ORAL at 17:43

## 2023-10-15 RX ADMIN — SODIUM CHLORIDE 1000 MILLILITER(S): 9 INJECTION INTRAMUSCULAR; INTRAVENOUS; SUBCUTANEOUS at 16:45

## 2023-10-15 RX ADMIN — ONDANSETRON 4 MILLIGRAM(S): 8 TABLET, FILM COATED ORAL at 16:45

## 2023-10-15 NOTE — ED PROVIDER NOTE - NSFOLLOWUPINSTRUCTIONS_ED_ALL_ED_FT
Our Emergency Department Referral Coordinators will be reaching out to you in the next 24-48 hours from 9:00am to 5:00pm with a follow up appointment. Please expect a phone call from the hospital in that time frame. If you do not receive a call or if you have any questions or concerns, you can reach them at   [phone]            BILIARY COLIC - AfterCare(R) Instructions(ER/ED)     Biliary Colic    WHAT YOU NEED TO KNOW:    Biliary colic is severe pain in your upper abdomen caused by a gallbladder problem. Your gallbladder stores bile, which helps break down the fats that you eat. Gallbladder         DISCHARGE INSTRUCTIONS:    Medicines:     Medicines can help decrease pain and muscle spasms. You may also need medicine to calm your stomach and stop vomiting.      Take your medicine as directed. Contact your healthcare provider if you think your medicine is not helping or you have side effects. Tell him if you are allergic to any medicine. Keep a list of the medicines, vitamins, and herbs you take. Include the amounts, and when and why you take them. Bring the list or the pill bottles to follow-up visits. Carry your medicine list with you in case of an emergency.    Avoid alcohol: Alcohol can damage your gallbladder and make your symptoms worse.    Maintain a healthy weight: Ask your healthcare provider how much you should weigh. Ask him to help you create a weight loss plan if you are overweight.     Eat a variety of healthy foods: Healthy foods include fruits, vegetables, whole-grain breads, low-fat dairy products, beans, lean meats, and fish. Foods that are high in fiber and low in fat and cholesterol may decrease your symptoms. Ask if you need to be on a special diet.    Exercise: Ask your healthcare provider about the best exercise plan for you. Exercise may help improve your symptoms.    Follow up with your healthcare provider as directed: Bring a list of any questions you have so you remember to ask them during your visits.     Contact your healthcare provider if:     You have a fever.      Your pain gets worse, even after you take medicine.      You have nausea or are vomiting.      Your skin or eyes are yellow.      You have questions or concerns about your condition or care.    Return to the emergency department if:     You have severe pain.      You feel like you are going to faint.      You are short of breath.

## 2023-10-15 NOTE — ED PROVIDER NOTE - OBJECTIVE STATEMENT
34 y/o female with hx of gallstones presents to the Ed with upper abdominal pain since 11 am with nausea and vomiting. patient c/o pain radiating to her back . no fevers. no dysuria or diarrhea. c/o gnawing pain .

## 2023-10-15 NOTE — ED PROVIDER NOTE - PHYSICAL EXAMINATION
general: well appearing, no distress  eyes: clear conjunctiva  cardiac: no murmurs, extrasystole, regular rate, regular rhythm  resp: clear throughout all lung fields, no respiratory distress  abdomen: no CVA tenderness, BS x 4, RUQ tenderness, +murphys sign, no distention, no rashes, no rebound or guarding  msk: pelvis stable, gait steady  skin: no rashes, swelling, bruising

## 2023-10-15 NOTE — ED ADULT NURSE NOTE - NSICDXPASTMEDICALHX_GEN_ALL_CORE_FT
PAST MEDICAL HISTORY:  Bicornuate uterus     Cerebrovascular dural AV fistula     Migraine headache     Obesity     PCOS (polycystic ovarian syndrome)     Seizures     TIA (transient ischemic attack) [facility]-4/2022-inf lat visual field loss

## 2023-10-15 NOTE — ED PROVIDER NOTE - ATTENDING APP SHARED VISIT CONTRIBUTION OF CARE
33-year-old female past medical history of AV malformation, seizures, history of gallstones presents for evaluation of right-sided upper abdominal pain that started early this morning.  Positive nausea, patient states that she ate breakfast for dinner last night.  No fever or chills.  On exam patient in NAD, AAOx3, seemingly with steady gait, abdomen is soft, nondistended, positive tenderness right upper quadrant and epigastric area, no rash

## 2023-10-15 NOTE — ED ADULT NURSE NOTE - NSFALLUNIVINTERV_ED_ALL_ED
Bed/Stretcher in lowest position, wheels locked, appropriate side rails in place/Call bell, personal items and telephone in reach/Instruct patient to call for assistance before getting out of bed/chair/stretcher/Non-slip footwear applied when patient is off stretcher/[place] to call system/Physically safe environment - no spills, clutter or unnecessary equipment/Purposeful proactive rounding/Room/bathroom lighting operational, light cord in reach

## 2023-10-15 NOTE — ED PROVIDER NOTE - CLINICAL SUMMARY MEDICAL DECISION MAKING FREE TEXT BOX
Labs and imaging obtained.  Patient found to have gallstones.  Patient prefers to go home.  Patient offered surgical consultation.  Patient will follow low-fat diet.  She knows that she should return if any worsening symptoms or concerns.  Will place in rapid referral system for surgery follow-up.

## 2023-10-15 NOTE — ED PROVIDER NOTE - PATIENT PORTAL LINK FT
You can access the FollowMyHealth Patient Portal offered by [facility] by registering at the following website: [email or web address]. By joining [employer]’s FollowMyHealth portal, you will also be able to view your health information using other applications (apps) compatible with our system.

## 2023-10-15 NOTE — ED PROVIDER NOTE - NSICDXPASTMEDICALHX_GEN_ALL_CORE_FT
PAST MEDICAL HISTORY:  Bicornuate uterus     Cerebrovascular dural AV fistula     Migraine headache     Obesity     PCOS (polycystic ovarian syndrome)     Seizures     TIA (transient ischemic attack) [facility]-4/2022-inf lat visual field loss

## 2023-10-20 ENCOUNTER — APPOINTMENT (OUTPATIENT)
Dept: SURGERY | Facility: CLINIC | Age: 33
End: 2023-10-20

## 2023-10-20 ENCOUNTER — NON-APPOINTMENT (OUTPATIENT)
Age: 33
End: 2023-10-20

## 2023-10-20 LAB
ALBUMIN SERPL ELPH-MCNC: 4.3 G/DL
ALP BLD-CCNC: 101 U/L
ALT SERPL-CCNC: 72 U/L
AMMONIA PLAS-MCNC: 51 UMOL/L
ANION GAP SERPL CALC-SCNC: 13 MMOL/L
AST SERPL-CCNC: 66 U/L
BILIRUB SERPL-MCNC: 0.4 MG/DL
BUN SERPL-MCNC: 13 MG/DL
CALCIUM SERPL-MCNC: 9.8 MG/DL
CHLORIDE SERPL-SCNC: 100 MMOL/L
CO2 SERPL-SCNC: 26 MMOL/L
CREAT SERPL-MCNC: 0.8 MG/DL
EGFR: 98 ML/MIN/1.73M2
GLUCOSE SERPL-MCNC: 92 MG/DL
HCT VFR BLD CALC: 43.1 %
HGB BLD-MCNC: 14.2 G/DL
MAGNESIUM SERPL-MCNC: 2 MG/DL
MCHC RBC-ENTMCNC: 28.5 PG
MCHC RBC-ENTMCNC: 32.9 G/DL
MCV RBC AUTO: 86.5 FL
PLATELET # BLD AUTO: 429 K/UL
PMV BLD: 9.8 FL
POTASSIUM SERPL-SCNC: 5.1 MMOL/L
PROT SERPL-MCNC: 7.1 G/DL
RBC # BLD: 4.98 M/UL
RBC # FLD: 13.2 %
SODIUM SERPL-SCNC: 139 MMOL/L
VALPROATE SERPL-MCNC: 48 UG/ML
WBC # FLD AUTO: 9.32 K/UL

## 2023-10-27 ENCOUNTER — APPOINTMENT (OUTPATIENT)
Dept: NEUROLOGY | Facility: CLINIC | Age: 33
End: 2023-10-27

## 2023-11-16 ENCOUNTER — APPOINTMENT (OUTPATIENT)
Dept: OBGYN | Facility: CLINIC | Age: 33
End: 2023-11-16

## 2024-01-16 NOTE — ED PROVIDER NOTE - ATTENDING SHARED VISIT SELECTORS
History/Exam/Medical Decision Making Patient's visit was conducted through [employer] video telecommunication. Patient consented before the start of visit as to understanding of privacy concerns, possible technological failure, and their responsibility of carrying out instructions of plan.

## 2024-01-18 ENCOUNTER — APPOINTMENT (OUTPATIENT)
Dept: OBGYN | Facility: CLINIC | Age: 34
End: 2024-01-18

## 2024-01-18 ENCOUNTER — APPOINTMENT (OUTPATIENT)
Dept: OBGYN | Facility: CLINIC | Age: 34
End: 2024-01-18
Payer: MEDICAID

## 2024-01-18 VITALS
HEIGHT: 62 IN | WEIGHT: 200 LBS | HEART RATE: 98 BPM | DIASTOLIC BLOOD PRESSURE: 81 MMHG | SYSTOLIC BLOOD PRESSURE: 122 MMHG | BODY MASS INDEX: 36.8 KG/M2

## 2024-01-18 DIAGNOSIS — L03.119 CELLULITIS OF UNSPECIFIED PART OF LIMB: ICD-10-CM

## 2024-01-18 DIAGNOSIS — Z01.419 ENCOUNTER FOR GYNECOLOGICAL EXAMINATION (GENERAL) (ROUTINE) W/OUT ABNORMAL FINDINGS: ICD-10-CM

## 2024-01-18 LAB
BILIRUB UR QL STRIP: NORMAL
CLARITY UR: CLEAR
COLLECTION METHOD: NORMAL
GLUCOSE UR-MCNC: NORMAL
HCG UR QL: 0.2 EU/DL
HGB UR QL STRIP.AUTO: NORMAL
KETONES UR-MCNC: 1.03
LEUKOCYTE ESTERASE UR QL STRIP: NORMAL
NITRITE UR QL STRIP: NORMAL
PH UR STRIP: 5.5
PROT UR STRIP-MCNC: 100
SP GR UR STRIP: 1.03

## 2024-01-18 PROCEDURE — 81003 URINALYSIS AUTO W/O SCOPE: CPT | Mod: QW

## 2024-01-18 PROCEDURE — 99385 PREV VISIT NEW AGE 18-39: CPT

## 2024-01-18 PROCEDURE — 99203 OFFICE O/P NEW LOW 30 MIN: CPT | Mod: 25

## 2024-01-18 RX ORDER — UBROGEPANT 100 MG/1
100 TABLET ORAL
Qty: 30 | Refills: 3 | Status: ACTIVE | COMMUNITY
Start: 2022-05-25

## 2024-01-18 RX ORDER — MUPIROCIN 20 MG/G
2 OINTMENT TOPICAL
Qty: 22 | Refills: 4 | Status: ACTIVE | COMMUNITY
Start: 2024-01-18 | End: 1900-01-01

## 2024-01-18 NOTE — PLAN
[FreeTextEntry1] : right inner thigh a round area of erythema 4 cm diameter with a central non draining old abscess and indurated. consistent with cellulitis. Oral antibiotics and local antibiotics prescribed and warm soaks

## 2024-01-25 LAB
CYTOLOGY CVX/VAG DOC THIN PREP: NORMAL
HPV HIGH+LOW RISK DNA PNL CVX: NOT DETECTED

## 2024-02-14 NOTE — PATIENT PROFILE ADULT - DOES PATIENT HAVE ADVANCE DIRECTIVE
No

## 2024-04-30 ENCOUNTER — NON-APPOINTMENT (OUTPATIENT)
Age: 34
End: 2024-04-30

## 2024-05-01 ENCOUNTER — LABORATORY RESULT (OUTPATIENT)
Age: 34
End: 2024-05-01

## 2024-05-01 ENCOUNTER — APPOINTMENT (OUTPATIENT)
Dept: NEUROLOGY | Facility: CLINIC | Age: 34
End: 2024-05-01
Payer: MEDICAID

## 2024-05-01 VITALS
DIASTOLIC BLOOD PRESSURE: 69 MMHG | BODY MASS INDEX: 36.8 KG/M2 | SYSTOLIC BLOOD PRESSURE: 99 MMHG | HEART RATE: 77 BPM | HEIGHT: 62 IN | WEIGHT: 200 LBS

## 2024-05-01 DIAGNOSIS — R56.9 UNSPECIFIED CONVULSIONS: ICD-10-CM

## 2024-05-01 PROCEDURE — 99214 OFFICE O/P EST MOD 30 MIN: CPT

## 2024-05-01 PROCEDURE — G2211 COMPLEX E/M VISIT ADD ON: CPT | Mod: NC,1L

## 2024-05-01 RX ORDER — DIVALPROEX SODIUM 500 MG/1
500 TABLET, DELAYED RELEASE ORAL TWICE DAILY
Qty: 60 | Refills: 3 | Status: DISCONTINUED | COMMUNITY
End: 2024-05-01

## 2024-05-01 RX ORDER — DICLOXACILLIN SODIUM 500 MG/1
500 CAPSULE ORAL 4 TIMES DAILY
Qty: 40 | Refills: 1 | Status: DISCONTINUED | COMMUNITY
Start: 2024-01-18 | End: 2024-05-01

## 2024-05-01 NOTE — PHYSICAL EXAM
[FreeTextEntry1] :  Mental status: Awake, alert and oriented x3.  Recent and remote memory intact.  Naming, repetition and comprehension intact.  Attention/concentration intact.  No dysarthria, no aphasia.  Fund of knowledge appropriate.  Cranial nerves: Pupils equally round and reactive to light, visual fields full, no nystagmus, extraocular muscles intact, V1 through V3 intact bilaterally and symmetric, face symmetric, hearing intact to finger rub, palate elevation symmetric, tongue was midline.  Motor:  MRC grading 5/5 b/l UE/LE.   strength 5/5.  Normal tone and bulk.  No abnormal movements.  Sensation: Intact to light touch, proprioception, and pinprick.  Coordination: No dysmetria on finger-to-nose and heel-to-shin.  No dysdiadokinesia.  Reflexes: 2+ in bilateral UE/LE, downgoing toes bilaterally. (-) Meza.  Gait: Narrow and steady. No ataxia.  Romberg negative

## 2024-05-01 NOTE — HISTORY OF PRESENT ILLNESS
[FreeTextEntry1] : Patient is 34 yo RH woman, never smoker, with FMHx of seizures, Hemophilia A and PMHx of ophthalmic migraines, pre-eclampsia (normotensive afterwards w/o meds) and PCOS with finding of R occipital IPH found during work up for severe unusual HA in 4/2022. She has 2 diagnostic cerebral angiograms with Dr. Castro, one on 4/28/22 and one on 6/16/22. The repeat angiogram in 6/2022 showed small early shunting of the right parieto-occipital artery which seemed WORSE in comparison to the previous cerebral DSA. 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 Gonzalez on 8/29/22. Patient had a DSA in 7/2023 with Dr. Borja for her AVM. Today patient present to the office today for a f/u visit for migraines and seizures. She states she is feeling well overall, denies any seizures or seizure like activity. Admits to still having migraines. Takes all her medications as prescribed. Currently on Depakote 750 mg ER twice daily.

## 2024-05-01 NOTE — ASSESSMENT
[FreeTextEntry1] : Mrs. Benítez is a 33-year-old female with a PMHx of seizures, Hemophilia A and PMHx of ophthalmic migraines, pre-eclampsia (normotensive afterwards w/o meds) and PCOS and R occipital IPH presents today to the office for f/u for migraines and seizures.  #Opthalmlic Migraines  #Partial Seizures   PLAN:  1. Continue Depakote ER 750mg BID  2. Routine EEG in 3 months  3. Monitor BP at home  4. Ubrelvy PRN for migraines  5. Serology ordered: CBC, CMP, Lipid profile, b12, folate, Vit D, Valproic acid level   6. RTC 6 months

## 2024-05-07 RX ORDER — DIVALPROEX SODIUM 500 1/1
500 TABLET, EXTENDED RELEASE ORAL
Qty: 60 | Refills: 5 | Status: ACTIVE | COMMUNITY
Start: 2023-05-03 | End: 1900-01-01

## 2024-05-07 RX ORDER — MAGNESIUM OXIDE 241.3 MG/1000MG
400 TABLET ORAL TWICE DAILY
Qty: 60 | Refills: 0 | Status: ACTIVE | COMMUNITY
Start: 2023-07-19 | End: 1900-01-01

## 2024-05-07 RX ORDER — DIVALPROEX SODIUM 250 MG/1
250 TABLET, EXTENDED RELEASE ORAL
Qty: 60 | Refills: 5 | Status: ACTIVE | COMMUNITY
Start: 2023-05-03 | End: 1900-01-01

## 2024-05-24 ENCOUNTER — EMERGENCY (EMERGENCY)
Facility: HOSPITAL | Age: 34
LOS: 0 days | Discharge: HOME | End: 2024-05-25
Attending: EMERGENCY MEDICINE
Payer: MEDICAID

## 2024-05-24 VITALS
DIASTOLIC BLOOD PRESSURE: 75 MMHG | RESPIRATION RATE: 18 BRPM | HEART RATE: 94 BPM | SYSTOLIC BLOOD PRESSURE: 141 MMHG | OXYGEN SATURATION: 95 %

## 2024-05-24 VITALS
TEMPERATURE: 98 F | DIASTOLIC BLOOD PRESSURE: 78 MMHG | RESPIRATION RATE: 17 BRPM | OXYGEN SATURATION: 100 % | HEART RATE: 114 BPM | WEIGHT: 220.02 LBS | SYSTOLIC BLOOD PRESSURE: 137 MMHG

## 2024-05-24 DIAGNOSIS — Z86.69 PERSONAL HISTORY OF OTHER DISEASES OF THE NERVOUS SYSTEM AND SENSE ORGANS: ICD-10-CM

## 2024-05-24 DIAGNOSIS — R51.9 HEADACHE, UNSPECIFIED: ICD-10-CM

## 2024-05-24 DIAGNOSIS — R11.0 NAUSEA: ICD-10-CM

## 2024-05-24 DIAGNOSIS — Z98.890 OTHER SPECIFIED POSTPROCEDURAL STATES: Chronic | ICD-10-CM

## 2024-05-24 DIAGNOSIS — Z86.79 PERSONAL HISTORY OF OTHER DISEASES OF THE CIRCULATORY SYSTEM: Chronic | ICD-10-CM

## 2024-05-24 LAB
ALBUMIN SERPL ELPH-MCNC: 4.5 G/DL — SIGNIFICANT CHANGE UP (ref 3.5–5.2)
ALP SERPL-CCNC: 104 U/L — SIGNIFICANT CHANGE UP (ref 30–115)
ALT FLD-CCNC: 29 U/L — SIGNIFICANT CHANGE UP (ref 0–41)
ANION GAP SERPL CALC-SCNC: 9 MMOL/L — SIGNIFICANT CHANGE UP (ref 7–14)
APTT BLD: 32.5 SEC — SIGNIFICANT CHANGE UP (ref 27–39.2)
AST SERPL-CCNC: 21 U/L — SIGNIFICANT CHANGE UP (ref 0–41)
BASOPHILS # BLD AUTO: 0.06 K/UL — SIGNIFICANT CHANGE UP (ref 0–0.2)
BASOPHILS NFR BLD AUTO: 0.4 % — SIGNIFICANT CHANGE UP (ref 0–1)
BILIRUB SERPL-MCNC: 0.3 MG/DL — SIGNIFICANT CHANGE UP (ref 0.2–1.2)
BLD GP AB SCN SERPL QL: SIGNIFICANT CHANGE UP
BUN SERPL-MCNC: 11 MG/DL — SIGNIFICANT CHANGE UP (ref 10–20)
CALCIUM SERPL-MCNC: 9.9 MG/DL — SIGNIFICANT CHANGE UP (ref 8.4–10.5)
CHLORIDE SERPL-SCNC: 103 MMOL/L — SIGNIFICANT CHANGE UP (ref 98–110)
CO2 SERPL-SCNC: 30 MMOL/L — SIGNIFICANT CHANGE UP (ref 17–32)
CREAT SERPL-MCNC: 0.8 MG/DL — SIGNIFICANT CHANGE UP (ref 0.7–1.5)
EGFR: 98 ML/MIN/1.73M2 — SIGNIFICANT CHANGE UP
EOSINOPHIL # BLD AUTO: 0.17 K/UL — SIGNIFICANT CHANGE UP (ref 0–0.7)
EOSINOPHIL NFR BLD AUTO: 1.2 % — SIGNIFICANT CHANGE UP (ref 0–8)
GLUCOSE SERPL-MCNC: 104 MG/DL — HIGH (ref 70–99)
HCG SERPL QL: NEGATIVE — SIGNIFICANT CHANGE UP
HCT VFR BLD CALC: 42.5 % — SIGNIFICANT CHANGE UP (ref 37–47)
HGB BLD-MCNC: 14.4 G/DL — SIGNIFICANT CHANGE UP (ref 12–16)
IMM GRANULOCYTES NFR BLD AUTO: 0.4 % — HIGH (ref 0.1–0.3)
INR BLD: 1.01 RATIO — SIGNIFICANT CHANGE UP (ref 0.65–1.3)
LYMPHOCYTES # BLD AUTO: 18.9 % — LOW (ref 20.5–51.1)
LYMPHOCYTES # BLD AUTO: 2.63 K/UL — SIGNIFICANT CHANGE UP (ref 1.2–3.4)
MCHC RBC-ENTMCNC: 28.2 PG — SIGNIFICANT CHANGE UP (ref 27–31)
MCHC RBC-ENTMCNC: 33.9 G/DL — SIGNIFICANT CHANGE UP (ref 32–37)
MCV RBC AUTO: 83.3 FL — SIGNIFICANT CHANGE UP (ref 81–99)
MONOCYTES # BLD AUTO: 1.01 K/UL — HIGH (ref 0.1–0.6)
MONOCYTES NFR BLD AUTO: 7.3 % — SIGNIFICANT CHANGE UP (ref 1.7–9.3)
NEUTROPHILS # BLD AUTO: 10 K/UL — HIGH (ref 1.4–6.5)
NEUTROPHILS NFR BLD AUTO: 71.8 % — SIGNIFICANT CHANGE UP (ref 42.2–75.2)
NRBC # BLD: 0 /100 WBCS — SIGNIFICANT CHANGE UP (ref 0–0)
PLATELET # BLD AUTO: 493 K/UL — HIGH (ref 130–400)
PMV BLD: 9 FL — SIGNIFICANT CHANGE UP (ref 7.4–10.4)
POTASSIUM SERPL-MCNC: 4.7 MMOL/L — SIGNIFICANT CHANGE UP (ref 3.5–5)
POTASSIUM SERPL-SCNC: 4.7 MMOL/L — SIGNIFICANT CHANGE UP (ref 3.5–5)
PROT SERPL-MCNC: 7.3 G/DL — SIGNIFICANT CHANGE UP (ref 6–8)
PROTHROM AB SERPL-ACNC: 11.5 SEC — SIGNIFICANT CHANGE UP (ref 9.95–12.87)
RBC # BLD: 5.1 M/UL — SIGNIFICANT CHANGE UP (ref 4.2–5.4)
RBC # FLD: 12.8 % — SIGNIFICANT CHANGE UP (ref 11.5–14.5)
SODIUM SERPL-SCNC: 142 MMOL/L — SIGNIFICANT CHANGE UP (ref 135–146)
TROPONIN T, HIGH SENSITIVITY RESULT: <6 NG/L — SIGNIFICANT CHANGE UP (ref 6–13)
WBC # BLD: 13.93 K/UL — HIGH (ref 4.8–10.8)
WBC # FLD AUTO: 13.93 K/UL — HIGH (ref 4.8–10.8)

## 2024-05-24 PROCEDURE — 86900 BLOOD TYPING SEROLOGIC ABO: CPT

## 2024-05-24 PROCEDURE — 70450 CT HEAD/BRAIN W/O DYE: CPT | Mod: MC

## 2024-05-24 PROCEDURE — 70498 CT ANGIOGRAPHY NECK: CPT | Mod: 26,MC

## 2024-05-24 PROCEDURE — 99285 EMERGENCY DEPT VISIT HI MDM: CPT | Mod: 25

## 2024-05-24 PROCEDURE — 85610 PROTHROMBIN TIME: CPT

## 2024-05-24 PROCEDURE — 36415 COLL VENOUS BLD VENIPUNCTURE: CPT

## 2024-05-24 PROCEDURE — 70496 CT ANGIOGRAPHY HEAD: CPT | Mod: 26,MC

## 2024-05-24 PROCEDURE — 85730 THROMBOPLASTIN TIME PARTIAL: CPT

## 2024-05-24 PROCEDURE — 70450 CT HEAD/BRAIN W/O DYE: CPT | Mod: 26,59,MC

## 2024-05-24 PROCEDURE — 84703 CHORIONIC GONADOTROPIN ASSAY: CPT

## 2024-05-24 PROCEDURE — 80053 COMPREHEN METABOLIC PANEL: CPT

## 2024-05-24 PROCEDURE — 70498 CT ANGIOGRAPHY NECK: CPT | Mod: MC

## 2024-05-24 PROCEDURE — 96375 TX/PRO/DX INJ NEW DRUG ADDON: CPT | Mod: XU

## 2024-05-24 PROCEDURE — 96374 THER/PROPH/DIAG INJ IV PUSH: CPT | Mod: XU

## 2024-05-24 PROCEDURE — 70496 CT ANGIOGRAPHY HEAD: CPT | Mod: MC

## 2024-05-24 PROCEDURE — 84484 ASSAY OF TROPONIN QUANT: CPT

## 2024-05-24 PROCEDURE — 86850 RBC ANTIBODY SCREEN: CPT

## 2024-05-24 PROCEDURE — 86901 BLOOD TYPING SEROLOGIC RH(D): CPT

## 2024-05-24 PROCEDURE — 93010 ELECTROCARDIOGRAM REPORT: CPT

## 2024-05-24 PROCEDURE — 93005 ELECTROCARDIOGRAM TRACING: CPT

## 2024-05-24 PROCEDURE — 99285 EMERGENCY DEPT VISIT HI MDM: CPT

## 2024-05-24 PROCEDURE — 85025 COMPLETE CBC W/AUTO DIFF WBC: CPT

## 2024-05-24 RX ORDER — MORPHINE SULFATE 50 MG/1
4 CAPSULE, EXTENDED RELEASE ORAL ONCE
Refills: 0 | Status: DISCONTINUED | OUTPATIENT
Start: 2024-05-24 | End: 2024-05-24

## 2024-05-24 RX ORDER — ACETAMINOPHEN 500 MG
975 TABLET ORAL ONCE
Refills: 0 | Status: COMPLETED | OUTPATIENT
Start: 2024-05-24 | End: 2024-05-24

## 2024-05-24 RX ADMIN — MORPHINE SULFATE 4 MILLIGRAM(S): 50 CAPSULE, EXTENDED RELEASE ORAL at 22:02

## 2024-05-24 NOTE — ED ADULT TRIAGE NOTE - CHIEF COMPLAINT QUOTE
pt c/o R side head pain stabbing, L eye vision blurriness, hx of brain bleed, , Started Sunday  Md Hunt made aware

## 2024-05-24 NOTE — ED ADULT NURSE NOTE - NSICDXPASTMEDICALHX_GEN_ALL_CORE_FT
PAST MEDICAL HISTORY:  Bicornuate uterus     Cerebrovascular dural AV fistula     Migraine headache     Obesity     PCOS (polycystic ovarian syndrome)     Seizures     TIA (transient ischemic attack) [facility]-4/2022-inf lat visual field loss

## 2024-05-25 RX ORDER — SODIUM CHLORIDE 9 MG/ML
1000 INJECTION, SOLUTION INTRAVENOUS ONCE
Refills: 0 | Status: COMPLETED | OUTPATIENT
Start: 2024-05-25 | End: 2024-05-25

## 2024-05-25 RX ORDER — METOCLOPRAMIDE HCL 10 MG
10 TABLET ORAL ONCE
Refills: 0 | Status: COMPLETED | OUTPATIENT
Start: 2024-05-25 | End: 2024-05-25

## 2024-05-25 RX ORDER — DIPHENHYDRAMINE HCL 50 MG
50 CAPSULE ORAL ONCE
Refills: 0 | Status: COMPLETED | OUTPATIENT
Start: 2024-05-25 | End: 2024-05-25

## 2024-05-25 RX ADMIN — Medication 10 MILLIGRAM(S): at 01:04

## 2024-05-25 RX ADMIN — Medication 50 MILLIGRAM(S): at 01:03

## 2024-05-25 RX ADMIN — SODIUM CHLORIDE 1000 MILLILITER(S): 9 INJECTION, SOLUTION INTRAVENOUS at 01:03

## 2024-05-25 NOTE — ED PROVIDER NOTE - OBJECTIVE STATEMENT
43-year-old female history of prior AVM rupture/ICH status post embolization 2 years ago with baseline left eye vision deficit presenting for evaluation of right-sided headache since Sunday.  Right-sided headache with associated nausea, left eye vision change.  Took Ubrelvy which typically resolves her migraines with no improvement.  Also took Tylenol this morning with no improvement.  No numbness/focal weakness.  No other acute complaints.

## 2024-05-25 NOTE — ED PROVIDER NOTE - NSTIMEPROVIDERCAREINITIATE_GEN_ER
Spoke with mother --- pt has a dry rash on face for 4 days , mother wants apt --- apt made for 130pm  Today in the [facility] 24-May-2024 21:23

## 2024-05-25 NOTE — ED PROVIDER NOTE - NSICDXPASTMEDICALHX_GEN_ALL_CORE_FT
PAST MEDICAL HISTORY:  Bicornuate uterus     Cerebrovascular dural AV fistula     Migraine headache     Obesity     PCOS (polycystic ovarian syndrome)     Seizures     TIA (transient ischemic attack) [facility]-4/2022-inf lat visual field loss

## 2024-05-25 NOTE — ED PROVIDER NOTE - CLINICAL SUMMARY MEDICAL DECISION MAKING FREE TEXT BOX
43-year-old female history of prior AVM rupture/ICH status post embolization 2 years ago with baseline left eye vision deficit presenting for evaluation of right-sided headache since Sunday.  Right-sided headache with associated nausea, left eye vision change.  Took Ubrelvy which typically resolves her migraines with no improvement.  Also took Tylenol this morning with no improvement.  No numbness/focal weakness.  No other acute complaints.Labs EKG imaging reviewed.  Patient feeling improved.  Neuroexam at baseline.  Discussed with Dr. Vasquez, not concerned about preliminary CTA results at this time.  [facility] and [facility]up with [facility] next week.  Aware of all results, given a copy of all available results, comfortable with discharge and follow-up outpatient, strict return precautions given. Endorses understanding and aware they can return at any time for further evaluation. No further questions or concerns at this time.

## 2024-05-25 NOTE — ED PROVIDER NOTE - PATIENT PORTAL LINK FT
You can access the FollowMyHealth Patient Portal offered by [facility] by registering at the following website: [email or web address]. By joining [employer]’s FollowMyHealth portal, you will also be able to view your health information using other applications (apps) compatible with our system.

## 2024-05-25 NOTE — ED PROVIDER NOTE - NSFOLLOWUPINSTRUCTIONS_ED_ALL_ED_FT
Please follow-up with neurology, return for any new or concerning symptoms.    Headache    A headache is pain or discomfort felt around the head or neck area. The specific cause of a headache may not be found as there are many types including tension headaches, migraine headaches, and cluster headaches. Watch your condition for any changes. Things you can do to manage your pain include taking over the counter and prescription medications as instructed by your health care provider, lying down in a dark quiet room, limiting stress, getting regular sleep, and refraining from alcohol and tobacco products.    SEEK IMMEDIATE MEDICAL CARE IF YOU EXPERIENCE THE FOLLOWING SYMPTOMS: fever, vomiting, stiff neck, loss of vision, problems with speech, muscle weakness, loss of balance, trouble walking, pass out, or confusion.

## 2024-05-25 NOTE — ED PROVIDER NOTE - PHYSICAL EXAMINATION
Constitutional: Uncomfortable appearing. Non toxic.   Eyes: PERRLA. Extraocular movements intact, no entrapment. Conjunctiva normal.   ENT: No nasal discharge. Moist mucus membranes.  Neck: Supple, non tender, full range of motion.  CV: RRR no murmurs, rubs, or gallops. +S1S2.   Pulm: Clear to auscultation bilaterally. Normal work of breathing.  Abd: soft NT ND +BS.   Ext: Warm and well perfused x4, moving all extremities, no edema.   Psy: Cooperative, appropriate.   Skin: Warm, dry, no rash  Neuro: Baseline left lower quadrant left eye vision cut, otherwise CN2-12 grossly intact no sensory or motor deficits throughout, no drift

## 2024-06-04 ENCOUNTER — APPOINTMENT (OUTPATIENT)
Dept: NEUROLOGY | Facility: CLINIC | Age: 34
End: 2024-06-04
Payer: MEDICAID

## 2024-06-04 VITALS
HEART RATE: 75 BPM | HEIGHT: 62 IN | SYSTOLIC BLOOD PRESSURE: 117 MMHG | DIASTOLIC BLOOD PRESSURE: 73 MMHG | BODY MASS INDEX: 36.8 KG/M2 | WEIGHT: 200 LBS

## 2024-06-04 DIAGNOSIS — G43.909 MIGRAINE, UNSPECIFIED, NOT INTRACTABLE, W/OUT STATUS MIGRAINOSUS: ICD-10-CM

## 2024-06-04 PROCEDURE — G2211 COMPLEX E/M VISIT ADD ON: CPT | Mod: NC,1L

## 2024-06-04 PROCEDURE — 99214 OFFICE O/P EST MOD 30 MIN: CPT | Mod: GC

## 2024-06-04 RX ORDER — RIMEGEPANT SULFATE 75 MG/75MG
75 TABLET, ORALLY DISINTEGRATING ORAL DAILY
Qty: 30 | Refills: 2 | Status: DISCONTINUED | COMMUNITY
Start: 2022-12-20 | End: 2024-06-04

## 2024-06-04 RX ORDER — METHYLPREDNISOLONE 4 MG/1
4 TABLET ORAL
Qty: 1 | Refills: 0 | Status: DISCONTINUED | COMMUNITY
Start: 2024-05-28 | End: 2024-06-04

## 2024-06-04 NOTE — PHYSICAL EXAM
[FreeTextEntry1] :    Mental status: Awake, alert and oriented x3. Recent and remote memory intact. Naming, repetition and comprehension intact. Attention/concentration intact. No dysarthria, no aphasia. Fund of knowledge appropriate.  Cranial nerves: Pupils equally round and reactive to light, visual fields full, no nystagmus, extraocular muscles intact, V1 through V3 intact bilaterally and symmetric, face symmetric, hearing intact to finger rub, palate elevation symmetric, tongue was midline.  Motor: MRC grading 5/5 b/l UE/LE.  strength 5/5. Normal tone and bulk. No abnormal movements.  Sensation: Intact to light touch, proprioception, and pinprick.  Coordination: No dysmetria on finger-to-nose and heel-to-shin. No dysdiadokinesia.  Reflexes: 2+ in bilateral UE/LE, downgoing toes bilaterally. (-) Meza.  Gait: Narrow and steady. No ataxia. Romberg negative.

## 2024-06-04 NOTE — HISTORY OF PRESENT ILLNESS
[FreeTextEntry1] : Interval history: Patient was last seen in the office for a follow up visit for migraines on 5/1/24. Today patient presents for a hospital follow up visit on 5/25/24 for a right-sided headache with associated nausea, and left eye vision change. She took Ubrelvy before going to the ED which typically resolves her migraines and had no improvement. Patient had imaging done which were negative for any acute/new findings. She was d/c from ED the same day and told to f/u outpatient in office. Patient contacted our office on 5/28 due to migraines still being severe and was started on Methylprednisolone 4 mg therapy pack by Dr. Balderas on May 28th, 2024. Patient endorses the steroid pack relieved her migraines and today she presents feeling better and has no headache or migraine at this time.  Denies blurry vision, weakness, dizziness, or headaches at this time.  Patient endorses she would like a psychiatry referral due to feeling overwhelmed and anxious about her medical problems and wants to talk to someone about coping with them.  _____________________________________________________________________________________________________ HPI (5/1/24): Patient is 32 yo RH woman, never smoker, with FMHx of seizures, Hemophilia A and PMHx of ophthalmic migraines, pre-eclampsia (normotensive afterwards w/o meds) and PCOS with finding of R occipital IPH found during work up for severe unusual HA in 4/2022. She has 2 diagnostic cerebral angiograms with Dr. Castro, one on 4/28/22 and one on 6/16/22. The repeat angiogram in 6/2022 showed small early shunting of the right parieto-occipital artery which seemed WORSE in comparison to the previous cerebral DSA. 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 Gonzalez on 8/29/22. Patient had a DSA in 7/2023 with Dr. Borja for her AVM. Today patient present to the office today for a f/u visit for migraines and seizures. She states she is feeling well overall, denies any seizures or seizure like activity. Admits to still having migraines. Takes all her medications as prescribed. Currently on Depakote 750 mg ER twice daily

## 2024-06-04 NOTE — REVIEW OF SYSTEMS
[As Noted in HPI] : as noted in HPI [Anxiety] : anxiety [Depression] : depression [Negative] : Constitutional

## 2024-06-04 NOTE — ASSESSMENT
[FreeTextEntry1] : Mrs. Benítez is a 33-year-old female with a PMHx of seizures, Hemophilia A and PMHx of ophthalmic migraines, pre-eclampsia (normotensive afterwards w/o meds) and PCOS and R occipital IPH presents today to the office for f/u after hospital visit due to uncontrolled migraines.   #Opthalmlic Migraines - Patient received Methylprednisolone 4 mg therapy pack which stopped the migraines    PLAN:  1. Continue Depakote ER 750mg BID for hx of partial seizures  2. C/w  Ubrelvy PRN for migraines 3. Psych referral  4. RTC 4 months

## 2024-06-07 ENCOUNTER — APPOINTMENT (OUTPATIENT)
Dept: NEUROLOGY | Facility: CLINIC | Age: 34
End: 2024-06-07
Payer: MEDICAID

## 2024-06-07 PROCEDURE — 95816 EEG AWAKE AND DROWSY: CPT

## 2024-06-26 ENCOUNTER — NON-APPOINTMENT (OUTPATIENT)
Age: 34
End: 2024-06-26

## 2024-10-08 NOTE — ED ADULT TRIAGE NOTE - PATIENT ON (OXYGEN DELIVERY METHOD)
Pt cleared for discharge by Emilie VELEZ. This RN provided pt with discharge teaching, pt verbalized understanding of information. VS obtained prior to dispo, peripheral line removed. Left via wheelchair pushed by son. Confirmed pharmacy. No further questions regarding discharge.   room air

## 2024-10-17 ENCOUNTER — EMERGENCY (EMERGENCY)
Facility: HOSPITAL | Age: 34
LOS: 0 days | Discharge: HOME | End: 2024-10-17
Attending: EMERGENCY MEDICINE
Payer: MEDICAID

## 2024-10-17 ENCOUNTER — NON-APPOINTMENT (OUTPATIENT)
Age: 34
End: 2024-10-17

## 2024-10-17 VITALS — DIASTOLIC BLOOD PRESSURE: 82 MMHG | SYSTOLIC BLOOD PRESSURE: 120 MMHG

## 2024-10-17 VITALS
DIASTOLIC BLOOD PRESSURE: 93 MMHG | RESPIRATION RATE: 19 BRPM | HEART RATE: 101 BPM | TEMPERATURE: 98 F | HEIGHT: 61 IN | SYSTOLIC BLOOD PRESSURE: 167 MMHG | WEIGHT: 199.96 LBS

## 2024-10-17 DIAGNOSIS — H53.8 OTHER VISUAL DISTURBANCES: ICD-10-CM

## 2024-10-17 DIAGNOSIS — Z98.890 OTHER SPECIFIED POSTPROCEDURAL STATES: Chronic | ICD-10-CM

## 2024-10-17 DIAGNOSIS — R51.9 HEADACHE, UNSPECIFIED: ICD-10-CM

## 2024-10-17 DIAGNOSIS — Z86.79 PERSONAL HISTORY OF OTHER DISEASES OF THE CIRCULATORY SYSTEM: Chronic | ICD-10-CM

## 2024-10-17 PROCEDURE — 96372 THER/PROPH/DIAG INJ SC/IM: CPT

## 2024-10-17 PROCEDURE — 82962 GLUCOSE BLOOD TEST: CPT

## 2024-10-17 PROCEDURE — 70450 CT HEAD/BRAIN W/O DYE: CPT | Mod: 26,MC

## 2024-10-17 PROCEDURE — 70450 CT HEAD/BRAIN W/O DYE: CPT | Mod: MC

## 2024-10-17 PROCEDURE — 99284 EMERGENCY DEPT VISIT MOD MDM: CPT | Mod: 25

## 2024-10-17 PROCEDURE — 99284 EMERGENCY DEPT VISIT MOD MDM: CPT

## 2024-10-17 RX ORDER — DIAZEPAM 10 MG/1
5 TABLET ORAL ONCE
Refills: 0 | Status: DISCONTINUED | OUTPATIENT
Start: 2024-10-17 | End: 2024-10-17

## 2024-10-17 RX ADMIN — DIAZEPAM 5 MILLIGRAM(S): 10 TABLET ORAL at 19:24

## 2024-10-17 RX ADMIN — Medication 10 MILLIGRAM(S): at 19:24

## 2024-10-17 NOTE — ED PROVIDER NOTE - PHYSICAL EXAMINATION
Vital Signs: I have reviewed the initial vital signs.  Constitutional: well-nourished, appears stated age, no acute distress  Head: atraumatic and normocephalic  Eyes:PERRLA, EOMI, no nystagmus, clear conjunctiva  ENT:  MMM  Cardiovascular: regular rate, regular rhythm, well-perfused extremities  Respiratory: unlabored respiratory effort, clear to auscultation bilaterally  Neuro: awake, alert, follows commands, oriented, no focal deficits  ;

## 2024-10-17 NOTE — ED PROVIDER NOTE - NSICDXPASTMEDICALHX_GEN_ALL_CORE_FT
PAST MEDICAL HISTORY:  Bicornuate uterus     Cerebrovascular dural AV fistula     Migraine headache     Obesity     PCOS (polycystic ovarian syndrome)     Seizures     TIA (transient ischemic attack) [facility]-4/2022-inf lat visual field loss

## 2024-10-17 NOTE — ED PROVIDER NOTE - OBJECTIVE STATEMENT
33 y/o female with hx of AVF with hemorrhage 2 yrs ago s/p repair, migraines presents to the ED with left episodic left sided visual changes with associated headaches over past few months. patient with relief of symptoms with prednisone. patient denies any neck or back pain . no relief of symptoms with migraine meds. no photophobia or nausea.

## 2024-10-17 NOTE — ED ADULT NURSE NOTE - NSICDXPASTMEDICALHX_GEN_ALL_CORE_FT
PAST MEDICAL HISTORY:  Bicornuate uterus     Cerebrovascular dural AV fistula     Migraine headache     Obesity     PCOS (polycystic ovarian syndrome)     Seizures     TIA (transient ischemic attack) [facility]-4/2022-inf lat visual field loss

## 2024-10-17 NOTE — ED PROVIDER NOTE - PATIENT PORTAL LINK FT
You can access the FollowMyHealth Patient Portal offered by [facility] by registering at the following website: [email or web address]. By joining [employer]’s FollowMyHealth portal, you will also be able to view your health information using other applications (apps) compatible with our system.

## 2024-10-17 NOTE — ED PROVIDER NOTE - CLINICAL SUMMARY MEDICAL DECISION MAKING FREE TEXT BOX
pt sent by her neurologist for imaging, has h/o hemophilia and ich with residual migraines and visual disturbance, has been having worsening symptoms so referred to ED given her hx, on exam vital signs appreciated, tearful but nontoxic, head nc/at, perrla, neck supple no meningismus, neuro intact, imaging appreciated, medicated with improvement, will d/c to f/u with her neuro as scheduled. Patient counseled regarding conditions which should prompt return.

## 2024-10-17 NOTE — ED ADULT NURSE NOTE - NS PRO PASSIVE SMOKE EXP
OPERATIVE NOTE      Chapito Calix  MRN:  [id number]     2007    DATE OF SURGERY:  2018       PRE-OPERATIVE DX:    Acute Appendicitis    POST-OPERATIVE DX:  same    PROCEDURE(S) PERFORMED:   Laparoscopic appendectomy    ATTENDING SURGEON:  Varinder Lan MD    ASSISTANT SURGEON:  Eulalio Butcher MS 2    ANESTHESIA:  General    ANESTHESIOLOGIST:  Patrick Abdi MD    INDICATIONS;  The patient is a 10 year old -year-old who presented with abdominal pain. History, exam, and workup were consistent with appendicitis. The patient was brought to the operating room for surgical management.    FINDINGS;  Tip appendicitis     DESCRIPTION OF PROCEDURE:  Prior to going to the operating room a thorough discussion was had with the patient's parents. All questions were answered. All risks, benefits, and alternatives were explained. Informed consent was signed and in the chart. Patient was taken to the operating room and laid in the supine position on the operative table. TEDs and SCDs were confirmed to be in place. Cefoxitin was administered as a preoperative antibiotic. General endotracheal anesthesia was then induced. A timeout was had with all members of anesthesia and surgery staff, and in the patient's abdomen was then prepped and draped in usual sterile manner.    We began by first anesthetizing the skin cephalad to the umbilicus. A 50-50 mixture of 1% lidocaine and 0.5% Marcaine was used. A vertical incision was made, and the incision was deepend through subcutaneous layers using sharp and blunt dissection. The anterior abdominal fascia was identified, and this was then grasped between clamps, and elevated. The fascia was incised sharply in the midline, and the peritoneum was identified and entered sharply. Stay stitches were placed on the lateral aspects of the fascial incision with 0 Vicryl. 12 mm Scott trocar was in place, and the abdomen was insufflated to 15 cm water pressure.  I then proceeded to place  additional 5 mm trochars one in the lower midline and the other in the left lower quadrant through separate stab incisions all following the administration of local anesthetic mixture. Graspers were introduced and the cecum was retracted cranially. The appendix was visualized and it was inflamed. A window was created at the base of the appendix between the appendix and the mesoappendix. The appendiceal base was divided with a blue load FER stapler. The mesoappendix was divided with a white load FER stapler. The specimen was placed in a bag and delivered through the umbilical incision. The abdomen was reinspected, and it was irrigated. Hemostasis was excellent. The 5 mm trochars were then withdrawn under direct visualization, and then insufflation was evacuated. The fascia in the midline was reapproximated with 0 Vicryl figure-of-eight sutures. The skin was reapproximated with 4-0 Vicryl subcuticular suture. Sterile dressing was applied. The patient tolerated the procedure well and was transferred to PACU in stable condition. There are no complications to report, and all counts were correct ×2.    ESTIMATED BLOOD LOSS:   5ml    FLUIDS:  700 mL    URINE OUTPUT:  none    SPECIMEN:   ID Type Source Tests Collected by Time Destination   A : Appendix    Varinder Lan MD 2/6/2018 1649 Pathology         COMPLICATIONS:  None apparent    CONDITION:  stable      Varinder Lan MD  02/06/18  5:11 PM       Unknown

## 2024-10-25 ENCOUNTER — APPOINTMENT (OUTPATIENT)
Dept: NEUROLOGY | Facility: CLINIC | Age: 34
End: 2024-10-25
Payer: MEDICAID

## 2024-10-25 VITALS
BODY MASS INDEX: 36.8 KG/M2 | HEART RATE: 83 BPM | WEIGHT: 200 LBS | DIASTOLIC BLOOD PRESSURE: 84 MMHG | SYSTOLIC BLOOD PRESSURE: 120 MMHG | HEIGHT: 62 IN

## 2024-10-25 DIAGNOSIS — R56.9 UNSPECIFIED CONVULSIONS: ICD-10-CM

## 2024-10-25 DIAGNOSIS — G43.909 MIGRAINE, UNSPECIFIED, NOT INTRACTABLE, W/OUT STATUS MIGRAINOSUS: ICD-10-CM

## 2024-10-25 DIAGNOSIS — I63.9 CEREBRAL INFARCTION, UNSPECIFIED: ICD-10-CM

## 2024-10-25 PROCEDURE — 99215 OFFICE O/P EST HI 40 MIN: CPT

## 2024-10-25 PROCEDURE — G2211 COMPLEX E/M VISIT ADD ON: CPT | Mod: NC

## 2024-10-25 RX ORDER — DIAZEPAM 5 MG/1
TABLET ORAL
Refills: 0 | Status: ACTIVE | COMMUNITY

## 2024-10-25 RX ORDER — METHYLPREDNISOLONE 4 MG/1
4 TABLET ORAL
Qty: 1 | Refills: 0 | Status: ACTIVE | COMMUNITY
Start: 2024-10-25 | End: 1900-01-01

## 2024-12-30 ENCOUNTER — EMERGENCY (EMERGENCY)
Facility: HOSPITAL | Age: 34
LOS: 0 days | Discharge: HOME | End: 2024-12-30
Attending: STUDENT IN AN ORGANIZED HEALTH CARE EDUCATION/TRAINING PROGRAM
Payer: MEDICAID

## 2024-12-30 VITALS
OXYGEN SATURATION: 98 % | DIASTOLIC BLOOD PRESSURE: 84 MMHG | RESPIRATION RATE: 18 BRPM | HEIGHT: 62 IN | WEIGHT: 199.96 LBS | SYSTOLIC BLOOD PRESSURE: 129 MMHG | TEMPERATURE: 99 F | HEART RATE: 90 BPM

## 2024-12-30 DIAGNOSIS — K08.89 OTHER SPECIFIED DISORDERS OF TEETH AND SUPPORTING STRUCTURES: ICD-10-CM

## 2024-12-30 DIAGNOSIS — Z86.73 PERSONAL HISTORY OF TRANSIENT ISCHEMIC ATTACK (TIA), AND CEREBRAL INFARCTION WITHOUT RESIDUAL DEFICITS: ICD-10-CM

## 2024-12-30 DIAGNOSIS — G40.909 EPILEPSY, UNSPECIFIED, NOT INTRACTABLE, WITHOUT STATUS EPILEPTICUS: ICD-10-CM

## 2024-12-30 DIAGNOSIS — E28.2 POLYCYSTIC OVARIAN SYNDROME: ICD-10-CM

## 2024-12-30 DIAGNOSIS — Z98.890 OTHER SPECIFIED POSTPROCEDURAL STATES: Chronic | ICD-10-CM

## 2024-12-30 DIAGNOSIS — Z86.79 PERSONAL HISTORY OF OTHER DISEASES OF THE CIRCULATORY SYSTEM: Chronic | ICD-10-CM

## 2024-12-30 LAB
ALBUMIN SERPL ELPH-MCNC: 4.4 G/DL — SIGNIFICANT CHANGE UP (ref 3.5–5.2)
ALP SERPL-CCNC: 111 U/L — SIGNIFICANT CHANGE UP (ref 30–115)
ALT FLD-CCNC: 21 U/L — SIGNIFICANT CHANGE UP (ref 0–41)
ANION GAP SERPL CALC-SCNC: 10 MMOL/L — SIGNIFICANT CHANGE UP (ref 7–14)
AST SERPL-CCNC: 18 U/L — SIGNIFICANT CHANGE UP (ref 0–41)
BASOPHILS # BLD AUTO: 0.09 K/UL — SIGNIFICANT CHANGE UP (ref 0–0.2)
BASOPHILS NFR BLD AUTO: 0.7 % — SIGNIFICANT CHANGE UP (ref 0–1)
BILIRUB SERPL-MCNC: 0.4 MG/DL — SIGNIFICANT CHANGE UP (ref 0.2–1.2)
BUN SERPL-MCNC: 12 MG/DL — SIGNIFICANT CHANGE UP (ref 10–20)
CALCIUM SERPL-MCNC: 9.6 MG/DL — SIGNIFICANT CHANGE UP (ref 8.4–10.5)
CHLORIDE SERPL-SCNC: 99 MMOL/L — SIGNIFICANT CHANGE UP (ref 98–110)
CO2 SERPL-SCNC: 28 MMOL/L — SIGNIFICANT CHANGE UP (ref 17–32)
CREAT SERPL-MCNC: 0.7 MG/DL — SIGNIFICANT CHANGE UP (ref 0.7–1.5)
EGFR: 115 ML/MIN/1.73M2 — SIGNIFICANT CHANGE UP
EOSINOPHIL # BLD AUTO: 0.68 K/UL — SIGNIFICANT CHANGE UP (ref 0–0.7)
EOSINOPHIL NFR BLD AUTO: 4.9 % — SIGNIFICANT CHANGE UP (ref 0–8)
GLUCOSE SERPL-MCNC: 91 MG/DL — SIGNIFICANT CHANGE UP (ref 70–99)
HCG SERPL QL: NEGATIVE — SIGNIFICANT CHANGE UP
HCT VFR BLD CALC: 41.8 % — SIGNIFICANT CHANGE UP (ref 37–47)
HGB BLD-MCNC: 14.2 G/DL — SIGNIFICANT CHANGE UP (ref 12–16)
IMM GRANULOCYTES NFR BLD AUTO: 0.4 % — HIGH (ref 0.1–0.3)
LYMPHOCYTES # BLD AUTO: 18.3 % — LOW (ref 20.5–51.1)
LYMPHOCYTES # BLD AUTO: 2.52 K/UL — SIGNIFICANT CHANGE UP (ref 1.2–3.4)
MCHC RBC-ENTMCNC: 28.1 PG — SIGNIFICANT CHANGE UP (ref 27–31)
MCHC RBC-ENTMCNC: 34 G/DL — SIGNIFICANT CHANGE UP (ref 32–37)
MCV RBC AUTO: 82.6 FL — SIGNIFICANT CHANGE UP (ref 81–99)
MONOCYTES # BLD AUTO: 0.63 K/UL — HIGH (ref 0.1–0.6)
MONOCYTES NFR BLD AUTO: 4.6 % — SIGNIFICANT CHANGE UP (ref 1.7–9.3)
NEUTROPHILS # BLD AUTO: 9.81 K/UL — HIGH (ref 1.4–6.5)
NEUTROPHILS NFR BLD AUTO: 71.1 % — SIGNIFICANT CHANGE UP (ref 42.2–75.2)
NRBC # BLD: 0 /100 WBCS — SIGNIFICANT CHANGE UP (ref 0–0)
PLATELET # BLD AUTO: 444 K/UL — HIGH (ref 130–400)
PMV BLD: 8.9 FL — SIGNIFICANT CHANGE UP (ref 7.4–10.4)
POTASSIUM SERPL-MCNC: 4.3 MMOL/L — SIGNIFICANT CHANGE UP (ref 3.5–5)
POTASSIUM SERPL-SCNC: 4.3 MMOL/L — SIGNIFICANT CHANGE UP (ref 3.5–5)
PROT SERPL-MCNC: 7 G/DL — SIGNIFICANT CHANGE UP (ref 6–8)
RBC # BLD: 5.06 M/UL — SIGNIFICANT CHANGE UP (ref 4.2–5.4)
RBC # FLD: 13.2 % — SIGNIFICANT CHANGE UP (ref 11.5–14.5)
SODIUM SERPL-SCNC: 137 MMOL/L — SIGNIFICANT CHANGE UP (ref 135–146)
WBC # BLD: 13.78 K/UL — HIGH (ref 4.8–10.8)
WBC # FLD AUTO: 13.78 K/UL — HIGH (ref 4.8–10.8)

## 2024-12-30 PROCEDURE — 99284 EMERGENCY DEPT VISIT MOD MDM: CPT | Mod: 25

## 2024-12-30 PROCEDURE — 85025 COMPLETE CBC W/AUTO DIFF WBC: CPT

## 2024-12-30 PROCEDURE — 96374 THER/PROPH/DIAG INJ IV PUSH: CPT | Mod: XU

## 2024-12-30 PROCEDURE — 70491 CT SOFT TISSUE NECK W/DYE: CPT | Mod: 26,MC

## 2024-12-30 PROCEDURE — 36415 COLL VENOUS BLD VENIPUNCTURE: CPT

## 2024-12-30 PROCEDURE — 80053 COMPREHEN METABOLIC PANEL: CPT

## 2024-12-30 PROCEDURE — 99285 EMERGENCY DEPT VISIT HI MDM: CPT

## 2024-12-30 PROCEDURE — 84703 CHORIONIC GONADOTROPIN ASSAY: CPT

## 2024-12-30 PROCEDURE — 70491 CT SOFT TISSUE NECK W/DYE: CPT | Mod: MC

## 2024-12-30 RX ORDER — AMOXICILLIN 250 MG
1 CAPSULE ORAL
Qty: 20 | Refills: 0
Start: 2024-12-30 | End: 2025-01-08

## 2024-12-30 RX ORDER — DEXAMETHASONE 1.5 MG/1
6 TABLET ORAL ONCE
Refills: 0 | Status: COMPLETED | OUTPATIENT
Start: 2024-12-30 | End: 2024-12-30

## 2024-12-30 RX ORDER — ACETAMINOPHEN 500MG 500 MG/1
975 TABLET, COATED ORAL ONCE
Refills: 0 | Status: COMPLETED | OUTPATIENT
Start: 2024-12-30 | End: 2024-12-30

## 2024-12-30 RX ADMIN — ACETAMINOPHEN 500MG 975 MILLIGRAM(S): 500 TABLET, COATED ORAL at 12:30

## 2024-12-30 RX ADMIN — DEXAMETHASONE 6 MILLIGRAM(S): 1.5 TABLET ORAL at 12:30

## 2024-12-30 NOTE — ED PROVIDER NOTE - PATIENT PORTAL LINK FT
You can access the FollowMyHealth Patient Portal offered by [facility] by registering at the following website: [email or web address]. By joining [employer]’s FollowMyHealth portal, you will also be able to view your health information using other applications (apps) compatible with our system.

## 2024-12-30 NOTE — ED ADULT TRIAGE NOTE - SPO2 (%)
98 Right ear hearing screen completed date: 2024  Right ear screen method: EOAE (evoked otoacoustic emission)  Right ear screen result: Passed  Right ear screen comment: N/A    Left ear hearing screen completed date: 2024  Left ear screen method: EOAE (evoked otoacoustic emission)  Left ear screen result: Passed  Left ear screen comments: N/A

## 2024-12-30 NOTE — ED PROVIDER NOTE - NSFOLLOWUPINSTRUCTIONS_ED_ALL_ED_FT
Our Emergency Department Referral Coordinators will be reaching out to you in the next 24-48 hours from 9:00am to 5:00pm with a follow up appointment. Please expect a phone call from the hospital in that time frame. If you do not receive a call or if you have any questions or concerns, you can reach them at   [phone]     Dental Pain    Dental pain (toothache) may be caused by many things including tooth decay (cavities or caries), abscess or infection, or trauma. If you were prescribed an antibiotic medicine, finish all of it even if you start to feel better. Rinsing your mouth with salt water or applying ice to the painful area of your face may help with the pain. Follow up with a dentist is important in ensuring good oral health and preventing the worsening of dental disease.    SEEK IMMEDIATE MEDICAL CARE IF YOU HAVE ANY OF THE FOLLOWING SYMPTOMS: unable to open your mouth, trouble breathing or swallowing, fever, or swelling of the face, neck, or jaw.

## 2024-12-30 NOTE — ED PROVIDER NOTE - CLINICAL SUMMARY MEDICAL DECISION MAKING FREE TEXT BOX
33 yo F presented to ED with dental pain. Labs were ordered and reviewed.  Imaging was ordered and reviewed by me.  No signs of abscess on CT imaging. Appropriate medications for patient's presenting complaints were ordered and effects were reassessed.  Patient's records (prior hospital, ED visit, and/or nursing home notes if available) were reviewed.  Additional history was obtained from EMS, family, and/or PCP (where available).  Escalation to admission/observation was considered.    However patient feels much better and is comfortable with discharge.  Appropriate follow-up was arranged. Return precautions discussed in detail.

## 2024-12-30 NOTE — ED PROVIDER NOTE - CARE PLAN
Assessment and plan of treatment:	Plan - labs CT meds reassess   1 Principal Discharge DX:	Pain, dental  Assessment and plan of treatment:	Plan - labs CT meds reassess

## 2024-12-30 NOTE — ED PROVIDER NOTE - OBJECTIVE STATEMENT
35 yo female, pmh of cva c/b dural av fistual, seizure disorder, migraines, p/w right dental pain and facial swelling since yesterday. mild, aching, no radiation. denies fever chills, throat pain, difficulty swallowing, neck pain.,

## 2024-12-30 NOTE — ED PROVIDER NOTE - DIFFERENTIAL DIAGNOSIS
The differential diagnosis for patients clinical presentation includes but is not limited to: dentalgia, caries, abscess Differential Diagnosis

## 2024-12-30 NOTE — ED PROVIDER NOTE - ATTENDING APP SHARED VISIT CONTRIBUTION OF CARE
34-year-old female past medical history seizure disorder, PCOS, AVF with hemorrhage status post repair, presents to the emergency department for right-sided dental pain for the past few days.  Patient reports having root canal in similar area 2 years ago.  Patient reports that 3 days ago eating tacos and felt food stuck in her tooth with worsening pain to the right lower dental area with right-sided facial swelling today.  No fevers.  No nausea, vomiting, headache.    CONSTITUTIONAL: NAD.   SKIN: warm, dry  HEAD: Normocephalic; atraumatic.  EYES: no conjunctival injection. EOMI.   ENT: MMM. tenderness to tooth 31 and near gums, no pus/drainage, tenderness to R submandibular region no overlying erythema; no brawny edema under tongue   NECK: Supple.  CARD: RRR.   EXT: Normal ROM.    NEURO: AAOX3. No FND.

## 2025-06-02 ENCOUNTER — EMERGENCY (EMERGENCY)
Facility: HOSPITAL | Age: 35
LOS: 0 days | Discharge: HOME | End: 2025-06-03
Attending: EMERGENCY MEDICINE
Payer: MEDICAID

## 2025-06-02 VITALS
HEART RATE: 101 BPM | HEIGHT: 61 IN | TEMPERATURE: 98 F | RESPIRATION RATE: 18 BRPM | DIASTOLIC BLOOD PRESSURE: 92 MMHG | SYSTOLIC BLOOD PRESSURE: 155 MMHG | OXYGEN SATURATION: 97 % | WEIGHT: 190.04 LBS

## 2025-06-02 DIAGNOSIS — R51.9 HEADACHE, UNSPECIFIED: ICD-10-CM

## 2025-06-02 DIAGNOSIS — Z86.69 PERSONAL HISTORY OF OTHER DISEASES OF THE NERVOUS SYSTEM AND SENSE ORGANS: ICD-10-CM

## 2025-06-02 DIAGNOSIS — Z98.890 OTHER SPECIFIED POSTPROCEDURAL STATES: Chronic | ICD-10-CM

## 2025-06-02 DIAGNOSIS — Z86.79 PERSONAL HISTORY OF OTHER DISEASES OF THE CIRCULATORY SYSTEM: Chronic | ICD-10-CM

## 2025-06-02 DIAGNOSIS — E28.2 POLYCYSTIC OVARIAN SYNDROME: ICD-10-CM

## 2025-06-02 PROCEDURE — 70450 CT HEAD/BRAIN W/O DYE: CPT

## 2025-06-02 PROCEDURE — 99284 EMERGENCY DEPT VISIT MOD MDM: CPT | Mod: 25

## 2025-06-02 PROCEDURE — 99284 EMERGENCY DEPT VISIT MOD MDM: CPT

## 2025-06-02 NOTE — ED ADULT TRIAGE NOTE - CHIEF COMPLAINT QUOTE
Pt c/o shooting pain to RIGHT side of head that happened a few times today; HX brain bleed 3 year ago; denies any pain/ headache at this time.

## 2025-06-03 PROCEDURE — 70450 CT HEAD/BRAIN W/O DYE: CPT | Mod: 26

## 2025-06-03 NOTE — ED PROVIDER NOTE - PATIENT PORTAL LINK FT
You can access the FollowMyHealth Patient Portal offered by [facility] by registering at the following website: [email or web address]. By joining [employer]’s FollowMyHealth portal, you will also be able to view your health information using other applications (apps) compatible with our system.

## 2025-06-03 NOTE — ED PROVIDER NOTE - CARE PROVIDER_API CALL
Shoaib Malik  Neurology  [address] [place] [address], [address]  [place] [address]  Phone: [phone]  Fax: [phone]  Follow Up Time:

## 2025-06-03 NOTE — ED PROVIDER NOTE - OBJECTIVE STATEMENT
34 years old female history of cerebral AV fistular, intracranial bleeding, migraine headache, seizure presents complaints of right sided headache off and on over the past few days.  Reports sharp headache around 2 hours ago prior to ED arrival.  Headache was brief and lasted less than a minute.  Denies headache now ED.  Further denies nausea, vomiting, light sensitivity.  Further denies fever, chills, recent illness, chest pain, abdominal pain, vomiting or diarrhea.

## 2025-06-03 NOTE — ED PROVIDER NOTE - NSFOLLOWUPINSTRUCTIONS_ED_ALL_ED_FT
Headache    A headache is pain or discomfort felt around the head or neck area. The specific cause of a headache may not be found as there are many types including tension headaches, migraine headaches, and cluster headaches. Watch your condition for any changes. Things you can do to manage your pain include taking over the counter and prescription medications as instructed by your health care provider, lying down in a dark quiet room, limiting stress, getting regular sleep, and refraining from alcohol and tobacco products.    SEEK IMMEDIATE MEDICAL CARE IF YOU EXPERIENCE THE FOLLOWING SYMPTOMS: fever, vomiting, stiff neck, loss of vision, problems with speech, muscle weakness, loss of balance, trouble walking, pass out, or confusion. Time-based billing (NON-critical care)

## 2025-06-03 NOTE — ED ADULT NURSE NOTE - NSFALLUNIVINTERV_ED_ALL_ED
Bed/Stretcher in lowest position, wheels locked, appropriate side rails in place/Call bell, personal items and telephone in reach/Instruct patient to call for assistance before getting out of bed/chair/stretcher/Non-slip footwear applied when patient is off stretcher/[place] to call system/Physically safe environment - no spills, clutter or unnecessary equipment/Purposeful proactive rounding/Room/bathroom lighting operational, light cord in reach

## 2025-06-03 NOTE — ED PROVIDER NOTE - CLINICAL SUMMARY MEDICAL DECISION MAKING FREE TEXT BOX
34yF PMhx of ophthalmic migraines, pre-eclampsia, PCOS, right occipital ICH 2/2 AVF with subsuquent   embolization of pial AV fistula 2022 at outside hospital.  Patient is well known to  Dr. Malik,  on Depakote 750mg BID and Ubrelvy 50mg as rescue.  pw her usual headaches right sided  over ast few  days - pt  has not taken her depakote or ubrelvy, here today  as  2 hours ago she had a sharp right sided pain that was more intense, lasted for a few seconds and resolved,  pt no longer in pain . was concerned she had a ich again.   Alert and oriented.  CN 2-12 intact.  Motor strength and sensory response is symmetric.  CB intact. normal gait  CT head  no acuet pathology    Patient to be discharged from ED in well appearing condition. Any available test results were discussed with and printed  for patient.  Verbal instructions given, including instructions to return to ED immediately for any new, worsening, or concerning symptoms. Limitations of ED work up discussed.  Patient reports understanding of above with capacity and insight. Written discharge instructions additionally given, including follow-up plan.

## 2025-06-03 NOTE — ED PROVIDER NOTE - NSICDXPASTMEDICALHX_GEN_ALL_CORE_FT
PAST MEDICAL HISTORY:  Bicornuate uterus     Cerebrovascular dural AV fistula     Migraine headache     Obesity     PCOS (polycystic ovarian syndrome)     Seizures     TIA (transient ischemic attack) [facility]-4/2022-inf lat visual field loss

## 2025-07-07 ENCOUNTER — APPOINTMENT (OUTPATIENT)
Dept: OBGYN | Facility: CLINIC | Age: 35
End: 2025-07-07

## 2025-07-24 ENCOUNTER — RX RENEWAL (OUTPATIENT)
Age: 35
End: 2025-07-24

## 2025-07-28 ENCOUNTER — APPOINTMENT (OUTPATIENT)
Dept: OBGYN | Facility: CLINIC | Age: 35
End: 2025-07-28
Payer: MEDICAID

## 2025-07-28 VITALS
HEART RATE: 89 BPM | BODY MASS INDEX: 36.8 KG/M2 | HEIGHT: 62 IN | WEIGHT: 200 LBS | SYSTOLIC BLOOD PRESSURE: 131 MMHG | DIASTOLIC BLOOD PRESSURE: 83 MMHG

## 2025-07-28 DIAGNOSIS — N92.6 IRREGULAR MENSTRUATION, UNSPECIFIED: ICD-10-CM

## 2025-07-28 DIAGNOSIS — Z01.419 ENCOUNTER FOR GYNECOLOGICAL EXAMINATION (GENERAL) (ROUTINE) W/OUT ABNORMAL FINDINGS: ICD-10-CM

## 2025-07-28 PROCEDURE — 99395 PREV VISIT EST AGE 18-39: CPT

## 2025-08-05 LAB — HPV HIGH+LOW RISK DNA PNL CVX: NOT DETECTED

## 2025-08-08 ENCOUNTER — EMERGENCY (EMERGENCY)
Facility: HOSPITAL | Age: 35
LOS: 0 days | Discharge: HOME | End: 2025-08-09
Attending: STUDENT IN AN ORGANIZED HEALTH CARE EDUCATION/TRAINING PROGRAM
Payer: MEDICAID

## 2025-08-08 VITALS
HEART RATE: 106 BPM | WEIGHT: 190.04 LBS | TEMPERATURE: 100 F | DIASTOLIC BLOOD PRESSURE: 74 MMHG | SYSTOLIC BLOOD PRESSURE: 143 MMHG | RESPIRATION RATE: 19 BRPM | HEIGHT: 61 IN | OXYGEN SATURATION: 99 %

## 2025-08-08 DIAGNOSIS — Z86.79 PERSONAL HISTORY OF OTHER DISEASES OF THE CIRCULATORY SYSTEM: Chronic | ICD-10-CM

## 2025-08-08 DIAGNOSIS — Z98.890 OTHER SPECIFIED POSTPROCEDURAL STATES: Chronic | ICD-10-CM

## 2025-08-08 LAB
ALBUMIN SERPL ELPH-MCNC: 3.9 G/DL — SIGNIFICANT CHANGE UP (ref 3.5–5.2)
ALP SERPL-CCNC: 122 U/L — HIGH (ref 30–115)
ALT FLD-CCNC: 20 U/L — SIGNIFICANT CHANGE UP (ref 0–41)
ANION GAP SERPL CALC-SCNC: 13 MMOL/L — SIGNIFICANT CHANGE UP (ref 7–14)
APTT BLD: 33.5 SEC — SIGNIFICANT CHANGE UP (ref 27–39.2)
AST SERPL-CCNC: 16 U/L — SIGNIFICANT CHANGE UP (ref 0–41)
BASE EXCESS BLDV CALC-SCNC: 4 MMOL/L — HIGH (ref -2–3)
BASOPHILS # BLD AUTO: 0.07 K/UL — SIGNIFICANT CHANGE UP (ref 0–0.2)
BASOPHILS NFR BLD AUTO: 0.6 % — SIGNIFICANT CHANGE UP (ref 0–2)
BILIRUB SERPL-MCNC: 0.3 MG/DL — SIGNIFICANT CHANGE UP (ref 0.2–1.2)
BUN SERPL-MCNC: 9 MG/DL — LOW (ref 10–20)
CA-I SERPL-SCNC: 1.15 MMOL/L — SIGNIFICANT CHANGE UP (ref 1.15–1.33)
CALCIUM SERPL-MCNC: 9.8 MG/DL — SIGNIFICANT CHANGE UP (ref 8.4–10.5)
CHLORIDE SERPL-SCNC: 95 MMOL/L — LOW (ref 98–110)
CO2 SERPL-SCNC: 26 MMOL/L — SIGNIFICANT CHANGE UP (ref 17–32)
CREAT SERPL-MCNC: 0.8 MG/DL — SIGNIFICANT CHANGE UP (ref 0.7–1.5)
EGFR: 98 ML/MIN/1.73M2 — SIGNIFICANT CHANGE UP
EGFR: 98 ML/MIN/1.73M2 — SIGNIFICANT CHANGE UP
EOSINOPHIL # BLD AUTO: 0.05 K/UL — SIGNIFICANT CHANGE UP (ref 0–0.5)
EOSINOPHIL NFR BLD AUTO: 0.4 % — SIGNIFICANT CHANGE UP (ref 0–6)
GAS PNL BLDV: 133 MMOL/L — LOW (ref 136–145)
GAS PNL BLDV: SIGNIFICANT CHANGE UP
GLUCOSE SERPL-MCNC: 104 MG/DL — HIGH (ref 70–99)
HCG SERPL QL: NEGATIVE — SIGNIFICANT CHANGE UP
HCO3 BLDV-SCNC: 29 MMOL/L — SIGNIFICANT CHANGE UP (ref 22–29)
HCT VFR BLD CALC: 38.1 % — SIGNIFICANT CHANGE UP (ref 34.5–45)
HCT VFR BLDA CALC: 42 % — SIGNIFICANT CHANGE UP (ref 34.5–46.5)
HGB BLD CALC-MCNC: 13.9 G/DL — SIGNIFICANT CHANGE UP (ref 11.7–16.1)
HGB BLD-MCNC: 12.9 G/DL — SIGNIFICANT CHANGE UP (ref 11.5–15.5)
IMM GRANULOCYTES # BLD AUTO: 0.07 K/UL — SIGNIFICANT CHANGE UP (ref 0–0.07)
IMM GRANULOCYTES NFR BLD AUTO: 0.6 % — SIGNIFICANT CHANGE UP (ref 0–0.9)
INR BLD: 1.17 RATIO — SIGNIFICANT CHANGE UP (ref 0.65–1.3)
LACTATE BLDV-MCNC: 1.6 MMOL/L — SIGNIFICANT CHANGE UP (ref 0.5–2)
LACTATE SERPL-SCNC: 1.6 MMOL/L — SIGNIFICANT CHANGE UP (ref 0.7–2)
LYMPHOCYTES # BLD AUTO: 1.86 K/UL — SIGNIFICANT CHANGE UP (ref 1–3.3)
LYMPHOCYTES NFR BLD AUTO: 14.9 % — SIGNIFICANT CHANGE UP (ref 13–44)
MCHC RBC-ENTMCNC: 27.6 PG — SIGNIFICANT CHANGE UP (ref 27–34)
MCHC RBC-ENTMCNC: 33.9 G/DL — SIGNIFICANT CHANGE UP (ref 32–36)
MCV RBC AUTO: 81.6 FL — SIGNIFICANT CHANGE UP (ref 80–100)
MONOCYTES # BLD AUTO: 1.12 K/UL — HIGH (ref 0–0.9)
MONOCYTES NFR BLD AUTO: 9 % — SIGNIFICANT CHANGE UP (ref 2–14)
NEUTROPHILS # BLD AUTO: 9.31 K/UL — HIGH (ref 1.8–7.4)
NEUTROPHILS NFR BLD AUTO: 74.5 % — SIGNIFICANT CHANGE UP (ref 43–77)
NRBC # BLD AUTO: 0 K/UL — SIGNIFICANT CHANGE UP (ref 0–0)
NRBC # FLD: 0 K/UL — SIGNIFICANT CHANGE UP (ref 0–0)
NRBC BLD AUTO-RTO: 0 /100 WBCS — SIGNIFICANT CHANGE UP (ref 0–0)
PCO2 BLDV: 45 MMHG — HIGH (ref 39–42)
PH BLDV: 7.42 — SIGNIFICANT CHANGE UP (ref 7.32–7.43)
PLATELET # BLD AUTO: 469 K/UL — HIGH (ref 150–400)
PMV BLD: 8.9 FL — SIGNIFICANT CHANGE UP (ref 7–13)
PO2 BLDV: 37 MMHG — SIGNIFICANT CHANGE UP (ref 25–45)
POTASSIUM BLDV-SCNC: 4.3 MMOL/L — SIGNIFICANT CHANGE UP (ref 3.5–5.1)
POTASSIUM SERPL-MCNC: 4.4 MMOL/L — SIGNIFICANT CHANGE UP (ref 3.5–5)
POTASSIUM SERPL-SCNC: 4.4 MMOL/L — SIGNIFICANT CHANGE UP (ref 3.5–5)
PROT SERPL-MCNC: 7.2 G/DL — SIGNIFICANT CHANGE UP (ref 6–8)
PROTHROM AB SERPL-ACNC: 13.8 SEC — HIGH (ref 9.95–12.87)
RBC # BLD: 4.67 M/UL — SIGNIFICANT CHANGE UP (ref 3.8–5.2)
RBC # FLD: 13.1 % — SIGNIFICANT CHANGE UP (ref 10.3–14.5)
SAO2 % BLDV: 62.7 % — LOW (ref 67–88)
SODIUM SERPL-SCNC: 134 MMOL/L — LOW (ref 135–146)
WBC # BLD: 12.48 K/UL — HIGH (ref 3.8–10.5)
WBC # FLD AUTO: 12.48 K/UL — HIGH (ref 3.8–10.5)

## 2025-08-08 PROCEDURE — 82330 ASSAY OF CALCIUM: CPT

## 2025-08-08 PROCEDURE — 84132 ASSAY OF SERUM POTASSIUM: CPT

## 2025-08-08 PROCEDURE — 70498 CT ANGIOGRAPHY NECK: CPT

## 2025-08-08 PROCEDURE — 71046 X-RAY EXAM CHEST 2 VIEWS: CPT

## 2025-08-08 PROCEDURE — 70450 CT HEAD/BRAIN W/O DYE: CPT

## 2025-08-08 PROCEDURE — 70450 CT HEAD/BRAIN W/O DYE: CPT | Mod: 26,59

## 2025-08-08 PROCEDURE — 85610 PROTHROMBIN TIME: CPT

## 2025-08-08 PROCEDURE — 85025 COMPLETE CBC W/AUTO DIFF WBC: CPT

## 2025-08-08 PROCEDURE — 93005 ELECTROCARDIOGRAM TRACING: CPT

## 2025-08-08 PROCEDURE — 70498 CT ANGIOGRAPHY NECK: CPT | Mod: 26

## 2025-08-08 PROCEDURE — 84295 ASSAY OF SERUM SODIUM: CPT

## 2025-08-08 PROCEDURE — 85018 HEMOGLOBIN: CPT

## 2025-08-08 PROCEDURE — 84703 CHORIONIC GONADOTROPIN ASSAY: CPT

## 2025-08-08 PROCEDURE — 85014 HEMATOCRIT: CPT

## 2025-08-08 PROCEDURE — 96375 TX/PRO/DX INJ NEW DRUG ADDON: CPT | Mod: XU

## 2025-08-08 PROCEDURE — 82803 BLOOD GASES ANY COMBINATION: CPT

## 2025-08-08 PROCEDURE — 93010 ELECTROCARDIOGRAM REPORT: CPT

## 2025-08-08 PROCEDURE — 70496 CT ANGIOGRAPHY HEAD: CPT

## 2025-08-08 PROCEDURE — 83605 ASSAY OF LACTIC ACID: CPT

## 2025-08-08 PROCEDURE — 99285 EMERGENCY DEPT VISIT HI MDM: CPT

## 2025-08-08 PROCEDURE — 87040 BLOOD CULTURE FOR BACTERIA: CPT

## 2025-08-08 PROCEDURE — 99285 EMERGENCY DEPT VISIT HI MDM: CPT | Mod: 25

## 2025-08-08 PROCEDURE — 70496 CT ANGIOGRAPHY HEAD: CPT | Mod: 26

## 2025-08-08 PROCEDURE — 81001 URINALYSIS AUTO W/SCOPE: CPT

## 2025-08-08 PROCEDURE — 36415 COLL VENOUS BLD VENIPUNCTURE: CPT

## 2025-08-08 PROCEDURE — 96374 THER/PROPH/DIAG INJ IV PUSH: CPT | Mod: XU

## 2025-08-08 PROCEDURE — 80053 COMPREHEN METABOLIC PANEL: CPT

## 2025-08-08 PROCEDURE — 85730 THROMBOPLASTIN TIME PARTIAL: CPT

## 2025-08-08 RX ORDER — LIDOCAINE HCL/PF 10 MG/ML
130 VIAL (ML) INJECTION ONCE
Refills: 0 | Status: COMPLETED | OUTPATIENT
Start: 2025-08-08 | End: 2025-08-08

## 2025-08-08 RX ORDER — METOCLOPRAMIDE HCL 10 MG
10 TABLET ORAL ONCE
Refills: 0 | Status: COMPLETED | OUTPATIENT
Start: 2025-08-08 | End: 2025-08-08

## 2025-08-08 RX ORDER — MAGNESIUM SULFATE 500 MG/ML
2 SYRINGE (ML) INJECTION ONCE
Refills: 0 | Status: COMPLETED | OUTPATIENT
Start: 2025-08-08 | End: 2025-08-08

## 2025-08-08 RX ADMIN — Medication 150 GRAM(S): at 23:15

## 2025-08-08 RX ADMIN — Medication 10 MILLIGRAM(S): at 22:10

## 2025-08-08 RX ADMIN — Medication 1500 MILLILITER(S): at 22:10

## 2025-08-09 VITALS
HEART RATE: 106 BPM | TEMPERATURE: 101 F | SYSTOLIC BLOOD PRESSURE: 138 MMHG | RESPIRATION RATE: 19 BRPM | OXYGEN SATURATION: 99 % | DIASTOLIC BLOOD PRESSURE: 72 MMHG

## 2025-08-09 LAB
APPEARANCE UR: CLEAR — SIGNIFICANT CHANGE UP
BACTERIA # UR AUTO: ABNORMAL /HPF
BILIRUB UR-MCNC: NEGATIVE — SIGNIFICANT CHANGE UP
COLOR SPEC: YELLOW — SIGNIFICANT CHANGE UP
DIFF PNL FLD: NEGATIVE — SIGNIFICANT CHANGE UP
EPI CELLS # UR: PRESENT
GLUCOSE UR QL: NEGATIVE MG/DL — SIGNIFICANT CHANGE UP
KETONES UR QL: ABNORMAL MG/DL
LEUKOCYTE ESTERASE UR-ACNC: NEGATIVE — SIGNIFICANT CHANGE UP
NITRITE UR-MCNC: NEGATIVE — SIGNIFICANT CHANGE UP
PH UR: 7 — SIGNIFICANT CHANGE UP (ref 5–8)
PROT UR-MCNC: 30 MG/DL
RBC CASTS # UR COMP ASSIST: 0 /HPF — SIGNIFICANT CHANGE UP (ref 0–4)
SP GR SPEC: >1.03 — HIGH (ref 1–1.03)
SQUAMOUS # UR AUTO: 20 /HPF — HIGH (ref 0–5)
UROBILINOGEN FLD QL: 0.2 MG/DL — SIGNIFICANT CHANGE UP (ref 0.2–1)
WBC UR QL: 1 /HPF — SIGNIFICANT CHANGE UP (ref 0–5)

## 2025-08-09 PROCEDURE — 71046 X-RAY EXAM CHEST 2 VIEWS: CPT | Mod: 26

## 2025-08-09 RX ORDER — ACETAMINOPHEN 500 MG/5ML
975 LIQUID (ML) ORAL ONCE
Refills: 0 | Status: COMPLETED | OUTPATIENT
Start: 2025-08-09 | End: 2025-08-09

## 2025-08-09 RX ORDER — LIDOCAINE HCL/PF 10 MG/ML
130 VIAL (ML) INJECTION ONCE
Refills: 0 | Status: DISCONTINUED | OUTPATIENT
Start: 2025-08-09 | End: 2025-08-09

## 2025-08-09 RX ORDER — PROCHLORPERAZINE 25 MG
10 SUPPOSITORY, RECTAL RECTAL ONCE
Refills: 0 | Status: COMPLETED | OUTPATIENT
Start: 2025-08-09 | End: 2025-08-09

## 2025-08-09 RX ADMIN — Medication 2 MILLIGRAM(S): at 03:11

## 2025-08-09 RX ADMIN — Medication 639 MILLIGRAM(S): at 01:22

## 2025-08-09 RX ADMIN — Medication 975 MILLIGRAM(S): at 03:10

## 2025-08-14 LAB
CULTURE RESULTS: SIGNIFICANT CHANGE UP
CULTURE RESULTS: SIGNIFICANT CHANGE UP
CYTOLOGY CVX/VAG DOC THIN PREP: NORMAL
SPECIMEN SOURCE: SIGNIFICANT CHANGE UP
SPECIMEN SOURCE: SIGNIFICANT CHANGE UP

## 2025-08-18 ENCOUNTER — RX RENEWAL (OUTPATIENT)
Age: 35
End: 2025-08-18

## 2025-08-23 RX ORDER — BUTALBITAL, ACETAMINOPHEN AND CAFFEINE 325; 50; 40 MG/1; MG/1; MG/1
50-325-40 TABLET ORAL EVERY 8 HOURS
Qty: 90 | Refills: 0 | Status: ACTIVE | COMMUNITY
Start: 2025-08-23 | End: 2025-09-22

## 2025-08-26 ENCOUNTER — APPOINTMENT (OUTPATIENT)
Dept: NEUROLOGY | Facility: CLINIC | Age: 35
End: 2025-08-26
Payer: MEDICAID

## 2025-08-26 VITALS
WEIGHT: 211 LBS | HEIGHT: 62 IN | SYSTOLIC BLOOD PRESSURE: 118 MMHG | BODY MASS INDEX: 38.83 KG/M2 | HEART RATE: 81 BPM | DIASTOLIC BLOOD PRESSURE: 78 MMHG

## 2025-08-26 PROCEDURE — 99215 OFFICE O/P EST HI 40 MIN: CPT

## 2025-08-26 RX ORDER — METHYLPREDNISOLONE 4 MG/1
4 TABLET ORAL
Qty: 1 | Refills: 0 | Status: ACTIVE | COMMUNITY
Start: 2025-08-26 | End: 1900-01-01

## 2025-08-26 RX ORDER — METHOCARBAMOL 500 MG/1
500 TABLET, FILM COATED ORAL
Qty: 30 | Refills: 2 | Status: ACTIVE | COMMUNITY
Start: 2025-08-26 | End: 1900-01-01

## 2025-08-27 ENCOUNTER — APPOINTMENT (OUTPATIENT)
Dept: NEUROLOGY | Facility: CLINIC | Age: 35
End: 2025-08-27

## 2025-08-27 RX ORDER — GALCANEZUMAB 100 MG/ML
100 INJECTION, SOLUTION SUBCUTANEOUS
Qty: 3 | Refills: 0 | Status: ACTIVE | COMMUNITY
Start: 2025-08-26

## 2025-08-27 RX ORDER — GALCANEZUMAB 120 MG/ML
120 INJECTION, SOLUTION SUBCUTANEOUS
Qty: 3 | Refills: 1 | Status: ACTIVE | COMMUNITY
Start: 2025-08-26

## 2025-08-27 RX ORDER — RIMEGEPANT SULFATE 75 MG/75MG
75 TABLET, ORALLY DISINTEGRATING ORAL
Qty: 16 | Refills: 3 | Status: ACTIVE | COMMUNITY
Start: 2025-08-26 | End: 1900-01-01

## 2025-08-28 ENCOUNTER — RX CHANGE (OUTPATIENT)
Age: 35
End: 2025-08-28

## 2025-09-04 ENCOUNTER — NON-APPOINTMENT (OUTPATIENT)
Age: 35
End: 2025-09-04

## 2025-09-09 ENCOUNTER — EMERGENCY (EMERGENCY)
Facility: HOSPITAL | Age: 35
LOS: 0 days | Discharge: HOME | End: 2025-09-09
Attending: EMERGENCY MEDICINE
Payer: MEDICAID

## 2025-09-09 VITALS
OXYGEN SATURATION: 98 % | RESPIRATION RATE: 18 BRPM | DIASTOLIC BLOOD PRESSURE: 83 MMHG | HEART RATE: 94 BPM | SYSTOLIC BLOOD PRESSURE: 133 MMHG | HEIGHT: 61 IN | TEMPERATURE: 98 F | WEIGHT: 201.94 LBS

## 2025-09-09 DIAGNOSIS — M25.562 PAIN IN LEFT KNEE: ICD-10-CM
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
## 2025-09-09 DIAGNOSIS — M79.672 PAIN IN LEFT FOOT: ICD-10-CM

## 2025-09-09 DIAGNOSIS — S89.92XA UNSPECIFIED INJURY OF LEFT LOWER LEG, INITIAL ENCOUNTER: ICD-10-CM

## 2025-09-09 DIAGNOSIS — Z86.79 PERSONAL HISTORY OF OTHER DISEASES OF THE CIRCULATORY SYSTEM: Chronic | ICD-10-CM

## 2025-09-09 DIAGNOSIS — Z98.890 OTHER SPECIFIED POSTPROCEDURAL STATES: Chronic | ICD-10-CM

## 2025-09-09 PROCEDURE — 73600 X-RAY EXAM OF ANKLE: CPT | Mod: 26,LT

## 2025-09-09 PROCEDURE — 73630 X-RAY EXAM OF FOOT: CPT | Mod: LT

## 2025-09-09 PROCEDURE — 73562 X-RAY EXAM OF KNEE 3: CPT | Mod: 26,LT

## 2025-09-09 PROCEDURE — 73562 X-RAY EXAM OF KNEE 3: CPT | Mod: LT

## 2025-09-09 PROCEDURE — 73630 X-RAY EXAM OF FOOT: CPT | Mod: 26,LT

## 2025-09-09 PROCEDURE — 99284 EMERGENCY DEPT VISIT MOD MDM: CPT | Mod: 25

## 2025-09-09 PROCEDURE — 73590 X-RAY EXAM OF LOWER LEG: CPT | Mod: 26,LT

## 2025-09-09 PROCEDURE — 73600 X-RAY EXAM OF ANKLE: CPT | Mod: LT

## 2025-09-09 PROCEDURE — 73590 X-RAY EXAM OF LOWER LEG: CPT | Mod: LT

## 2025-09-09 PROCEDURE — 99284 EMERGENCY DEPT VISIT MOD MDM: CPT

## 2025-09-09 RX ORDER — ACETAMINOPHEN 500 MG/5ML
650 LIQUID (ML) ORAL ONCE
Refills: 0 | Status: DISCONTINUED | OUTPATIENT
Start: 2025-09-09 | End: 2025-09-09

## 2025-09-09 RX ORDER — DIAZEPAM 5 MG/1
2 TABLET ORAL ONCE
Refills: 0 | Status: DISCONTINUED | OUTPATIENT
Start: 2025-09-09 | End: 2025-09-09

## 2025-09-12 ENCOUNTER — APPOINTMENT (OUTPATIENT)
Dept: NEUROLOGY | Facility: CLINIC | Age: 35
End: 2025-09-12

## 2025-09-15 ENCOUNTER — APPOINTMENT (OUTPATIENT)
Dept: NEUROLOGY | Facility: CLINIC | Age: 35
End: 2025-09-15

## 2025-09-19 ENCOUNTER — RESULT REVIEW (OUTPATIENT)
Age: 35
End: 2025-09-19